# Patient Record
Sex: FEMALE | Race: WHITE | NOT HISPANIC OR LATINO | Employment: FULL TIME | URBAN - METROPOLITAN AREA
[De-identification: names, ages, dates, MRNs, and addresses within clinical notes are randomized per-mention and may not be internally consistent; named-entity substitution may affect disease eponyms.]

---

## 2023-03-23 LAB — THYROTROPIN (MIU/L) IN SER/PLAS BY DETECTION LIMIT <= 0.05 MIU/L: 0.58 MIU/L (ref 0.44–3.98)

## 2023-04-26 ENCOUNTER — HOSPITAL ENCOUNTER (OUTPATIENT)
Dept: DATA CONVERSION | Facility: HOSPITAL | Age: 28
End: 2023-04-26
Attending: SURGERY | Admitting: SURGERY
Payer: COMMERCIAL

## 2023-04-26 DIAGNOSIS — L72.0 EPIDERMAL CYST: ICD-10-CM

## 2023-04-26 DIAGNOSIS — N87.9 DYSPLASIA OF CERVIX UTERI, UNSPECIFIED: ICD-10-CM

## 2023-04-26 DIAGNOSIS — R20.8 OTHER DISTURBANCES OF SKIN SENSATION: ICD-10-CM

## 2023-04-26 DIAGNOSIS — H54.7 UNSPECIFIED VISUAL LOSS: ICD-10-CM

## 2023-04-26 DIAGNOSIS — Z98.51 TUBAL LIGATION STATUS: ICD-10-CM

## 2023-04-26 DIAGNOSIS — E28.2 POLYCYSTIC OVARIAN SYNDROME: ICD-10-CM

## 2023-04-26 DIAGNOSIS — D64.9 ANEMIA, UNSPECIFIED: ICD-10-CM

## 2023-04-26 DIAGNOSIS — E05.90 THYROTOXICOSIS, UNSPECIFIED WITHOUT THYROTOXIC CRISIS OR STORM: ICD-10-CM

## 2023-04-26 DIAGNOSIS — Z90.49 ACQUIRED ABSENCE OF OTHER SPECIFIED PARTS OF DIGESTIVE TRACT: ICD-10-CM

## 2023-04-26 DIAGNOSIS — E66.9 OBESITY, UNSPECIFIED: ICD-10-CM

## 2023-04-26 DIAGNOSIS — D21.4 BENIGN NEOPLASM OF CONNECTIVE AND OTHER SOFT TISSUE OF ABDOMEN: ICD-10-CM

## 2023-04-26 DIAGNOSIS — Z86.16 PERSONAL HISTORY OF COVID-19: ICD-10-CM

## 2023-05-18 LAB
COMPLETE PATHOLOGY REPORT: NORMAL
CONVERTED CLINICAL DIAGNOSIS-HISTORY: NORMAL
CONVERTED FINAL DIAGNOSIS: NORMAL
CONVERTED FINAL REPORT PDF LINK TO COPY AND PASTE: NORMAL
CONVERTED GROSS DESCRIPTION: NORMAL

## 2023-09-02 PROBLEM — Z72.821 INADEQUATE SLEEP HYGIENE: Status: ACTIVE | Noted: 2023-09-02

## 2023-09-02 PROBLEM — R79.89 LOW TSH LEVEL: Status: ACTIVE | Noted: 2023-09-02

## 2023-09-02 PROBLEM — F51.09 OTHER INSOMNIA NOT DUE TO A SUBSTANCE OR KNOWN PHYSIOLOGICAL CONDITION: Status: ACTIVE | Noted: 2023-09-02

## 2023-09-02 PROBLEM — B96.89 BACTERIAL VAGINOSIS: Status: ACTIVE | Noted: 2023-09-02

## 2023-09-02 PROBLEM — T14.8XXA WOUND INFECTION: Status: ACTIVE | Noted: 2023-09-02

## 2023-09-02 PROBLEM — S93.491A SPRAIN OF ANTERIOR TALOFIBULAR LIGAMENT OF RIGHT ANKLE: Status: ACTIVE | Noted: 2023-09-02

## 2023-09-02 PROBLEM — M76.821 POSTERIOR TIBIAL TENDINITIS OF RIGHT LOWER EXTREMITY: Status: ACTIVE | Noted: 2023-09-02

## 2023-09-02 PROBLEM — L02.226 FURUNCLE OF UMBILICUS: Status: ACTIVE | Noted: 2023-09-02

## 2023-09-02 PROBLEM — E66.01 MORBID OBESITY (MULTI): Status: ACTIVE | Noted: 2022-04-18

## 2023-09-02 PROBLEM — E66.813 CLASS 3 SEVERE OBESITY DUE TO EXCESS CALORIES WITH BODY MASS INDEX (BMI) OF 45.0 TO 49.9 IN ADULT: Status: ACTIVE | Noted: 2023-09-02

## 2023-09-02 PROBLEM — G25.81 RESTLESS LEG SYNDROME: Status: ACTIVE | Noted: 2023-09-02

## 2023-09-02 PROBLEM — E05.90 HYPERTHYROIDISM: Status: ACTIVE | Noted: 2023-09-02

## 2023-09-02 PROBLEM — E66.01 CLASS 3 SEVERE OBESITY DUE TO EXCESS CALORIES WITH BODY MASS INDEX (BMI) OF 45.0 TO 49.9 IN ADULT (MULTI): Status: ACTIVE | Noted: 2023-09-02

## 2023-09-02 PROBLEM — J30.2 SEASONAL ALLERGIES: Status: ACTIVE | Noted: 2023-09-02

## 2023-09-02 PROBLEM — L08.9 WOUND INFECTION: Status: ACTIVE | Noted: 2023-09-02

## 2023-09-02 PROBLEM — G47.10 HYPERSOMNIA WITH SLEEP APNEA: Status: ACTIVE | Noted: 2023-09-02

## 2023-09-02 PROBLEM — G47.30 HYPERSOMNIA WITH SLEEP APNEA: Status: ACTIVE | Noted: 2023-09-02

## 2023-09-02 PROBLEM — N76.0 BACTERIAL VAGINOSIS: Status: ACTIVE | Noted: 2023-09-02

## 2023-09-02 PROBLEM — S86.301A INJURY OF PERONEAL TENDON OF RIGHT FOOT: Status: ACTIVE | Noted: 2023-09-02

## 2023-09-02 PROBLEM — R29.898 ANKLE WEAKNESS: Status: ACTIVE | Noted: 2023-09-02

## 2023-09-02 PROBLEM — B08.4 HAND, FOOT AND MOUTH DISEASE: Status: ACTIVE | Noted: 2023-09-02

## 2023-09-02 PROBLEM — Z98.84 BARIATRIC SURGERY STATUS: Status: ACTIVE | Noted: 2023-09-02

## 2023-09-02 PROBLEM — Z14.1 CYSTIC FIBROSIS CARRIER: Status: ACTIVE | Noted: 2023-09-02

## 2023-09-02 PROBLEM — R26.9 GAIT ABNORMALITY: Status: ACTIVE | Noted: 2023-09-02

## 2023-09-02 PROBLEM — R63.5 WEIGHT GAIN: Status: ACTIVE | Noted: 2023-09-02

## 2023-09-02 RX ORDER — FERROUS SULFATE 325(65) MG
65 TABLET ORAL DAILY
COMMUNITY
Start: 2014-08-08 | End: 2023-10-11 | Stop reason: ALTCHOICE

## 2023-09-02 RX ORDER — ONDANSETRON 4 MG/1
4 TABLET, ORALLY DISINTEGRATING ORAL EVERY 6 HOURS PRN
COMMUNITY
Start: 2023-03-24 | End: 2023-10-11 | Stop reason: ALTCHOICE

## 2023-09-02 RX ORDER — CHOLECALCIFEROL (VITAMIN D3) 50 MCG
2000 TABLET ORAL DAILY
COMMUNITY
Start: 2014-08-08

## 2023-09-02 RX ORDER — TRAMADOL HYDROCHLORIDE 50 MG/1
50 TABLET ORAL 4 TIMES DAILY PRN
COMMUNITY
Start: 2022-09-20 | End: 2023-10-11 | Stop reason: ALTCHOICE

## 2023-09-02 RX ORDER — LIDOCAINE HYDROCHLORIDE 20 MG/ML
SOLUTION ORAL; TOPICAL
COMMUNITY
Start: 2022-09-08 | End: 2023-10-11 | Stop reason: ALTCHOICE

## 2023-09-02 RX ORDER — ACETAMINOPHEN 500 MG
1000 TABLET ORAL EVERY 6 HOURS
COMMUNITY
End: 2023-10-11 | Stop reason: ALTCHOICE

## 2023-09-02 RX ORDER — IBUPROFEN 600 MG/1
600 TABLET ORAL EVERY 6 HOURS
COMMUNITY
Start: 2022-09-20 | End: 2023-10-11 | Stop reason: ALTCHOICE

## 2023-09-07 VITALS — WEIGHT: 249.12 LBS | HEIGHT: 62 IN | BODY MASS INDEX: 45.84 KG/M2

## 2023-09-14 NOTE — H&P
History & Physical Reviewed:   Pregnant/Lactating:  ·  Are You Pregnant no   ·  Are You Currently Breastfeeding no     I have reviewed the History and Physical dated:  03-Apr-2023   History and Physical reviewed and relevant findings noted. Patient examined to review pertinent physical  findings.: No significant changes   Home Medications Reviewed: no changes noted   Allergies Reviewed: no changes noted       ERAS (Enhanced Recovery After Surgery):  ·  ERAS Patient: no     Consent:   COVID-19 Consent:  ·  COVID-19 Risk Consent Surgeon has reviewed key risks related to the risk of janette COVID-19 and if they contract COVID-19 what the risks are.       Electronic Signatures:  Sheree Rodriguez)  (Signed 26-Apr-2023 07:59)   Authored: History & Physical Reviewed, ERAS, Consent,  Note Completion      Last Updated: 26-Apr-2023 07:59 by Sheree Rodriguez)

## 2023-10-02 NOTE — OP NOTE
PROCEDURE DETAILS    Preoperative Diagnosis:  Epidermoid cyst, L72.0  Other disturbance of skin sensation, R20.8    Postoperative Diagnosis:  ruptured  cyst of umbilicus  Surgeon: Sheree Rodriguez  Resident/Fellow/Other Assistant: Fifi Goss    Procedure:  1.  EXCISION OF 2CM CYSTIC LESION OF UMBILICUS    Anesthesia: No anesthesiologist associated with this case  Estimated Blood Loss: 2  Findings: above  Specimens(s) Collected: yes,           Operative Report:   Indication for procedure:  Pt had recent gyn surgery and developed a cystic lesion at the umbilicus with drainage and pain.  Here for excision of cyst at umbilicus  2 cm in size.    Procedure:  Pt taken to OR placed supine. MAC given area localized with 15 ml of mix of Marcaine and lidocaine.  Cyst excised with normal skin overlying  2 cm by 1 cm.  Wound irrigated.  Hemostasis achieved with electrocautery.  Skin closed with 4-0 Prolene.  Dressing applied.  Pt awakened and taken to PACU in stable condition.   Note Recipients:   Moises Davila MD - [phone] [PREFERRED]  Sheree Rodriguez MD - [phone] [Preferred]                        Attestation:   Note Completion:  Attending Attestation I performed the procedure without a resident         Electronic Signatures:  Sheree Rodriguez)  (Signed 26-Apr-2023 08:33)   Authored: Post-Operative Note, Chart Review, Note Completion      Last Updated: 26-Apr-2023 08:33 by Sheree Rodriguez)

## 2023-10-11 ENCOUNTER — TELEMEDICINE CLINICAL SUPPORT (OUTPATIENT)
Dept: SURGERY | Facility: CLINIC | Age: 28
End: 2023-10-11
Payer: COMMERCIAL

## 2023-10-11 ENCOUNTER — HOSPITAL ENCOUNTER (OUTPATIENT)
Dept: RADIOLOGY | Facility: HOSPITAL | Age: 28
Discharge: HOME | End: 2023-10-11
Payer: COMMERCIAL

## 2023-10-11 VITALS — BODY MASS INDEX: 48.03 KG/M2 | HEIGHT: 62 IN | WEIGHT: 261 LBS

## 2023-10-11 DIAGNOSIS — S93.491A SPRAIN OF OTHER LIGAMENT OF RIGHT ANKLE, INITIAL ENCOUNTER: ICD-10-CM

## 2023-10-11 PROCEDURE — 73721 MRI JNT OF LWR EXTRE W/O DYE: CPT | Mod: RT

## 2023-10-11 PROCEDURE — 73721 MRI JNT OF LWR EXTRE W/O DYE: CPT | Mod: RIGHT SIDE | Performed by: STUDENT IN AN ORGANIZED HEALTH CARE EDUCATION/TRAINING PROGRAM

## 2023-10-11 NOTE — PROGRESS NOTES
Subjective     Date: 10/11/2023 Time: 12:18 PM  Name: Erick Becerril  MRN: [id number]    This is a 28 y.o. female with morbid obesity (There is no height or weight on file to calculate BMI.) who presents to clinic for consideration of bariatric surgery. she has attempted and failed multiple diet and exercise regimens for weight loss. Initial Onset of obesity was after pregnancy.  Their goal for surgery is to  be healthier  and lose weight. The patient has tried multiple diets to lose weight including Low Carb, Exercise, Low Calorie, and 1:1 conseling . The patient was most successful with the  1:1 consulting . The most pounds lost on this diet were 20 lbs. The patient considers their dietary weakness to be sweets The patient reports a  highest weight ever of 260 (current) pounds and lowest weight ever of 120 pounds Distribution of Obesity: is central. Current diet:  watching what she eats . Compliance: General Adherence Diet Problems: {Diet Problems:92254} } Dietary Details Include:{Dietary Details:86373} The patient does not exercise Injured ankle in May- using walking boot and brace. {Excercise Duration (Optional):36814} {Types of Exercise (Optional):53484}    {Procedure Preferred:60555}    PMH: No problem-specific Assessment & Plan notes found for this encounter.      PSH:   Past Surgical History:   Procedure Laterality Date    OTHER SURGICAL HISTORY  03/18/2021    [place] tooth extraction    OTHER SURGICAL HISTORY  09/27/2022    Laparoscopic hysterectomy    OTHER SURGICAL HISTORY  06/16/2022    Cholecystectomy    OTHER SURGICAL HISTORY  10/27/2021    Bilateral salpingectomy        STOPBANG 2 (+ Tired, BMI>35).     NO personal/family hx of VTE.    FAMILY HISTORY:  Family History   Problem Relation Name Age of Onset    Skin cancer Mother      No Known Problems Father          SOCIAL HISTORY:  Social History     Tobacco Use    Smoking status: Never    Smokeless tobacco: Never   Substance Use Topics    Alcohol use: Not  Currently    Drug use: Not Currently       MEDICATIONS:  Prior to Admission Medications:  Medication Documentation Review Audit    **Prior to Admission medications have not yet been reviewed**          ALLERGIES:  No Known Allergies    REVIEW OF SYSTEMS:  GENERAL: Negative for malaise, significant weight loss and fever  HEAD: Negative for headache, swelling.  NECK: Negative for lumps, goiter, pain and significant neck swelling  RESPIRATORY: Negative for cough, wheezing or shortness of breath.  CARDIOVASCULAR: Negative for chest pain, leg swelling or palpitations.  GI: Negative for abdominal discomfort, blood in stools or black stools or change in bowel habits  : No history of dysuria, frequency or incontinence  MUSCULOSKELETAL: Negative for joint pain or swelling, back pain or muscle pain.  SKIN: Negative for lesions, rash, and itching.  PSYCH: Negative for sleep disturbance, mood disorder and recent psychosocial stressors.  ENDOCRINE: Negative for cold or heat intolerance, polyuria, polydipsia and goiter.    Objective   PHYSICAL EXAM:  Visit Vitals  LMP 07/29/2022   OB Status Hysterectomy   Smoking Status Never     General appearance: obese, NAD  Neuro: AOx3  Head: EOMI; no swelling or lesions of scalp or face  ENT:  no lumps or lymphadenopathy, thyroid normal to palpation; oropharynx clear, no swelling or erythema  Skin: warm, no erythema or rashes  Lungs: clear to percussion and auscultation  Heart: regular rhythm and S1, S2 normal  Abdomen: soft, non-tender, no masses, no organomegaly  Extremities: Normal exam of the extremities. No swelling or pain.  Psych: no hurried speech, no flight of ideas, normal affect    Assessment/Plan   IMPRESSION:  Erick Becerril is a 28 y.o. female with a BMI of There is no height or weight on file to calculate BMI. with the following diagnoses and co-morbidities:     Patient Active Problem List   Diagnosis    Bacterial vaginosis    Cystic fibrosis carrier    Hand, foot and mouth  disease    Hyperthyroidism    Low TSH level    Morbid obesity (CMS/HCC)    Wound infection    Ankle weakness    Bariatric surgery status    BMI 45.0-49.9, adult (CMS/[facility])    Class 3 severe obesity due to excess calories with body mass index (BMI) of 45.0 to 49.9 in adult (CMS/[facility])    Furuncle of umbilicus    Gait abnormality    Hypersomnia with sleep apnea    Inadequate sleep hygiene    Injury of peroneal tendon of right foot    Other insomnia not due to a substance or known physiological condition    Posterior tibial tendinitis of right lower extremity    Restless leg syndrome    Seasonal allergies    Sprain of anterior talofibular ligament of right ankle    Weight gain       This patient does meet the criteria for a surgical weight loss procedure according to NIH guidelines.  The risks of sleeve gastrectomy, Candelario-en-Y gastric bypass, and duodenal switch surgery including but not limited to bleeding, leak along staple lines, infection, dehydration, ulcers, internal hernia, DVT/PE, pneumonia, myocardial infarction, prolonged nausea/vomiting, incomplete resolution of associated medical conditions, reflux, weight regain, vitamin/mineral deficiencies, and death have been explained to the patient and Erick Becerril has expressed understanding and acceptance of them.     We discussed the lifestyle changes necessary to be successful following surgery.    The increased risk of substance and alcohol abuse following bariatric surgery was discussed with the patient, along with the negative consequences of substance/alcohol use after surgery including addiction, worsening of mental health disorders, and injury to the stomach. The risk of smoking and vaping (tobacco or any other substance) after bariatric surgery was explained to the patient. This includes risk of anastamotic ulcers, gastritis, bleeding, perforation, stricture, and PO intolerance.  The patient expressed understanding and acceptance of these risks.    ***The  patient was advised not to become pregnant within 12-18 months following bariatric surgery. She was educated on the increased risks to mother and fetus associated with pregnancy within 2 years of bariatric surgery.    The benefits of the above surgeries including weight loss, improvement/resolution of associated medical and mental health conditions, improved mobility, and decreased mortality have been explained the the patient and Erick Becerril has expressed understanding and acceptance of them.      PLAN:  The plan of treatment for Erick Becerril is to continue with the consultations and tests ordered today in hopes of qualifying for pre-operative clearance for bariatric surgery. This includes:    Consult Nutrition for education and *** months of MSWL  Consult Psychology  Consult Cardiology  Consult Pulmonology  Labs ordered  EGD  PCP for medical optimization  Consult sleep medicine - concern for LUCY  Recommend at least *** lbs of weight loss prior to surgery.      *** minutes were spent with patient including history, physical exam, and education.

## 2023-10-11 NOTE — PROGRESS NOTES
"Initial Bariatric Nutrition Assessment    Surgeon:  Viky  Patient is considering: sleeve gastrectomy    ASSESSMENT:  Current weight:   Vitals:    10/11/23 1439   Weight: 118 kg (261 lb)     Ht:  1.575 m (5' 2\")   BMI:  Body mass index is 47.74 kg/m².        Initial start weight:   261.0 lbs.  Pre-Op Excess Body Weight (EBW):  125.0  Target Post-Op weight goal:  179.7- 198.5      This is a 28 y.o. female with morbid obesity (Body mass index is 47.74 kg/m².) who presents to clinic for consideration of bariatric surgery. The patient has attempted and failed multiple diet and exercise regimens for weight loss.   Initial Onset of obesity was about 10 years ago after having her first child.  Goal for surgery is to lower weight for overall health.      Food allergies/intolerances:   no  Chewing/Swallowing/Dentition: no  Nausea / Vomiting / Hx Gastroparesis:  no  Diarrhea/ Constipation: no  Smoking/Tobacco use: no  Vitamins/Minerals supplements: MVI  Hours of sleep/night: 6-8  Exercise: light walking; has ankle brace due to a fall in May  Previous Weight loss Attempts: low carb, Krys's, dietitian monitored    Medications:     Current Outpatient Medications:     cholecalciferol (Vitamin D3) 50 MCG (2000 UT) tablet, Take by mouth., Disp: , Rfl:     24 HOUR RECALL/DIET HISTORY:  Breakfast:  bagel with butter  Snack:  no  Lunch: air fried chicken and steamed vegetables  Snack:  no  Dinner: grilled chicken salad with FF dressing  Snack: scoop sherbert  Beverages: water, coffee with stevia and skim milk, regular pop  Alcohol: no    Person responsible for cooking & shopping?  fiancee and self  How often do you eat sweet snacks?   3x/wk  How often do you eat savory snacks?  once weekly  How often do you eat out?   once weekly  Do you feel overly stuffed?   sometimes  Binge Eating?  no  Night Eating? no   Emotional Eating?  Boredom eating     READINESS TO LEARN:  Motivation to learn: Interested        Understanding of instruction: " Good  Anticipated Compliance: Good  Family Support:   greg    Educational Materials Provided:    Pre-op Diet and sample menus                                             Nutrition Guidelines for Gastric Bypass and Sleeve Gastrectomy   Schedules for MSWL class and support group   1200 Calorie meal plan   Goals sheet    Nutrition assessment completed today.  Pt will be scheduled for video education class to discuss the 2 week pre op diet, post op protein and fluid goals, vitamin and mineral supplementation, exercise goals, and post op diet progression closer to the time of surgery.    Instructed pt on a 1200 calorie meal plan and to measure and record intake daily. Advised to eat 3 meals per and 1-2 high protein snacks.  Recommend eating 15-30 grams protein per meal. Reviewed the postop behaviors to start practicing.  Set a goal to start doing regular exercise.    Patient was receptive to nutritional recommendations, asked numerous questions, and verbalized understanding of the weight loss surgery diet.  Patient expressed understanding about the importance of strict dietary compliance post-surgery to avoid nutritional deficiencies and achieve optimal weight loss and verbalized intent to follow dietary recommendations.    Malnutrition Screening: n/a  Significant unintentional weight loss? n/a   Eating less than 75% of usual intake for more than 2 weeks? n/a      Nutrition Diagnosis:   Overweight/obesity related to excess energy intake as evidenced by BMI >= 40 kg/m^2.  Food- and nutrition-related knowledge deficit related to lack of prior exposure to surgical weight loss information as evidenced by pt new to surgical program.    Nutrition Interventions:   Modify type and amount of food and nutrients within meals and snacks.  Comprehensive Nutrition Education    Recommendations:  1. Begin following your meal plan.  Measure and record intake daily.   2. Structure meal patterns, eating three meals and 1-2 snacks per  day.  3. Aim for 15-30 oz protein per meal.  Have 1-2 high protein snacks that are 10-20 g protein each.  You can try a tuna or chicken packet, Greek yogurt, 2 string cheeses, Protein bars like Quest, Pure Protein, Premier, or Built Bars. you can also try protein chips form Quest or Atkins.    4. Drink 64oz of calorie-free, caffeine-free, and non-carbonated beverages.   5. Practice no drinking 30 minutes before meals, nothing with meals and wait 30 minutes after meals to drink again. Make meals last 30 minutes-chew thoroughly.   6. Limit or omit eating out/sweets/savory snacks to 1-2 times per week.  7. Begin daily multivitamin.  [employer] Complete has everything you need.  8. Increase physical activity by 10-15 minutes as tolerated to an end goal of 60 minutes 5 x per week. Consistency is the key.  Pre-op Goal weight: lose 5% of body weight    Nutrition Monitoring and Evaluation: 1-2 pound weight loss per week  Criteria: weight check  Need for Follow-up: in 1 month    Patient does meet [employer] guidelines for weight loss surgery, however needs to demonstrate consistent effort in making dietary changes before giving clearance. It is anticipated that the patient will need at least 1 nutritional follow-up visits prior to clearance for surgery.

## 2023-10-18 ENCOUNTER — TELEMEDICINE (OUTPATIENT)
Dept: SURGERY | Facility: CLINIC | Age: 28
End: 2023-10-18
Payer: COMMERCIAL

## 2023-10-18 DIAGNOSIS — F51.09 OTHER INSOMNIA NOT DUE TO A SUBSTANCE OR KNOWN PHYSIOLOGICAL CONDITION: ICD-10-CM

## 2023-10-18 DIAGNOSIS — E66.01 MORBID OBESITY (MULTI): ICD-10-CM

## 2023-10-18 PROCEDURE — 99204 OFFICE O/P NEW MOD 45 MIN: CPT | Performed by: SURGERY

## 2023-10-18 NOTE — H&P
"BARIATRIC SURGERY NEW PATIENT CONSULTATION  HISTORY AND PHYSICAL      Date: 10/18/2023 Time: 12:40 PM  Name: Erick Becerril  MRN: [id number]    This is a 28 y.o. female with morbid obesity with a BMI of 47.7 (ht 5'2\", wt 261 lbs) who presents to clinic for consideration of bariatric surgery. she has attempted and failed multiple diet and exercise regimens for weight loss.     PMH:   BMI 45.0-49.9, adult (CMS/[facility])  BMI 47.7    Other insomnia not due to a substance or known physiological condition  No LUCY on PSG done 9/2023  Denies GERD, diabetes, HTN, heart disease.    PSH: wisdom teeth, BTL, lap romi, lap hysterectomy.    Denies smoking/vaping/regular EtOH/drug use.     No personal/family hx of VTE.    The risks of sleeve gastrectomy, Candelario-en-Y gastric bypass, and duodenal switch surgery including bleeding, leak, wound infection, dehydration, ulcers, internal hernia, DVT/PE, prolonged nausea/vomiting, incomplete resolution of associated medical conditions, reflux, weight regain, vitamin/mineral deficiencies, and death have been explained to the patient and Erick Becerril has expressed understanding and acceptance of them.     The increased risk of substance and alcohol abuse following bariatric surgery was discussed with the patient, along with the negative consequences of substance/alcohol use after surgery including addiction, worsening of mental health disorders, and injury to the stomach. The risk of smoking and vaping (tobacco or any other substance) after bariatric surgery was explained to the patient. This includes risk of anastamotic ulcers, gastritis, bleeding, perforation, stricture, and PO intolerance.  The patient expressed understanding and acceptance of these risks.    The patient was advised not to become pregnant within 12-18 months following bariatric surgery. She was educated on the increased risks to mother and fetus associated with pregnancy within 2 years of bariatric surgery. She has had a " hysterectomy.     The benefits of the above surgeries including weight loss, improvement/resolution of associated medical and mental health conditions, improved mobility, and decreased mortality have been explained the the patient and Erick Becerril has expressed understanding and acceptance of them.    PAST MEDICAL HISTORY:  Past Medical History:   Diagnosis Date    Anemia complicating pregnancy, third trimester 06/24/2021    Anemia, antepartum, third trimester    Decreased fetal movements, unspecified trimester, not applicable or unspecified 07/01/2021    Decreased fetal movement during pregnancy, antepartum, single or unspecified fetus    Encounter for initial prescription of intrauterine contraceptive device     Encounter for initial prescription of intrauterine contraceptive device (IUD)    Encounter for insertion of intrauterine contraceptive device 11/09/2021    Encounter for insertion of mirena IUD    Encounter for routine postpartum follow-up 08/23/2021    Postpartum exam    Encounter for supervision of other normal pregnancy, second trimester 03/18/2021    Multigravida in second trimester    Encounter for supervision of other normal pregnancy, third trimester 07/08/2021    Multigravida in third trimester    Encounter for surveillance of contraceptives, unspecified 12/08/2021    Encounter for surveillance of contraceptive device    Excessive and frequent menstruation with irregular cycle 08/10/2022    Menorrhagia with irregular cycle    Liver and biliary tract disorders in pregnancy, unspecified trimester 06/24/2021    Cholestasis of pregnancy    Obesity complicating pregnancy, unspecified trimester 03/18/2021    Obesity affecting pregnancy, antepartum    Other specified noninflammatory disorders of vagina 10/07/2022    Problematic vaginal discharge    Other specified pregnancy related conditions, unspecified trimester 03/18/2021    Rh negative, antepartum    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    History of neoplasm in situ of cervix    Personal history of other diseases of the female genital tract 03/18/2021    History of PCOS    Personal history of other diseases of the female genital tract 08/10/2022    History of dysfunctional uterine bleeding        PAST SURGICAL HISTORY:  Past Surgical History:   Procedure Laterality Date    OTHER SURGICAL HISTORY  03/18/2021    [place] tooth extraction    OTHER SURGICAL HISTORY  09/27/2022    Laparoscopic hysterectomy    OTHER SURGICAL HISTORY  06/16/2022    Cholecystectomy    OTHER SURGICAL HISTORY  10/27/2021    Bilateral salpingectomy       FAMILY HISTORY:  Family History   Problem Relation Name Age of Onset    Skin cancer Mother      No Known Problems Father          SOCIAL HISTORY:  Social History     Tobacco Use    Smoking status: Never    Smokeless tobacco: Never   Substance Use Topics    Alcohol use: Not Currently    Drug use: Not Currently       MEDICATIONS:  Prior to Admission Medications:    Current Outpatient Medications:     cholecalciferol (Vitamin D3) 50 MCG (2000 UT) tablet, Take by mouth., Disp: , Rfl:       ALLERGIES:  No Known Allergies    REVIEW OF SYSTEMS:  GENERAL: Negative for malaise, significant weight loss and fever  HEAD: Negative for headache, swelling.  NECK: Negative for lumps, goiter, pain and significant neck swelling  RESPIRATORY: Negative for cough, wheezing or shortness of breath.  CARDIOVASCULAR: Negative for chest pain, leg swelling or palpitations.  GI: Negative for abdominal discomfort, blood in stools or black stools or change in bowel habits  : No history of dysuria, frequency or incontinence  MUSCULOSKELETAL: Negative for joint pain or swelling, back pain or muscle pain.  SKIN: Negative for lesions, rash, and itching.  PSYCH: Negative for sleep disturbance, mood disorder and recent psychosocial stressors.  ENDOCRINE: Negative for cold or heat intolerance, polyuria, polydipsia and goiter.    PHYSICAL  EXAM:  No vital signs and a limited physical exam was conducted during this virtual visit.       IMPRESSION:  Erick Becerril is a 28 y.o. female with the following diagnosis and co-morbidities:  BMI 47.7    BMI 45.0-49.9, adult (CMS/[facility])  BMI 47.7    Other insomnia not due to a substance or known physiological condition  No LUCY on PSG done 9/2023      Diagnosis noted as above, no additional diagnosis at this time.  This patient does meet the criteria for a surgical weight loss procedure according to NIH guidelines.    PLAN:  The plan of treatment for Erick Becerril is to continue with the consultations and tests ordered today in hopes of qualifying for pre-operative clearance for bariatric surgery. This includes:    Consult Nutrition for education and 0 mo SWL  Consult Psychology  Consult cardiology  Labs ordered  EGD  PCP for medical optimization  Consult sleep medicine - no LUCY  Counseled on preop weight loss goal of at least 10 lbs    Patient is interested in: sleeve gastrectomy    45 minutes were spent with patient including history, physical exam, and education.    Nacho Salmon MD  Bariatric and Minimally Invasive General Surgery

## 2023-10-18 NOTE — PROGRESS NOTES
"BARIATRIC SURGERY NEW PATIENT CONSULTATION  HISTORY AND PHYSICAL        Date: 10/18/2023 Time: 12:40 PM  Name: Erick Becerril  MRN: [id number]     This is a 28 y.o. female with morbid obesity with a BMI of 47.7 (ht 5'2\", wt 261 lbs) who presents to clinic for consideration of bariatric surgery. she has attempted and failed multiple diet and exercise regimens for weight loss.      PMH:   BMI 45.0-49.9, adult (CMS/[facility])  BMI 47.7     Other insomnia not due to a substance or known physiological condition  No LUCY on PSG done 9/2023  Denies GERD, diabetes, HTN, heart disease.     PSH: wisdom teeth, BTL, lap romi, lap hysterectomy.     Denies smoking/vaping/regular EtOH/drug use.      No personal/family hx of VTE.     The risks of sleeve gastrectomy, Canedlario-en-Y gastric bypass, and duodenal switch surgery including bleeding, leak, wound infection, dehydration, ulcers, internal hernia, DVT/PE, prolonged nausea/vomiting, incomplete resolution of associated medical conditions, reflux, weight regain, vitamin/mineral deficiencies, and death have been explained to the patient and Erick Becerril has expressed understanding and acceptance of them.      The increased risk of substance and alcohol abuse following bariatric surgery was discussed with the patient, along with the negative consequences of substance/alcohol use after surgery including addiction, worsening of mental health disorders, and injury to the stomach. The risk of smoking and vaping (tobacco or any other substance) after bariatric surgery was explained to the patient. This includes risk of anastamotic ulcers, gastritis, bleeding, perforation, stricture, and PO intolerance.  The patient expressed understanding and acceptance of these risks.     The patient was advised not to become pregnant within 12-18 months following bariatric surgery. She was educated on the increased risks to mother and fetus associated with pregnancy within 2 years of bariatric surgery. She has had " a hysterectomy.      The benefits of the above surgeries including weight loss, improvement/resolution of associated medical and mental health conditions, improved mobility, and decreased mortality have been explained the the patient and Erick Becerril has expressed understanding and acceptance of them.     PAST MEDICAL HISTORY:  Medical History        Past Medical History:   Diagnosis Date    Anemia complicating pregnancy, third trimester 06/24/2021     Anemia, antepartum, third trimester    Decreased fetal movements, unspecified trimester, not applicable or unspecified 07/01/2021     Decreased fetal movement during pregnancy, antepartum, single or unspecified fetus    Encounter for initial prescription of intrauterine contraceptive device       Encounter for initial prescription of intrauterine contraceptive device (IUD)    Encounter for insertion of intrauterine contraceptive device 11/09/2021     Encounter for insertion of mirena IUD    Encounter for routine postpartum follow-up 08/23/2021     Postpartum exam    Encounter for supervision of other normal pregnancy, second trimester 03/18/2021     Multigravida in second trimester    Encounter for supervision of other normal pregnancy, third trimester 07/08/2021     Multigravida in third trimester    Encounter for surveillance of contraceptives, unspecified 12/08/2021     Encounter for surveillance of contraceptive device    Excessive and frequent menstruation with irregular cycle 08/10/2022     Menorrhagia with irregular cycle    Liver and biliary tract disorders in pregnancy, unspecified trimester 06/24/2021     Cholestasis of pregnancy    Obesity complicating pregnancy, unspecified trimester 03/18/2021     Obesity affecting pregnancy, antepartum    Other specified noninflammatory disorders of vagina 10/07/2022     Problematic vaginal discharge    Other specified pregnancy related conditions, unspecified trimester 03/18/2021     Rh negative, antepartum    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     History of neoplasm in situ of cervix    Personal history of other diseases of the female genital tract 03/18/2021     History of PCOS    Personal history of other diseases of the female genital tract 08/10/2022     History of dysfunctional uterine bleeding            PAST SURGICAL HISTORY:  Surgical History         Past Surgical History:   Procedure Laterality Date    OTHER SURGICAL HISTORY   03/18/2021     [place] tooth extraction    OTHER SURGICAL HISTORY   09/27/2022     Laparoscopic hysterectomy    OTHER SURGICAL HISTORY   06/16/2022     Cholecystectomy    OTHER SURGICAL HISTORY   10/27/2021     Bilateral salpingectomy            FAMILY HISTORY:  Family History          Family History   Problem Relation Name Age of Onset    Skin cancer Mother        No Known Problems Father                SOCIAL HISTORY:  Social History           Tobacco Use    Smoking status: Never    Smokeless tobacco: Never   Substance Use Topics    Alcohol use: Not Currently    Drug use: Not Currently         MEDICATIONS:  Prior to Admission Medications:     Current Outpatient Medications:     cholecalciferol (Vitamin D3) 50 MCG (2000 UT) tablet, Take by mouth., Disp: , Rfl:         ALLERGIES:  No Known Allergies     REVIEW OF SYSTEMS:  GENERAL: Negative for malaise, significant weight loss and fever  HEAD: Negative for headache, swelling.  NECK: Negative for lumps, goiter, pain and significant neck swelling  RESPIRATORY: Negative for cough, wheezing or shortness of breath.  CARDIOVASCULAR: Negative for chest pain, leg swelling or palpitations.  GI: Negative for abdominal discomfort, blood in stools or black stools or change in bowel habits  : No history of dysuria, frequency or incontinence  MUSCULOSKELETAL: Negative for joint pain or swelling, back pain or muscle pain.  SKIN: Negative for lesions, rash, and itching.  PSYCH: Negative for sleep disturbance, mood disorder and recent  psychosocial stressors.  ENDOCRINE: Negative for cold or heat intolerance, polyuria, polydipsia and goiter.     PHYSICAL EXAM:  No vital signs and a limited physical exam was conducted during this virtual visit.         IMPRESSION:  Erick Becerril is a 28 y.o. female with the following diagnosis and co-morbidities:  BMI 47.7     BMI 45.0-49.9, adult (CMS/[facility])  BMI 47.7     Other insomnia not due to a substance or known physiological condition  No LUCY on PSG done 9/2023        Diagnosis noted as above, no additional diagnosis at this time.  This patient does meet the criteria for a surgical weight loss procedure according to NIH guidelines.     PLAN:  The plan of treatment for Erick Becerril is to continue with the consultations and tests ordered today in hopes of qualifying for pre-operative clearance for bariatric surgery. This includes:     Consult Nutrition for education and 0 mo SWL  Consult Psychology  Consult cardiology  Labs ordered  EGD  PCP for medical optimization  Consult sleep medicine - no LUCY  Counseled on preop weight loss goal of at least 10 lbs     Patient is interested in: sleeve gastrectomy     45 minutes were spent with patient including history, physical exam, and education.     Nacho Salmon MD  Bariatric and Minimally Invasive General Surgery

## 2023-10-26 ENCOUNTER — OFFICE VISIT (OUTPATIENT)
Dept: ORTHOPEDIC SURGERY | Facility: CLINIC | Age: 28
End: 2023-10-26
Payer: COMMERCIAL

## 2023-10-26 ENCOUNTER — ANCILLARY PROCEDURE (OUTPATIENT)
Dept: RADIOLOGY | Facility: CLINIC | Age: 28
End: 2023-10-26
Payer: COMMERCIAL

## 2023-10-26 DIAGNOSIS — M25.871 ANKLE IMPINGEMENT SYNDROME, RIGHT: ICD-10-CM

## 2023-10-26 DIAGNOSIS — M25.371 OTHER INSTABILITY, RIGHT ANKLE: ICD-10-CM

## 2023-10-26 DIAGNOSIS — M21.41 ACQUIRED PES PLANUS, RIGHT: ICD-10-CM

## 2023-10-26 DIAGNOSIS — S86.301D INJURY OF PERONEAL TENDON OF RIGHT FOOT, SUBSEQUENT ENCOUNTER: ICD-10-CM

## 2023-10-26 DIAGNOSIS — S93.491D SPRAIN OF ANTERIOR TALOFIBULAR LIGAMENT OF RIGHT ANKLE, SUBSEQUENT ENCOUNTER: Primary | ICD-10-CM

## 2023-10-26 PROCEDURE — 1036F TOBACCO NON-USER: CPT | Performed by: STUDENT IN AN ORGANIZED HEALTH CARE EDUCATION/TRAINING PROGRAM

## 2023-10-26 PROCEDURE — 3008F BODY MASS INDEX DOCD: CPT | Performed by: STUDENT IN AN ORGANIZED HEALTH CARE EDUCATION/TRAINING PROGRAM

## 2023-10-26 PROCEDURE — 73630 X-RAY EXAM OF FOOT: CPT | Mod: RT,FY

## 2023-10-26 PROCEDURE — 73630 X-RAY EXAM OF FOOT: CPT | Mod: RIGHT SIDE | Performed by: RADIOLOGY

## 2023-10-26 PROCEDURE — 99214 OFFICE O/P EST MOD 30 MIN: CPT | Performed by: STUDENT IN AN ORGANIZED HEALTH CARE EDUCATION/TRAINING PROGRAM

## 2023-10-26 RX ORDER — SODIUM CHLORIDE, SODIUM LACTATE, POTASSIUM CHLORIDE, CALCIUM CHLORIDE 600; 310; 30; 20 MG/100ML; MG/100ML; MG/100ML; MG/100ML
100 INJECTION, SOLUTION INTRAVENOUS CONTINUOUS
Status: CANCELLED | OUTPATIENT
Start: 2023-10-26

## 2023-10-26 ASSESSMENT — PAIN - FUNCTIONAL ASSESSMENT: PAIN_FUNCTIONAL_ASSESSMENT: NO/DENIES PAIN

## 2023-10-26 NOTE — PROGRESS NOTES
ORTHOPAEDIC SURGERY OUTPATIENT PROGRESS NOTE    Chief Complaint:  Right ankle pain    History Of Present Illness  28-year-old female presents for evaluation of right ankle pain in follow-up from urgent care. Patient reports an inversion type injury to her ankle on 05/29/2023. Patient was attempting to hike and a misstep. She reports no prior history of trauma that she can recall. She experienced severe pain and had difficulty bearing weight. She was in the woods at the time hiking and had a friend assist her to her vehicle as she was unable to bear weight. She is currently reporting 7 out of 10 pain. She been taking ibuprofen. She was seen in urgent care setting where x-rays were obtained and the patient was placed into a walking boot and made nonweightbearing with crutches. She denies any associated numbness, tingling or weakness. Patient works as a  and was sent home from work today until further evaluation.     Patient is a current non-smoker, nondiabetic and does not use any anticoagulation.     Please see detailed patient history sheet for further information.     06/20/2023: Patient returns in follow-up of her right ankle injury. Patient reports that she has been essentially nonweightbearing from her prior appointment. She has not been comfortable putting weight down has been using crutches for toileting. She denies any new trauma or associated numbness, tingling weakness. She has noted that her swelling has decreased significantly. She has not yet returned to work.     07/03/2023: Patient returns for follow-up of her right ankle injury. Patient has transitioned to weightbearing in regular shoes and is currently wearing sandals with ankle brace. She reports returning to work with some residual, 3 out of 10 pain. She has not formally started physical therapy but has been doing HEP at home. Pain is worse when she has a misstep with some associated swelling. She has been using compression socks. She is  leaving Friday for vacation.     08/11/2023: Patient returns for follow-up of her right ankle injury. She has been weightbearing as tolerated in regular shoes and continuing to use her ankle brace. She is reporting continued pain in the ankle that worsens with activity and is rated a 7 out of 10 particular with turning her ankle in. She has tried physical therapy that appears to have helped some but still has pain. She has returned to work and is not reporting any finn instability at this time.     09/27/2023: Patient returns for follow-up of her right ankle injury. She is continued in physical therapy with the use of a brace. Unfortunately, she continues to complain of intermittent moderate to severe, typically 7 out of 10 pain when going downstairs. Pain is predominantly on the inside of her ankle. She does report subjective symptoms with ankle giving out or giving way.    10/26/2023: Patient returns for follow-up of her right ankle injury.  She has continued with severe ankle pain, typically 7-8 out of 10. Pain is also noted when going downstairs.  She has a clicking and catching.  She does note the ankle gives out or gives way.  She typically feels better in the brace.     Past Medical History  Past Medical History:   Diagnosis Date    Anemia complicating pregnancy, third trimester 06/24/2021    Anemia, antepartum, third trimester    Decreased fetal movements, unspecified trimester, not applicable or unspecified 07/01/2021    Decreased fetal movement during pregnancy, antepartum, single or unspecified fetus    Encounter for initial prescription of intrauterine contraceptive device     Encounter for initial prescription of intrauterine contraceptive device (IUD)    Encounter for insertion of intrauterine contraceptive device 11/09/2021    Encounter for insertion of mirena IUD    Encounter for routine postpartum follow-up 08/23/2021    Postpartum exam    Encounter for supervision of other normal pregnancy, second  trimester 03/18/2021    Multigravida in second trimester    Encounter for supervision of other normal pregnancy, third trimester 07/08/2021    Multigravida in third trimester    Encounter for surveillance of contraceptives, unspecified 12/08/2021    Encounter for surveillance of contraceptive device    Excessive and frequent menstruation with irregular cycle 08/10/2022    Menorrhagia with irregular cycle    Liver and biliary tract disorders in pregnancy, unspecified trimester 06/24/2021    Cholestasis of pregnancy    Obesity complicating pregnancy, unspecified trimester 03/18/2021    Obesity affecting pregnancy, antepartum    Other specified noninflammatory disorders of vagina 10/07/2022    Problematic vaginal discharge    Other specified pregnancy related conditions, unspecified trimester 03/18/2021    Rh negative, antepartum    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/23/2021    History of neoplasm in situ of cervix    Personal history of other diseases of the female genital tract 03/18/2021    History of PCOS    Personal history of other diseases of the female genital tract 08/10/2022    History of dysfunctional uterine bleeding       Surgical History  Recent Surgeries in Orthopaedic Surgery            No cases to display             Social History  Social History     Socioeconomic History    Marital status:      Spouse name: None    Number of children: None    Years of education: None    Highest education level: None   Occupational History    None   Tobacco Use    Smoking status: Never    Smokeless tobacco: Never   Substance and Sexual Activity    Alcohol use: Not Currently    Drug use: Not Currently    Sexual activity: None   Other Topics Concern    None   Social History Narrative    None     Social Determinants of Health     Financial Resource Strain: Not on file   Food Insecurity: Not on file   Transportation Needs: Not on file   Physical Activity: Not on file   Stress: Not on file    Social Connections: Not on file   Intimate Partner Violence: Not on file   Housing Stability: Not on file       Family History  Family History   Problem Relation Name Age of Onset    Skin cancer Mother      No Known Problems Father          Allergies  No Known Allergies    Review of Systems  REVIEW OF SYSTEMS  Constitutional: no unplanned weight loss  Psychiatric: no suicidal ideation  ENT: no vision changes, no sinus problems  Pulmonary: no shortness of breath during office visit today  Lymphatic: no enlarged lymph nodes  Cardiovascular: no chest pain or shortness of breath during office visit today  Gastrointestinal: no stomach problems  Genitourinary: no dysuria   Skin: no rashes  Endocrine: no thyroid problems  Neurological: no headache, no numbness  Hematological: no easy bruising  Musculoskeletal: Right ankle pain     Physical Exam  PHYSICAL EXAMINATION  Constitutional Exam: well developed and well nourished  Psychiatric Exam: alert and oriented, appropriate mood and behavior  Eye Exam: EOMI  Pulmonary Exam: breathing non-labored, no apparent distress  Lymphatic exam: no appreciable lymphadenopathy in the lower extremities  Cardiovascular exam: RRR to peripheral palpation, DP pulses 2+, PT 2+, toes are pink with good capillary refill, no pitting edema  Skin exam: no open lesions, rashes, abrasions or ulcerations  Neurological exam: sensation to light touch intact in both lower extremities in peripheral and dermatomal distributions (except for any abnormalities noted in musculoskeletal exam)    Musculoskeletal exam: Right lower extremity examination.  Patient pain continues to localize overlying the medial gutter and superficial deltoid.  She is nontender to palpation along the PTT but is tender to palpation about the mid navicular pole.  Patient has no obvious ankle effusion.  Patient has greater than physiologic hindfoot valgus with pes planus arch posture and no significant forefoot deformity noted.   Patient has supple but painful ankle ROM with crepitus, I am able to passively range her to approximately neutral dorsiflexion.  Patient has supple and pain-free subtalar and midtarsal joint range of motion.  Patient has 5 out of 5 strength in plantarflexion, dorsiflexion and EHL.  She has 5 out of 5 strength in inversion with some pain and 5 out of 5 strength in eversion.  Patient has 2+ DP/PT pulse palpated.  She has a positive anterior drawer, negative talar tilt, negative syndesmotic squeeze test.  Positive dorsiflexion and external rotation stress. +AM impingement test.    Last Recorded Vitals  Last menstrual period 07/29/2022.    Laboratory Results  No results found for this or any previous visit (from the past 24 hour(s)).     Radiology Results  MRI right ankle without contrast reviewed from 10/11/2023 and independently evaluated by me demonstrates attenuated ATFL, deltoid and concern for anterior medial impingement.  Reactive edema noted at navicular pole. There is no obvious talar OCD, PTT tear or spring ligament injury.    X-ray imaging 3 view weightbearing right foot reviewed from 10/26/2023 and independently evaluated by me demonstrates no acute fracture or dislocation. Pes planus posture.    Assessment/Plan:  28-year-old female BMI 47 who in my impression has continued right ankle pain following sprain with MRI proven involvement of ATFL, deltoid and concern for anterior ankle impingement in the setting of progressively collapsing foot deformity. I have reviewed the diagnosis and treatment options extensively with the patient.  The patient has failed an appropriate and exhaustive course of nonoperative treatment including PT, bracing and right ankle CSI from May 2023.  I will recommend that she undergo right ankle arthroscopy to more completely evaluate her joint space, rule out soft tissue and bony impingement, evaluate her stability and likely reconstruct her lateral and possibly medial ligaments.  I  discussed with the patient that due to her flatfoot that we could consider bony and soft tissue realignment procedures to reconstruct her foot, however in the absence of PTTD and spring ligament injury I would favor a trial of OTC or custom orthotics prior to consideration for flatfoot reconstruction. I will plan to see the patient back approximately 2 weeks following surgery for a wound check.  Upon return, patient would not require further imaging.    Samuel Corcoran MD, ANNIA  Department of Orthopaedic Surgery  [facility]    The diagnosis and treatment plan were reviewed with the patient. All questions were answered. The patient verbalized understanding of the treatment plan. There were no barriers to understanding identified.    Note dictated with [employer] software.  Completed without full type editing and sent to avoid delay.

## 2023-11-02 ENCOUNTER — TELEPHONE (OUTPATIENT)
Dept: ORTHOPEDIC SURGERY | Facility: CLINIC | Age: 28
End: 2023-11-02
Payer: COMMERCIAL

## 2023-11-02 NOTE — TELEPHONE ENCOUNTER
I contacted the patient via the number listed in the chart, [phone] as she had requested a call back with reference to her upcoming surgery.  We discussed the expected postoperative recovery course.  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  I counseled the patient that she will have increased swelling and likely increased pain with decreased elevation upon returning to work but she will be able to be compliant weightbearing restrictions utilizing crutches or knee scooter.  We will plan to go forward with the scheduled procedure.    Samuel Corcoran MD, ANNIA  Department of Orthopaedic Surgery  [facility]

## 2023-11-06 ENCOUNTER — TELEMEDICINE CLINICAL SUPPORT (OUTPATIENT)
Dept: PREADMISSION TESTING | Facility: HOSPITAL | Age: 28
End: 2023-11-06
Payer: COMMERCIAL

## 2023-11-06 RX ORDER — IBUPROFEN 200 MG
400 TABLET ORAL EVERY 8 HOURS PRN
COMMUNITY
End: 2023-11-13 | Stop reason: HOSPADM

## 2023-11-09 ENCOUNTER — LAB (OUTPATIENT)
Dept: LAB | Facility: LAB | Age: 28
End: 2023-11-09
Payer: COMMERCIAL

## 2023-11-09 ENCOUNTER — APPOINTMENT (OUTPATIENT)
Dept: CARDIOLOGY | Facility: HOSPITAL | Age: 28
End: 2023-11-09
Payer: COMMERCIAL

## 2023-11-09 ENCOUNTER — PRE-ADMISSION TESTING (OUTPATIENT)
Dept: PREADMISSION TESTING | Facility: HOSPITAL | Age: 28
End: 2023-11-09
Payer: COMMERCIAL

## 2023-11-09 VITALS
BODY MASS INDEX: 47.46 KG/M2 | HEIGHT: 63 IN | TEMPERATURE: 97.7 F | RESPIRATION RATE: 18 BRPM | OXYGEN SATURATION: 99 % | DIASTOLIC BLOOD PRESSURE: 65 MMHG | WEIGHT: 267.86 LBS | HEART RATE: 82 BPM | SYSTOLIC BLOOD PRESSURE: 113 MMHG

## 2023-11-09 DIAGNOSIS — Z01.818 PREPROCEDURAL EXAMINATION: Primary | ICD-10-CM

## 2023-11-09 DIAGNOSIS — E66.01 MORBID OBESITY (MULTI): ICD-10-CM

## 2023-11-09 DIAGNOSIS — F51.09 OTHER INSOMNIA NOT DUE TO A SUBSTANCE OR KNOWN PHYSIOLOGICAL CONDITION: ICD-10-CM

## 2023-11-09 DIAGNOSIS — Z01.818 PREPROCEDURAL EXAMINATION: ICD-10-CM

## 2023-11-09 LAB
25(OH)D3 SERPL-MCNC: 27 NG/ML (ref 30–100)
ALBUMIN SERPL BCP-MCNC: 4.1 G/DL (ref 3.4–5)
ALP SERPL-CCNC: 53 U/L (ref 33–110)
ALT SERPL W P-5'-P-CCNC: 9 U/L (ref 7–45)
AMPHETAMINES UR QL SCN: NORMAL
ANION GAP SERPL CALC-SCNC: 12 MMOL/L (ref 10–20)
APTT PPP: 35 SECONDS (ref 27–38)
AST SERPL W P-5'-P-CCNC: 11 U/L (ref 9–39)
BARBITURATES UR QL SCN: NORMAL
BASOPHILS # BLD AUTO: 0.05 X10*3/UL (ref 0–0.1)
BASOPHILS NFR BLD AUTO: 0.5 %
BENZODIAZ UR QL SCN: NORMAL
BILIRUB SERPL-MCNC: 0.5 MG/DL (ref 0–1.2)
BUN SERPL-MCNC: 13 MG/DL (ref 6–23)
BZE UR QL SCN: NORMAL
CALCIUM SERPL-MCNC: 8.9 MG/DL (ref 8.6–10.3)
CANNABINOIDS UR QL SCN: NORMAL
CHLORIDE SERPL-SCNC: 105 MMOL/L (ref 98–107)
CHOLEST SERPL-MCNC: 158 MG/DL (ref 0–199)
CHOLESTEROL/HDL RATIO: 4.5
CO2 SERPL-SCNC: 24 MMOL/L (ref 21–32)
CREAT SERPL-MCNC: 0.69 MG/DL (ref 0.5–1.05)
EOSINOPHIL # BLD AUTO: 0.28 X10*3/UL (ref 0–0.7)
EOSINOPHIL NFR BLD AUTO: 2.6 %
ERYTHROCYTE [DISTWIDTH] IN BLOOD BY AUTOMATED COUNT: 13.2 % (ref 11.5–14.5)
EST. AVERAGE GLUCOSE BLD GHB EST-MCNC: 105 MG/DL
FENTANYL+NORFENTANYL UR QL SCN: NORMAL
FERRITIN SERPL-MCNC: 62 NG/ML (ref 8–150)
FOLATE SERPL-MCNC: 14.6 NG/ML
GFR SERPL CREATININE-BSD FRML MDRD: >90 ML/MIN/1.73M*2
GLUCOSE SERPL-MCNC: 83 MG/DL (ref 74–99)
HBA1C MFR BLD: 5.3 %
HCT VFR BLD AUTO: 38.2 % (ref 36–46)
HDLC SERPL-MCNC: 35 MG/DL
HGB BLD-MCNC: 12.4 G/DL (ref 12–16)
IMM GRANULOCYTES # BLD AUTO: 0.05 X10*3/UL (ref 0–0.7)
IMM GRANULOCYTES NFR BLD AUTO: 0.5 % (ref 0–0.9)
INR PPP: 1.1 (ref 0.9–1.1)
IRON SATN MFR SERPL: 17 % (ref 25–45)
IRON SERPL-MCNC: 69 UG/DL (ref 35–150)
LDLC SERPL CALC-MCNC: 84 MG/DL
LYMPHOCYTES # BLD AUTO: 2.31 X10*3/UL (ref 1.2–4.8)
LYMPHOCYTES NFR BLD AUTO: 21.3 %
MCH RBC QN AUTO: 28.1 PG (ref 26–34)
MCHC RBC AUTO-ENTMCNC: 32.5 G/DL (ref 32–36)
MCV RBC AUTO: 87 FL (ref 80–100)
MONOCYTES # BLD AUTO: 0.53 X10*3/UL (ref 0.1–1)
MONOCYTES NFR BLD AUTO: 4.9 %
NEUTROPHILS # BLD AUTO: 7.61 X10*3/UL (ref 1.2–7.7)
NEUTROPHILS NFR BLD AUTO: 70.2 %
NON HDL CHOLESTEROL: 123 MG/DL (ref 0–149)
NRBC BLD-RTO: 0 /100 WBCS (ref 0–0)
OPIATES UR QL SCN: NORMAL
OXYCODONE+OXYMORPHONE UR QL SCN: NORMAL
PCP UR QL SCN: NORMAL
PLATELET # BLD AUTO: 331 X10*3/UL (ref 150–450)
POTASSIUM SERPL-SCNC: 4 MMOL/L (ref 3.5–5.3)
PROT SERPL-MCNC: 7 G/DL (ref 6.4–8.2)
PROTHROMBIN TIME: 12 SECONDS (ref 9.8–12.8)
PTH-INTACT SERPL-MCNC: 58.2 PG/ML (ref 18.5–88)
RBC # BLD AUTO: 4.41 X10*6/UL (ref 4–5.2)
SODIUM SERPL-SCNC: 137 MMOL/L (ref 136–145)
T4 FREE SERPL-MCNC: 0.69 NG/DL (ref 0.61–1.12)
TIBC SERPL-MCNC: 398 UG/DL (ref 240–445)
TRIGL SERPL-MCNC: 195 MG/DL (ref 0–149)
TSH SERPL-ACNC: 0.77 MIU/L (ref 0.44–3.98)
UIBC SERPL-MCNC: 329 UG/DL (ref 110–370)
VIT B12 SERPL-MCNC: 260 PG/ML (ref 211–911)
VLDL: 39 MG/DL (ref 0–40)
WBC # BLD AUTO: 10.8 X10*3/UL (ref 4.4–11.3)

## 2023-11-09 PROCEDURE — 82728 ASSAY OF FERRITIN: CPT

## 2023-11-09 PROCEDURE — 85025 COMPLETE CBC W/AUTO DIFF WBC: CPT

## 2023-11-09 PROCEDURE — 84630 ASSAY OF ZINC: CPT

## 2023-11-09 PROCEDURE — 36415 COLL VENOUS BLD VENIPUNCTURE: CPT

## 2023-11-09 PROCEDURE — 83036 HEMOGLOBIN GLYCOSYLATED A1C: CPT

## 2023-11-09 PROCEDURE — 83970 ASSAY OF PARATHORMONE: CPT

## 2023-11-09 PROCEDURE — 84439 ASSAY OF FREE THYROXINE: CPT

## 2023-11-09 PROCEDURE — 84425 ASSAY OF VITAMIN B-1: CPT

## 2023-11-09 PROCEDURE — 82525 ASSAY OF COPPER: CPT

## 2023-11-09 PROCEDURE — 99204 OFFICE O/P NEW MOD 45 MIN: CPT | Performed by: NURSE PRACTITIONER

## 2023-11-09 PROCEDURE — 85730 THROMBOPLASTIN TIME PARTIAL: CPT

## 2023-11-09 PROCEDURE — 80307 DRUG TEST PRSMV CHEM ANLYZR: CPT

## 2023-11-09 PROCEDURE — 82607 VITAMIN B-12: CPT

## 2023-11-09 PROCEDURE — 83540 ASSAY OF IRON: CPT

## 2023-11-09 PROCEDURE — 80053 COMPREHEN METABOLIC PANEL: CPT

## 2023-11-09 PROCEDURE — 82746 ASSAY OF FOLIC ACID SERUM: CPT

## 2023-11-09 PROCEDURE — 85610 PROTHROMBIN TIME: CPT

## 2023-11-09 PROCEDURE — 84443 ASSAY THYROID STIM HORMONE: CPT

## 2023-11-09 PROCEDURE — 82306 VITAMIN D 25 HYDROXY: CPT

## 2023-11-09 PROCEDURE — 83550 IRON BINDING TEST: CPT

## 2023-11-09 PROCEDURE — 80061 LIPID PANEL: CPT

## 2023-11-09 PROCEDURE — 80323 ALKALOIDS NOS: CPT

## 2023-11-09 ASSESSMENT — ENCOUNTER SYMPTOMS
NEUROLOGICAL NEGATIVE: 1
CARDIOVASCULAR NEGATIVE: 1
GASTROINTESTINAL NEGATIVE: 1
MUSCULOSKELETAL NEGATIVE: 1
NECK NEGATIVE: 1
CONSTITUTIONAL NEGATIVE: 1
RESPIRATORY NEGATIVE: 1

## 2023-11-09 NOTE — PREPROCEDURE INSTRUCTIONS
Medication List            Accurate as of November 9, 2023  8:16 AM. Always use your most recent med list.                ibuprofen 200 mg tablet  Medication Adjustments for Surgery: Stop 7 days before surgery     Vitamin D3 50 MCG (2000 UT) tablet  Generic drug: cholecalciferol  Medication Adjustments for Surgery: Continue until night before surgery                      CONTACT SURGEON'S OFFICE IF YOU DEVELOP:  * Fever = 100.4 F   * New respiratory symptoms (e.g. cough, shortness of breath, respiratory distress, sore throat)  * Recent loss of taste or smell  *Flu like symptoms such as headache, fatigue or gastrointestinal symptoms  * You develop any open sores, shingles, burning or painful urination   AND/OR:  * You no longer wish to have the surgery.  * Any other personal circumstances change that may lead to the need to cancel or defer this surgery.  *You were admitted to any hospital within one week of your planned procedure.    SMOKING:  *Quitting smoking can make a huge difference to your health and recovery from surgery.    *If you need help with quitting, call [phone]-QUIT-NOW.    THE DAY BEFORE SURGERY:  *Do not eat any food after midnight the night before surgery.   *You are permitted to drink clear liquids (i.e. water, black coffee, tea, clear broth, apple juice) up to 2 hours before your surgery.  DIABETICS:  Please check fasting blood sugar  upon waking up.  If fasting sugar is <80 mg/dl, please drink 100ml/3oz of apple juice no later than 2 hours prior to surgery.      SURGICAL TIME  *You will be contacted between 2 p.m. and 6 p.m. the business day before your surgery with your arrival time.  *If you haven't received a call by 6pm, call [phone].  *Scheduled surgery times may change and you will be notified if this occurs-check your personal voicemail for any updates.    ON THE MORNING OF SURGERY:  *Wear comfortable, loose fitting clothing.   *Do not use moisturizers, creams, lotions or  perfume.  *All jewelry and valuables should be left at home.  *Prosthetic devices such as contact lenses, hearing aids, dentures, eyelash extensions, hairpins and body piercing must be removed before surgery.    BRING WITH YOU:  *Photo ID and insurance card  *Current list of medicines and allergies  *Pacemaker/Defibrillator/Heart stent cards  *CPAP machine and mask  *Slings/splints/crutches  *Copy of your complete Advanced Directive/DHPOA-if applicable  *Neurostimulator implant remote    PARKING AND ARRIVAL:  *Check in at the Main Entrance desk and let them know you are here for surgery.  *You will be directed to the 2nd floor surgical waiting area.    AFTER OUTPATIENT SURGERY:  *A responsible adult MUST accompany you at the time of discharge and stay with you for 24 hours after your surgery.  *You may NOT drive yourself home after surgery.  *You may use a taxi or ride sharing service ([employer], Uber) to return home ONLY if you are accompanied by a friend or family member.  *Instructions for resuming your medications will be provided by your surgeon.          NPO Instructions:    Do not eat any food after midnight the night before your surgery/procedure.  You may have clear liquids until TWO hours before surgery/procedure. This includes water, black tea/coffee, (no milk or cream) apple juice and electrolyte drinks (Gatorade).  You may chew gum up to TWO hours before your surgery/procedure.    Additional Instructions:     YOU HAVE REVIEWED THE MEDICATIONS ON THIS SHEET AND YOU VERIFY THESE ARE ALL THE MEDICATIONS AND OVER THE COUNTER MEDICATIONS THAT YOU TAKE .

## 2023-11-09 NOTE — CPM/PAT H&P
CPM/PAT Evaluation       Name: Erick Becerril (Erick Becerril)  /Age: 1995/28 y.o.     SURGEON :DR JENNIFER ANDREW   Surgery, Date, and Length:  Arthroscopy Right Ankle Repair Ligament right Ankle/Foot ,24  HPI:  This a 28 y.o. female who presents for presurgical evaluation for Arthroscopy Right Ankle Repair Ligament right Ankle/Foot  .Fell while hiking in May  . She states she has tried steroid injection , rest , boot without benefit . After discussion of the risks and benefits with Dr. Jewell ANDREW the patient elects to proceed with the planned procedure.       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 access or unexpected hospital admissions.  NO malignant hyperthermia and or pseudo cholinesterase deficiency.    The patient is not  a [employer] and will accept blood and blood products if medically indicated.   No history of blood transfusions .Type and screen not sent.     Social History  Social History     Substance and Sexual Activity   Drug Use Not Currently      Social History     Substance and Sexual Activity   Alcohol Use Not Currently      Social History     Tobacco Use   Smoking Status Never   Smokeless Tobacco Never          Family History   Problem Relation Name Age of Onset    Skin cancer Mother      No Known Problems Father      Diabetes Paternal Grandfather         No Known Allergies    Prior to Admission medications    Medication Sig Start Date  "End Date Taking? Authorizing Provider   cholecalciferol (Vitamin D3) 50 MCG (2000 UT) tablet Take 1 tablet (2,000 Units) by mouth once daily. 8/8/14   Historical Provider, MD   ibuprofen 200 mg tablet Take 2 tablets (400 mg) by mouth every 8 hours if needed for mild pain (1 - 3).    Historical Provider, MD DE ROS:   Constitutional:   neg    Neuro/Psych:   neg    Eyes:   Ears:   Nose:   Mouth:   neg    Throat:   Neck:   neg    Cardio:   neg    Respiratory:   neg    Endocrine:   GI:   neg    :   neg    Musculoskeletal:   neg    Hematologic:   neg    Skin:  neg        Physical Exam  Vitals reviewed.   Constitutional:       Appearance: Normal appearance.   HENT:      Head: Normocephalic and atraumatic.      Mouth/Throat:      Mouth: Mucous membranes are dry.   Eyes:      Extraocular Movements: Extraocular movements intact.   Cardiovascular:      Rate and Rhythm: Normal rate and regular rhythm.   Pulmonary:      Effort: Pulmonary effort is normal.      Breath sounds: Normal breath sounds.   Musculoskeletal:         General: Normal range of motion.      Cervical back: Normal range of motion.   Skin:     General: Skin is warm and dry.   Neurological:      Mental Status: She is oriented to person, place, and time.   Psychiatric:         Mood and Affect: Mood normal.         Behavior: Behavior normal.          PAT AIRWAY:   Airway:     Mallampati::  I  normal      /65   Pulse 82   Temp 36.5 °C (97.7 °F)   Resp 18   Ht 1.6 m (5' 3\")   Wt 121 kg (267 lb 13.7 oz)   LMP 07/29/2022   SpO2 99%   BMI 47.45 kg/m²     Lab Results   Component Value Date    WBC 7.3 09/21/2023    HGB 12.9 09/21/2023    HCT 38.3 09/21/2023    MCV 85 09/21/2023     09/21/2023     Lab Results   Component Value Date    GLUCOSE 154 (H) 09/21/2023    CALCIUM 9.0 09/21/2023     09/21/2023    K 3.6 09/21/2023    CO2 28 09/21/2023     09/21/2023    BUN 10 09/21/2023    CREATININE 0.73 09/21/2023 "         ASSESSMENT/PLAN    Patient is a  28 year-old  scheduled for Arthroscopy Right Ankle Repair Ligament right Ankle/Foot  with Dr. CORCORAN   on  11/13/23 .  CARDIOVASCULAR:  RCRI score / Risk: The patients score is 0 based on history . Per ACC/AHA guidelines this places her  at  3.9% risk for MACE undergoing a low  risk procedure . The patient has the following risk factors:  Functional Capacity: The patients exercise tolerance is  4  METS. This is based on the patients. Limited only due to ankle pain  Patient denies  active cardiac symptoms or anginal equivalents .      PULMONARY:  The patient has the following factors that place them at increased risk of perioperative pulmonary complications;BMI greater than .  Postoperatively the patient would benefit from early pulmonary toilet/incentive spirometry q 1-2 hours while awake/pulse oximetry/cautious use of respiratory depressant medications such as opioids/elevate the HOB/oral hygiene.    ELEVATED RANDOM GLUCOSE:  HgbA1c pending.A1C was 5.3%    DVT:  CAPRINI SCORE=5  The patient has the following factors that increase her Risk for thrombus formation ; Virchow's triad ,age>30, BMI> 45 , lower extremity procedure .    Recommendations: DVT prophylaxis  per Dr. Corcoran  protocol . SCD's, WEI's, and early ambulation are recommended. Heparin or LMWH is recommended for the very high risk .      Risk assessment complete.  Patient is scheduled for  low  surgical risk procedure.  Patient is considered an acceptable  risk to proceed with the planned procedure.      Preoperative medication instructions were provided and reviewed with the patient.  Any additional testing or evaluation was explained to the patient.  Nothing by mouth instructions were discussed and patient's questions were answered prior to conclusion to this encounter.  Patient verbalized understanding of preoperative instructions given in preadmission testing; discharge instructions available in EMR.

## 2023-11-09 NOTE — H&P (VIEW-ONLY)
CPM/PAT Evaluation       Name: Erick Becerril (Erick Becerril)  /Age: 1995/28 y.o.     SURGEON :DR JENNIFER ANDREW   Surgery, Date, and Length:  Arthroscopy Right Ankle Repair Ligament right Ankle/Foot ,24  HPI:  This a 28 y.o. female who presents for presurgical evaluation for Arthroscopy Right Ankle Repair Ligament right Ankle/Foot  .Fell while hiking in May  . She states she has tried steroid injection , rest , boot without benefit . After discussion of the risks and benefits with Dr. Jewell ANDREW the patient elects to proceed with the planned procedure.       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 access or unexpected hospital admissions.  NO malignant hyperthermia and or pseudo cholinesterase deficiency.    The patient is not  a [employer] and will accept blood and blood products if medically indicated.   No history of blood transfusions .Type and screen not sent.     Social History  Social History     Substance and Sexual Activity   Drug Use Not Currently      Social History     Substance and Sexual Activity   Alcohol Use Not Currently      Social History     Tobacco Use   Smoking Status Never   Smokeless Tobacco Never          Family History   Problem Relation Name Age of Onset    Skin cancer Mother      No Known Problems Father      Diabetes Paternal Grandfather         No Known Allergies    Prior to Admission medications    Medication Sig Start Date  "End Date Taking? Authorizing Provider   cholecalciferol (Vitamin D3) 50 MCG (2000 UT) tablet Take 1 tablet (2,000 Units) by mouth once daily. 8/8/14   Historical Provider, MD   ibuprofen 200 mg tablet Take 2 tablets (400 mg) by mouth every 8 hours if needed for mild pain (1 - 3).    Historical Provider, MD DE ROS:   Constitutional:   neg    Neuro/Psych:   neg    Eyes:   Ears:   Nose:   Mouth:   neg    Throat:   Neck:   neg    Cardio:   neg    Respiratory:   neg    Endocrine:   GI:   neg    :   neg    Musculoskeletal:   neg    Hematologic:   neg    Skin:  neg        Physical Exam  Vitals reviewed.   Constitutional:       Appearance: Normal appearance.   HENT:      Head: Normocephalic and atraumatic.      Mouth/Throat:      Mouth: Mucous membranes are dry.   Eyes:      Extraocular Movements: Extraocular movements intact.   Cardiovascular:      Rate and Rhythm: Normal rate and regular rhythm.   Pulmonary:      Effort: Pulmonary effort is normal.      Breath sounds: Normal breath sounds.   Musculoskeletal:         General: Normal range of motion.      Cervical back: Normal range of motion.   Skin:     General: Skin is warm and dry.   Neurological:      Mental Status: She is oriented to person, place, and time.   Psychiatric:         Mood and Affect: Mood normal.         Behavior: Behavior normal.          PAT AIRWAY:   Airway:     Mallampati::  I  normal      /65   Pulse 82   Temp 36.5 °C (97.7 °F)   Resp 18   Ht 1.6 m (5' 3\")   Wt 121 kg (267 lb 13.7 oz)   LMP 07/29/2022   SpO2 99%   BMI 47.45 kg/m²     Lab Results   Component Value Date    WBC 7.3 09/21/2023    HGB 12.9 09/21/2023    HCT 38.3 09/21/2023    MCV 85 09/21/2023     09/21/2023     Lab Results   Component Value Date    GLUCOSE 154 (H) 09/21/2023    CALCIUM 9.0 09/21/2023     09/21/2023    K 3.6 09/21/2023    CO2 28 09/21/2023     09/21/2023    BUN 10 09/21/2023    CREATININE 0.73 09/21/2023 "         ASSESSMENT/PLAN    Patient is a  28 year-old  scheduled for Arthroscopy Right Ankle Repair Ligament right Ankle/Foot  with Dr. CORCORAN   on  11/13/23 .  CARDIOVASCULAR:  RCRI score / Risk: The patients score is 0 based on history . Per ACC/AHA guidelines this places her  at  3.9% risk for MACE undergoing a low  risk procedure . The patient has the following risk factors:  Functional Capacity: The patients exercise tolerance is  4  METS. This is based on the patients. Limited only due to ankle pain  Patient denies  active cardiac symptoms or anginal equivalents .      PULMONARY:  The patient has the following factors that place them at increased risk of perioperative pulmonary complications;BMI greater than .  Postoperatively the patient would benefit from early pulmonary toilet/incentive spirometry q 1-2 hours while awake/pulse oximetry/cautious use of respiratory depressant medications such as opioids/elevate the HOB/oral hygiene.    ELEVATED RANDOM GLUCOSE:  HgbA1c pending.A1C was 5.3%    DVT:  CAPRINI SCORE=5  The patient has the following factors that increase her Risk for thrombus formation ; Virchow's triad ,age>30, BMI> 45 , lower extremity procedure .    Recommendations: DVT prophylaxis  per Dr. Corcoran  protocol . SCD's, WEI's, and early ambulation are recommended. Heparin or LMWH is recommended for the very high risk .      Risk assessment complete.  Patient is scheduled for  low  surgical risk procedure.  Patient is considered an acceptable  risk to proceed with the planned procedure.      Preoperative medication instructions were provided and reviewed with the patient.  Any additional testing or evaluation was explained to the patient.  Nothing by mouth instructions were discussed and patient's questions were answered prior to conclusion to this encounter.  Patient verbalized understanding of preoperative instructions given in preadmission testing; discharge instructions available in EMR.

## 2023-11-10 ENCOUNTER — APPOINTMENT (OUTPATIENT)
Dept: CARDIOLOGY | Facility: CLINIC | Age: 28
End: 2023-11-10
Payer: COMMERCIAL

## 2023-11-10 LAB
COPPER SERPL-MCNC: 117.7 UG/DL (ref 80–155)
ZINC SERPL-MCNC: 71.9 UG/DL (ref 60–120)

## 2023-11-12 ENCOUNTER — ANESTHESIA EVENT (OUTPATIENT)
Dept: OPERATING ROOM | Facility: HOSPITAL | Age: 28
End: 2023-11-12
Payer: COMMERCIAL

## 2023-11-13 ENCOUNTER — ANESTHESIA (OUTPATIENT)
Dept: OPERATING ROOM | Facility: HOSPITAL | Age: 28
End: 2023-11-13
Payer: COMMERCIAL

## 2023-11-13 ENCOUNTER — HOSPITAL ENCOUNTER (OUTPATIENT)
Facility: HOSPITAL | Age: 28
Setting detail: OUTPATIENT SURGERY
Discharge: HOME | End: 2023-11-13
Attending: STUDENT IN AN ORGANIZED HEALTH CARE EDUCATION/TRAINING PROGRAM | Admitting: STUDENT IN AN ORGANIZED HEALTH CARE EDUCATION/TRAINING PROGRAM
Payer: COMMERCIAL

## 2023-11-13 ENCOUNTER — APPOINTMENT (OUTPATIENT)
Dept: RADIOLOGY | Facility: HOSPITAL | Age: 28
End: 2023-11-13
Payer: COMMERCIAL

## 2023-11-13 VITALS
HEIGHT: 63 IN | DIASTOLIC BLOOD PRESSURE: 89 MMHG | HEART RATE: 98 BPM | BODY MASS INDEX: 47.62 KG/M2 | TEMPERATURE: 99.1 F | SYSTOLIC BLOOD PRESSURE: 137 MMHG | WEIGHT: 268.74 LBS | OXYGEN SATURATION: 94 % | RESPIRATION RATE: 19 BRPM

## 2023-11-13 DIAGNOSIS — M25.871 ANKLE IMPINGEMENT SYNDROME, RIGHT: ICD-10-CM

## 2023-11-13 DIAGNOSIS — M24.071: ICD-10-CM

## 2023-11-13 DIAGNOSIS — M25.371 OTHER INSTABILITY, RIGHT ANKLE: ICD-10-CM

## 2023-11-13 DIAGNOSIS — S93.491D SPRAIN OF ANTERIOR TALOFIBULAR LIGAMENT OF RIGHT ANKLE, SUBSEQUENT ENCOUNTER: Primary | ICD-10-CM

## 2023-11-13 DIAGNOSIS — R29.898 ANKLE WEAKNESS: ICD-10-CM

## 2023-11-13 DIAGNOSIS — M24.074: ICD-10-CM

## 2023-11-13 LAB
COTININE SERPL-MCNC: <5 NG/ML
NICOTINE SERPL-MCNC: <5 NG/ML
PREGNANCY TEST URINE, POC: NEGATIVE

## 2023-11-13 PROCEDURE — 2500000005 HC RX 250 GENERAL PHARMACY W/O HCPCS: Performed by: ANESTHESIOLOGIST ASSISTANT

## 2023-11-13 PROCEDURE — 7100000001 HC RECOVERY ROOM TIME - INITIAL BASE CHARGE: Performed by: STUDENT IN AN ORGANIZED HEALTH CARE EDUCATION/TRAINING PROGRAM

## 2023-11-13 PROCEDURE — 2780000003 HC OR 278 NO HCPCS: Performed by: STUDENT IN AN ORGANIZED HEALTH CARE EDUCATION/TRAINING PROGRAM

## 2023-11-13 PROCEDURE — C1713 ANCHOR/SCREW BN/BN,TIS/BN: HCPCS | Performed by: STUDENT IN AN ORGANIZED HEALTH CARE EDUCATION/TRAINING PROGRAM

## 2023-11-13 PROCEDURE — 7100000010 HC PHASE TWO TIME - EACH INCREMENTAL 1 MINUTE: Performed by: STUDENT IN AN ORGANIZED HEALTH CARE EDUCATION/TRAINING PROGRAM

## 2023-11-13 PROCEDURE — 2720000007 HC OR 272 NO HCPCS: Performed by: STUDENT IN AN ORGANIZED HEALTH CARE EDUCATION/TRAINING PROGRAM

## 2023-11-13 PROCEDURE — 27698 REPAIR OF ANKLE LIGAMENT: CPT | Performed by: STUDENT IN AN ORGANIZED HEALTH CARE EDUCATION/TRAINING PROGRAM

## 2023-11-13 PROCEDURE — 2500000004 HC RX 250 GENERAL PHARMACY W/ HCPCS (ALT 636 FOR OP/ED): Performed by: ANESTHESIOLOGIST ASSISTANT

## 2023-11-13 PROCEDURE — 7100000002 HC RECOVERY ROOM TIME - EACH INCREMENTAL 1 MINUTE: Performed by: STUDENT IN AN ORGANIZED HEALTH CARE EDUCATION/TRAINING PROGRAM

## 2023-11-13 PROCEDURE — A29892 PR ANKLE SCOPE,AID REPAIR FX,BONE DEFCT: Performed by: ANESTHESIOLOGIST ASSISTANT

## 2023-11-13 PROCEDURE — 3700000001 HC GENERAL ANESTHESIA TIME - INITIAL BASE CHARGE: Performed by: STUDENT IN AN ORGANIZED HEALTH CARE EDUCATION/TRAINING PROGRAM

## 2023-11-13 PROCEDURE — 76942 ECHO GUIDE FOR BIOPSY: CPT | Performed by: STUDENT IN AN ORGANIZED HEALTH CARE EDUCATION/TRAINING PROGRAM

## 2023-11-13 PROCEDURE — 3700000002 HC GENERAL ANESTHESIA TIME - EACH INCREMENTAL 1 MINUTE: Performed by: STUDENT IN AN ORGANIZED HEALTH CARE EDUCATION/TRAINING PROGRAM

## 2023-11-13 PROCEDURE — A29892 PR ANKLE SCOPE,AID REPAIR FX,BONE DEFCT: Performed by: STUDENT IN AN ORGANIZED HEALTH CARE EDUCATION/TRAINING PROGRAM

## 2023-11-13 PROCEDURE — 27620 EXPLORE/TREAT ANKLE JOINT: CPT | Performed by: STUDENT IN AN ORGANIZED HEALTH CARE EDUCATION/TRAINING PROGRAM

## 2023-11-13 PROCEDURE — 7100000009 HC PHASE TWO TIME - INITIAL BASE CHARGE: Performed by: STUDENT IN AN ORGANIZED HEALTH CARE EDUCATION/TRAINING PROGRAM

## 2023-11-13 PROCEDURE — 2500000004 HC RX 250 GENERAL PHARMACY W/ HCPCS (ALT 636 FOR OP/ED): Performed by: STUDENT IN AN ORGANIZED HEALTH CARE EDUCATION/TRAINING PROGRAM

## 2023-11-13 PROCEDURE — 2500000005 HC RX 250 GENERAL PHARMACY W/O HCPCS: Performed by: STUDENT IN AN ORGANIZED HEALTH CARE EDUCATION/TRAINING PROGRAM

## 2023-11-13 PROCEDURE — 76000 FLUOROSCOPY <1 HR PHYS/QHP: CPT | Mod: RT

## 2023-11-13 PROCEDURE — 29898 ANKLE ARTHROSCOPY/SURGERY: CPT | Performed by: STUDENT IN AN ORGANIZED HEALTH CARE EDUCATION/TRAINING PROGRAM

## 2023-11-13 PROCEDURE — 3600000004 HC OR TIME - INITIAL BASE CHARGE - PROCEDURE LEVEL FOUR: Performed by: STUDENT IN AN ORGANIZED HEALTH CARE EDUCATION/TRAINING PROGRAM

## 2023-11-13 PROCEDURE — 3600000009 HC OR TIME - EACH INCREMENTAL 1 MINUTE - PROCEDURE LEVEL FOUR: Performed by: STUDENT IN AN ORGANIZED HEALTH CARE EDUCATION/TRAINING PROGRAM

## 2023-11-13 PROCEDURE — A4217 STERILE WATER/SALINE, 500 ML: HCPCS | Performed by: STUDENT IN AN ORGANIZED HEALTH CARE EDUCATION/TRAINING PROGRAM

## 2023-11-13 DEVICE — DX FIBERTAK SUTURE ANCHOR, ST & NDLS
Type: IMPLANTABLE DEVICE | Site: ANKLE | Status: FUNCTIONAL
Brand: ARTHREX®

## 2023-11-13 RX ORDER — SODIUM CHLORIDE, SODIUM LACTATE, POTASSIUM CHLORIDE, CALCIUM CHLORIDE 600; 310; 30; 20 MG/100ML; MG/100ML; MG/100ML; MG/100ML
100 INJECTION, SOLUTION INTRAVENOUS CONTINUOUS
Status: DISCONTINUED | OUTPATIENT
Start: 2023-11-13 | End: 2023-11-13 | Stop reason: SDUPTHER

## 2023-11-13 RX ORDER — PROPOFOL 10 MG/ML
INJECTION, EMULSION INTRAVENOUS AS NEEDED
Status: DISCONTINUED | OUTPATIENT
Start: 2023-11-13 | End: 2023-11-13

## 2023-11-13 RX ORDER — LIDOCAINE HYDROCHLORIDE 10 MG/ML
0.1 INJECTION, SOLUTION EPIDURAL; INFILTRATION; INTRACAUDAL; PERINEURAL ONCE
Status: DISCONTINUED | OUTPATIENT
Start: 2023-11-13 | End: 2023-11-13 | Stop reason: HOSPADM

## 2023-11-13 RX ORDER — ENOXAPARIN SODIUM 100 MG/ML
30 INJECTION SUBCUTANEOUS 2 TIMES DAILY
Qty: 84 EACH | Refills: 0 | Status: SHIPPED | OUTPATIENT
Start: 2023-11-13 | End: 2023-11-13 | Stop reason: HOSPADM

## 2023-11-13 RX ORDER — PROMETHAZINE HYDROCHLORIDE 25 MG/ML
6.25 INJECTION, SOLUTION INTRAMUSCULAR; INTRAVENOUS ONCE AS NEEDED
Status: DISCONTINUED | OUTPATIENT
Start: 2023-11-13 | End: 2023-11-13 | Stop reason: HOSPADM

## 2023-11-13 RX ORDER — DOCUSATE SODIUM 100 MG/1
100 CAPSULE, LIQUID FILLED ORAL 2 TIMES DAILY PRN
Qty: 20 CAPSULE | Refills: 0 | Status: SHIPPED | OUTPATIENT
Start: 2023-11-13 | End: 2023-11-23

## 2023-11-13 RX ORDER — SODIUM CHLORIDE, SODIUM LACTATE, POTASSIUM CHLORIDE, CALCIUM CHLORIDE 600; 310; 30; 20 MG/100ML; MG/100ML; MG/100ML; MG/100ML
100 INJECTION, SOLUTION INTRAVENOUS CONTINUOUS
Status: DISCONTINUED | OUTPATIENT
Start: 2023-11-13 | End: 2023-11-13 | Stop reason: HOSPADM

## 2023-11-13 RX ORDER — METOPROLOL TARTRATE 1 MG/ML
INJECTION, SOLUTION INTRAVENOUS AS NEEDED
Status: DISCONTINUED | OUTPATIENT
Start: 2023-11-13 | End: 2023-11-13

## 2023-11-13 RX ORDER — ONDANSETRON HYDROCHLORIDE 2 MG/ML
INJECTION, SOLUTION INTRAVENOUS AS NEEDED
Status: DISCONTINUED | OUTPATIENT
Start: 2023-11-13 | End: 2023-11-13

## 2023-11-13 RX ORDER — ONDANSETRON 4 MG/1
4 TABLET, ORALLY DISINTEGRATING ORAL EVERY 8 HOURS PRN
Qty: 14 TABLET | Refills: 0 | Status: SHIPPED | OUTPATIENT
Start: 2023-11-13 | End: 2023-11-20

## 2023-11-13 RX ORDER — DIPHENHYDRAMINE HYDROCHLORIDE 50 MG/ML
12.5 INJECTION INTRAMUSCULAR; INTRAVENOUS ONCE AS NEEDED
Status: DISCONTINUED | OUTPATIENT
Start: 2023-11-13 | End: 2023-11-13 | Stop reason: HOSPADM

## 2023-11-13 RX ORDER — HYDROMORPHONE HYDROCHLORIDE 1 MG/ML
INJECTION, SOLUTION INTRAMUSCULAR; INTRAVENOUS; SUBCUTANEOUS AS NEEDED
Status: DISCONTINUED | OUTPATIENT
Start: 2023-11-13 | End: 2023-11-13

## 2023-11-13 RX ORDER — TRANEXAMIC ACID 10 MG/ML
INJECTION, SOLUTION INTRAVENOUS AS NEEDED
Status: DISCONTINUED | OUTPATIENT
Start: 2023-11-13 | End: 2023-11-13

## 2023-11-13 RX ORDER — LABETALOL HYDROCHLORIDE 5 MG/ML
5 INJECTION, SOLUTION INTRAVENOUS ONCE AS NEEDED
Status: DISCONTINUED | OUTPATIENT
Start: 2023-11-13 | End: 2023-11-13 | Stop reason: HOSPADM

## 2023-11-13 RX ORDER — DEXAMETHASONE SODIUM PHOSPHATE 4 MG/ML
INJECTION, SOLUTION INTRA-ARTICULAR; INTRALESIONAL; INTRAMUSCULAR; INTRAVENOUS; SOFT TISSUE AS NEEDED
Status: DISCONTINUED | OUTPATIENT
Start: 2023-11-13 | End: 2023-11-13

## 2023-11-13 RX ORDER — OXYCODONE HYDROCHLORIDE 5 MG/1
5 TABLET ORAL EVERY 4 HOURS PRN
Status: DISCONTINUED | OUTPATIENT
Start: 2023-11-13 | End: 2023-11-13 | Stop reason: HOSPADM

## 2023-11-13 RX ORDER — MIDAZOLAM HYDROCHLORIDE 1 MG/ML
INJECTION, SOLUTION INTRAMUSCULAR; INTRAVENOUS AS NEEDED
Status: DISCONTINUED | OUTPATIENT
Start: 2023-11-13 | End: 2023-11-13

## 2023-11-13 RX ORDER — ROCURONIUM BROMIDE 10 MG/ML
INJECTION, SOLUTION INTRAVENOUS AS NEEDED
Status: DISCONTINUED | OUTPATIENT
Start: 2023-11-13 | End: 2023-11-13

## 2023-11-13 RX ORDER — LIDOCAINE HYDROCHLORIDE 20 MG/ML
INJECTION, SOLUTION INFILTRATION; PERINEURAL AS NEEDED
Status: DISCONTINUED | OUTPATIENT
Start: 2023-11-13 | End: 2023-11-13

## 2023-11-13 RX ORDER — ONDANSETRON HYDROCHLORIDE 2 MG/ML
4 INJECTION, SOLUTION INTRAVENOUS ONCE AS NEEDED
Status: DISCONTINUED | OUTPATIENT
Start: 2023-11-13 | End: 2023-11-13 | Stop reason: HOSPADM

## 2023-11-13 RX ORDER — CEFAZOLIN 1 G/1
INJECTION, POWDER, FOR SOLUTION INTRAVENOUS AS NEEDED
Status: DISCONTINUED | OUTPATIENT
Start: 2023-11-13 | End: 2023-11-13

## 2023-11-13 RX ORDER — SODIUM CHLORIDE 0.9 G/100ML
IRRIGANT IRRIGATION AS NEEDED
Status: DISCONTINUED | OUTPATIENT
Start: 2023-11-13 | End: 2023-11-13 | Stop reason: HOSPADM

## 2023-11-13 RX ORDER — FENTANYL CITRATE 50 UG/ML
INJECTION, SOLUTION INTRAMUSCULAR; INTRAVENOUS AS NEEDED
Status: DISCONTINUED | OUTPATIENT
Start: 2023-11-13 | End: 2023-11-13

## 2023-11-13 RX ORDER — OXYCODONE HYDROCHLORIDE 5 MG/1
5 TABLET ORAL EVERY 4 HOURS PRN
Qty: 20 TABLET | Refills: 0 | Status: SHIPPED | OUTPATIENT
Start: 2023-11-13 | End: 2023-11-18

## 2023-11-13 RX ORDER — LABETALOL HYDROCHLORIDE 5 MG/ML
INJECTION, SOLUTION INTRAVENOUS AS NEEDED
Status: DISCONTINUED | OUTPATIENT
Start: 2023-11-13 | End: 2023-11-13

## 2023-11-13 RX ORDER — ENOXAPARIN SODIUM 100 MG/ML
40 INJECTION SUBCUTANEOUS 2 TIMES DAILY
Qty: 84 EACH | Refills: 0 | Status: SHIPPED | OUTPATIENT
Start: 2023-11-13 | End: 2023-12-25

## 2023-11-13 RX ADMIN — LABETALOL HYDROCHLORIDE 5 MG: 5 INJECTION INTRAVENOUS at 09:57

## 2023-11-13 RX ADMIN — CEFAZOLIN 3 G: 1 INJECTION, POWDER, FOR SOLUTION INTRAMUSCULAR; INTRAVENOUS at 07:45

## 2023-11-13 RX ADMIN — SODIUM CHLORIDE, POTASSIUM CHLORIDE, SODIUM LACTATE AND CALCIUM CHLORIDE 100 ML/HR: 600; 310; 30; 20 INJECTION, SOLUTION INTRAVENOUS at 06:50

## 2023-11-13 RX ADMIN — METOPROLOL TARTRATE 2 MG: 5 INJECTION INTRAVENOUS at 08:43

## 2023-11-13 RX ADMIN — DEXAMETHASONE SODIUM PHOSPHATE 4 MG: 4 INJECTION, SOLUTION INTRAMUSCULAR; INTRAVENOUS at 10:37

## 2023-11-13 RX ADMIN — LIDOCAINE HYDROCHLORIDE 100 MG: 20 INJECTION, SOLUTION INFILTRATION; PERINEURAL at 07:40

## 2023-11-13 RX ADMIN — FENTANYL CITRATE 50 MCG: 50 INJECTION, SOLUTION INTRAMUSCULAR; INTRAVENOUS at 07:49

## 2023-11-13 RX ADMIN — METOPROLOL TARTRATE 1 MG: 5 INJECTION INTRAVENOUS at 09:01

## 2023-11-13 RX ADMIN — HYDROMORPHONE HYDROCHLORIDE 0.2 MG: 1 INJECTION, SOLUTION INTRAMUSCULAR; INTRAVENOUS; SUBCUTANEOUS at 11:21

## 2023-11-13 RX ADMIN — ROCURONIUM BROMIDE 10 MG: 10 INJECTION, SOLUTION INTRAVENOUS at 09:27

## 2023-11-13 RX ADMIN — PROPOFOL 200 MG: 10 INJECTION, EMULSION INTRAVENOUS at 07:40

## 2023-11-13 RX ADMIN — LABETALOL HYDROCHLORIDE 10 MG: 5 INJECTION INTRAVENOUS at 10:54

## 2023-11-13 RX ADMIN — FENTANYL CITRATE 50 MCG: 50 INJECTION, SOLUTION INTRAMUSCULAR; INTRAVENOUS at 08:12

## 2023-11-13 RX ADMIN — ROCURONIUM BROMIDE 50 MG: 10 INJECTION, SOLUTION INTRAVENOUS at 07:40

## 2023-11-13 RX ADMIN — LABETALOL HYDROCHLORIDE 5 MG: 5 INJECTION INTRAVENOUS at 09:45

## 2023-11-13 RX ADMIN — HYDROMORPHONE HYDROCHLORIDE 0.4 MG: 1 INJECTION, SOLUTION INTRAMUSCULAR; INTRAVENOUS; SUBCUTANEOUS at 09:45

## 2023-11-13 RX ADMIN — ROCURONIUM BROMIDE 20 MG: 10 INJECTION, SOLUTION INTRAVENOUS at 10:55

## 2023-11-13 RX ADMIN — ROCURONIUM BROMIDE 20 MG: 10 INJECTION, SOLUTION INTRAVENOUS at 08:12

## 2023-11-13 RX ADMIN — ROCURONIUM BROMIDE 20 MG: 10 INJECTION, SOLUTION INTRAVENOUS at 08:47

## 2023-11-13 RX ADMIN — TRANEXAMIC ACID 1000 MG: 10 INJECTION, SOLUTION INTRAVENOUS at 07:45

## 2023-11-13 RX ADMIN — ROCURONIUM BROMIDE 20 MG: 10 INJECTION, SOLUTION INTRAVENOUS at 09:47

## 2023-11-13 RX ADMIN — SODIUM CHLORIDE, POTASSIUM CHLORIDE, SODIUM LACTATE AND CALCIUM CHLORIDE 100 ML/HR: 600; 310; 30; 20 INJECTION, SOLUTION INTRAVENOUS at 12:42

## 2023-11-13 RX ADMIN — METOPROLOL TARTRATE 2 MG: 5 INJECTION INTRAVENOUS at 08:54

## 2023-11-13 RX ADMIN — Medication 4 L/MIN: at 12:18

## 2023-11-13 RX ADMIN — HYDROMORPHONE HYDROCHLORIDE 0.4 MG: 1 INJECTION, SOLUTION INTRAMUSCULAR; INTRAVENOUS; SUBCUTANEOUS at 09:59

## 2023-11-13 RX ADMIN — ONDANSETRON 4 MG: 2 INJECTION INTRAMUSCULAR; INTRAVENOUS at 11:27

## 2023-11-13 RX ADMIN — CEFAZOLIN 3 G: 1 INJECTION, POWDER, FOR SOLUTION INTRAMUSCULAR; INTRAVENOUS at 11:45

## 2023-11-13 RX ADMIN — SUGAMMADEX 200 MG: 100 INJECTION, SOLUTION INTRAVENOUS at 12:09

## 2023-11-13 RX ADMIN — SODIUM CHLORIDE, SODIUM LACTATE, POTASSIUM CHLORIDE, AND CALCIUM CHLORIDE: 600; 310; 30; 20 INJECTION, SOLUTION INTRAVENOUS at 07:24

## 2023-11-13 RX ADMIN — MIDAZOLAM HYDROCHLORIDE 2 MG: 1 INJECTION, SOLUTION INTRAMUSCULAR; INTRAVENOUS at 07:33

## 2023-11-13 RX ADMIN — ROCURONIUM BROMIDE 20 MG: 10 INJECTION, SOLUTION INTRAVENOUS at 11:20

## 2023-11-13 ASSESSMENT — COLUMBIA-SUICIDE SEVERITY RATING SCALE - C-SSRS
1. IN THE PAST MONTH, HAVE YOU WISHED YOU WERE DEAD OR WISHED YOU COULD GO TO SLEEP AND NOT WAKE UP?: NO
2. HAVE YOU ACTUALLY HAD ANY THOUGHTS OF KILLING YOURSELF?: NO
6. HAVE YOU EVER DONE ANYTHING, STARTED TO DO ANYTHING, OR PREPARED TO DO ANYTHING TO END YOUR LIFE?: NO

## 2023-11-13 ASSESSMENT — PAIN SCALES - GENERAL
PAINLEVEL_OUTOF10: 0 - NO PAIN

## 2023-11-13 ASSESSMENT — PAIN - FUNCTIONAL ASSESSMENT
PAIN_FUNCTIONAL_ASSESSMENT: 0-10

## 2023-11-13 NOTE — ANESTHESIA POSTPROCEDURE EVALUATION
Patient: Erick Becerril    Procedure Summary       Date: 11/13/23 Room / Location: [facility] A OR 11 / Virtual [facility] A OR    Anesthesia Start: 0724 Anesthesia Stop: 1216    Procedures:       Right Ankle Arthroscopy; extensive debridement (Right: Ankle)      Ligament Repair; medial and lateral (Right: Ankle) Diagnosis:       Other instability, right ankle      Sprain of anterior talofibular ligament of right ankle, subsequent encounter      Ankle impingement syndrome, right      (Other instability, right ankle [M25.371])      (Sprain of anterior talofibular ligament of right ankle, subsequent encounter [S93.491D])      (Ankle impingement syndrome, right [M25.871])    Surgeons: Samuel Corcoran MD Responsible Provider: Raghavendra Wolfe MD    Anesthesia Type: general ASA Status: 3            Anesthesia Type: general    Vitals:    11/13/23 0627   BP: 137/88   Pulse: 100   Resp: 18   Temp: 37 °C (98.6 °F)   SpO2: 98%         Anesthesia Post Evaluation    Patient location during evaluation: bedside  Level of consciousness: awake  Pain management: adequate  Multimodal analgesia pain management approach  Airway patency: patent  Cardiovascular status: stable  Respiratory status: spontaneous ventilation and unassisted  Hydration status: acceptable  Comments: No significant PONV.        No notable events documented.

## 2023-11-13 NOTE — ANESTHESIA PROCEDURE NOTES
Peripheral Block    Patient location during procedure: pre-op  Start time: 11/13/2023 7:00 AM  End time: 11/13/2023 7:05 AM  Reason for block: at surgeon's request and post-op pain management  Staffing  Performed: attending   Authorized by: Raghavendra Wolfe MD    Performed by: Raghavendra Wolfe MD  Preanesthetic Checklist  Completed: patient identified, IV checked, site marked, risks and benefits discussed, surgical consent, monitors and equipment checked, pre-op evaluation and timeout performed   Timeout performed at:   Peripheral Block  Patient position: laying flat  Prep: ChloraPrep and site prepped and draped  Patient monitoring: continuous pulse ox, heart rate and cardiac monitor  Block type: popliteal  Injection technique: single-shot  Guidance: ultrasound guided  Local infiltration: lidocaine  Infiltration strength: 1 %  Dose: 3 mL  Needle  Needle type: short-bevel   Needle gauge: 22 G  Needle length: 8 cm  Needle localization: ultrasound guidance (Image saved in chart)  Test dose: negative  Assessment  Injection assessment: negative aspiration for heme, local visualized surrounding nerve on ultrasound, no paresthesia on injection and incremental injection  Heart rate change: no  Slow fractionated injection: yes  Additional Notes  Procedure related and patient specific complications discussed with patient after focussed neurological history.   Anticoagulation (if any) held per [facility] guidelines.  .Aseptic prep and drape. 30 ml of 0.5% bupivacaine with epi 1 in 200 K and decadron 4 mg injected using 22 G stimuplex needle.. No resistance to injection or high injection pressures based on tactile feedback. Meaningful verbal communication maintained throughout procedure. Patient tolerated procedure well with no immediate complications. Ultrasound image saved to chart. Written instructions with precautions about how to take care of insensate extremity will be provided by PACU.

## 2023-11-13 NOTE — ANESTHESIA PREPROCEDURE EVALUATION
Patient: Erick Becerril    Procedure Information       Date/Time: 11/13/23 0730    Procedures:       Right Ankle Arthroscopy (Right: Ankle)      Ligament Repair (Right: Ankle) - 2    Location: [facility] A OR 11 / Virtual [facility] A OR    Surgeons: Samuel Corcoran MD            Relevant Problems   Endocrine   (+) Class 3 severe obesity due to excess calories with body mass index (BMI) of 45.0 to 49.9 in adult (CMS/HCC)   (+) Hyperthyroidism   (+) Morbid obesity (CMS/HCC)      Infectious Disease   (+) Bacterial vaginosis   (+) Furuncle of umbilicus   (+) Hand, foot and mouth disease   (+) Wound infection       Clinical information reviewed:   Tobacco  Allergies  Meds   Med Hx  Surg Hx   Fam Hx  Soc Hx        NPO Detail:  No data recorded     Physical Exam    Airway  Mallampati: II  TM distance: >3 FB  Neck ROM: full     Cardiovascular   Rhythm: regular  Rate: normal     Dental    Pulmonary   Breath sounds clear to auscultation     Abdominal            Anesthesia Plan    ASA 3     general     intravenous induction   Anesthetic plan and risks discussed with patient.

## 2023-11-13 NOTE — DISCHARGE INSTRUCTIONS
"POST-OPERATIVE DISCHARGE INSTRUCTIONS:    ACTIVITY:    Non-Weightbearing RLE in a Short Leg Plaster Splint     You are advised to go home directly from the hospital or surgical center. Restrict your activities.    Return to school/work will be determined at next clinic visit, unless previously discussed with the surgical team     BLOOD CLOT PREVENTION:    To prevent blood clot formation, you have been prescribed  40 mg enoxaparin BID  to be taken as prescribed until your surgeon or his Physician's Assistant (PA) states you can stop taking it.    Moving around and walking (with crutches) helps decrease the risk of blood clots. You must get up and \"move around\" once every hour, unless your are sleeping.    While you are sleeping and when you are not being active, continue to keep your leg elevated as much as possible.    It is common to have swelling in your feet after surgery for even a year afterwards, so the more you keep the leg elevated after surgery, the less swelling you will have later on.     GENERAL INSTRUCTIONS:  1.  Keep your surgical site elevated above your heart for at least 7 days or longer to prevent swelling (a good way to remember this is \"toes above nose\"). This will improve your comfort and your overall recovery following surgery.  Avoid pressure under the heel and keep the heel clear to avoid ulceration     2. Be alert for signs of infection including redness, streaking, odor, fever or chills. Be alert for excessive pain or bleeding and notify your surgeon immediately. Also, notify your surgeon of nausea, vomiting, or chills, numbness or weakness, bleeding, redness, swelling, pain, or drainage from surgical incision(s), bowel or bladder dysfunction, severe pain not relieved by pain medications, temperature greater than 101.0 F (38.3 C) degrees.    3. If you smoke (or have smoked within the last year), we strongly recommend that you do not smoke. This may lead to increased risk of wound infection " and will decrease your chances of healing (bone, soft tissue, etc).     WOUND INSTRUCTIONS:    Leave your Short Leg Plaster Splint until your post operative visit.  Keep it clean and dry.     Always keep the incision clean and dry until the staples/sutures are removed. If there is no drainage from the incision you should keep it open to air. If there is drainage from the incision you must keep it covered at all times until the drainage stops.    You may shower carefully (avoid falling) but the incision must be covered with Tegaderm or a water proof dressing so the wound does not get wet; once the staples/sutures are removed, the Tegaderm is no longer necessary and the wound may be washed with soap and water.     If you do not have sutures that need to be removed  then the incision should remain clean and dry for a minimum of 14 days.    Do not soak in a bath tub, hot tub, pool, lake or other body of water until atleast 21 days after your surgery and your incision is completely dry and healed. Or until approved by Dr. Corcoran.    If you have removable sutures (or staples) they will be removed in approximately 14-21 days (unless otherwise instructed) from the day of your surgery.     If you have a fiberglass cast or splint in place, please consider the following instructions:  1)  Elevate the extremity as much as possible.  2)  Keep the cast or splint clean and dry.  3)  Please call us if the cast becomes wet or dirty.  4)  If you are experiencing worsening pain or worsening swelling, please call.  5)  Itching can be bothersome.  You can try:  - Scratching the opposite limb in the same spot.  - Running air through the cast or splint with a blow dryer on cold.  - Do NOT stick anything inside the cast as it may become lodged    DIET:    Return to your regular diet as tolerated. Begin with light or bland foods. Drink plenty of fluids.      MEDICATIONS:    Resume all previous home medications at the previous prescribed  "dose and frequency unless otherwise noted    PAIN CONTROL:     For pain control, you have been prescribed medications.    Narcotic medication in the form of oxycodone. Take as prescribed and wean as able. Do not take with medications that are sedating, especially benzodiazepines.  You may take Ibuprofen (800mg every 6hrs) and Tylenol (500mg every 6hrs) for the first three days only.   - Alternate/stagger these two medications so that you're not taking them both at the same time. Take as prescribed for the first three days then stop when prescriptions done. This will provide a strong anti-inflammatory effect for the first few days.  3. The anesthesia block will wear off eventually. Some patients it will last only a few hours after surgery and in others it may last a few days. As soon as you start feeling any sensation at all in your leg, start taking the medications, so that you're not caught playing \"catch up\".   4. Remember to keep elevated. Avoid using ice while your extremity nerve block is still working. If you can't feel your leg and it is still numb, then the ice may induce injury. Ice behind the knee can help as well. You may apply ice to your cast/splint/dressing for the first few days. 20 minutes of icing followed by 2 hours of no ice. Keep the splint/cast/dressing dry and place a towel between the ice and the dressing. DO NOT GET YOUR DRESSING/SPLINT/CAST WET!  5. While taking narcotics, you may have constipation.  A stool softener is recommended. You may use an over-the-counter stool softener or use the one that has been prescribed for you. Colace and Senokot are common over-the-counter medications.  6. Do not drink alcohol or drive while using narcotic pain medication.  7. Do NOT take additional tylenol if your pain medication already has tylenol in it     IMPORTANT INFORMATION:    Please call your surgeon' s clinic with questions Monday-Friday during business hours. If no one answers, please leave a " message and someone should get back to the patient within 24 hours.  For after-hours calls, please call the  and request to contact the answering service for Dr. Corcoran.   For emergencies, call 911 or present to the nearest ER for immediate evaluation.     FOLLOWUP INSTRUCTIONS:    Please contact [phone] to confirm scheduled follow-up appointment or to make changes to your scheduled appointment.

## 2023-11-13 NOTE — ANESTHESIA PROCEDURE NOTES
Airway  Date/Time: 11/13/2023 7:47 AM  Urgency: elective      Staffing  Performed: GELY   Authorized by: Raghavendra Wolfe MD    Performed by: SOURAV Ruffin  Patient location during procedure: OR    Indications and Patient Condition  Indications for airway management: anesthesia  Spontaneous ventilation: present  Preoxygenated: yes  Patient position: sniffing  Mask difficulty assessment: 1 - vent by mask    Final Airway Details  Final airway type: endotracheal airway      Successful airway: ETT  Cuffed: yes   Successful intubation technique: direct laryngoscopy  Facilitating devices/methods: intubating stylet  Endotracheal tube insertion site: oral  Blade: Natalia  Blade size: #3  ETT size (mm): 7.0  Cormack-Lehane Classification: grade IIa - partial view of glottis  Placement verified by: capnometry   Measured from: gums  ETT to gums (cm): 22

## 2023-11-13 NOTE — ANESTHESIA PROCEDURE NOTES
Peripheral Block    Patient location during procedure: pre-op  Start time: 11/13/2023 7:10 AM  End time: 11/13/2023 7:15 AM  Reason for block: at surgeon's request and post-op pain management  Staffing  Performed: attending   Authorized by: Raghavendra Wolfe MD    Performed by: Raghavendra Wolfe MD  Preanesthetic Checklist  Completed: patient identified, IV checked, site marked, risks and benefits discussed, surgical consent, monitors and equipment checked, pre-op evaluation and timeout performed   Timeout performed at:   Peripheral Block  Patient position: laying flat  Prep: ChloraPrep and site prepped and draped  Patient monitoring: continuous pulse ox, heart rate and cardiac monitor  Block type: adductor canal  Injection technique: single-shot  Guidance: ultrasound guided  Local infiltration: lidocaine  Infiltration strength: 1 %  Dose: 3 mL  Needle  Needle type: short-bevel   Needle gauge: 22 G  Needle length: 8 cm  Needle localization: ultrasound guidance (Image saved in chart)  Test dose: negative  Assessment  Injection assessment: negative aspiration for heme, local visualized surrounding nerve on ultrasound, no paresthesia on injection and incremental injection  Heart rate change: no  Slow fractionated injection: yes  Additional Notes  Procedure related and patient specific complications discussed with patient after focussed neurological history.   Anticoagulation (if any) held per B[facility]LEY guidelines.  Sedation with Midazolam 4 mg I.v.  Aseptic prep and drape. .. No resistance to injection or high injection pressures based on tactile feedback. Meaningful verbal communication maintained throughout procedure. Patient tolerated procedure well with no immediate complications. Ultrasound image saved to chart. Written instructions with precautions about how to take care of insensate extremity will be provided by PACU.

## 2023-11-13 NOTE — BRIEF OP NOTE
Date: 2023  OR Location: [facility] OR    Name: Erick Becerril, : 1995, Age: 28 y.o., MRN: [id number], Sex: female    Diagnosis  Pre-op Diagnosis     * Other instability, right ankle [M25.371]     * Sprain of anterior talofibular ligament of right ankle, subsequent encounter [S93.491D]     * Ankle impingement syndrome, right [M25.871] Post-op Diagnosis     * Other instability, right ankle [M25.371]     * Sprain of anterior talofibular ligament of right ankle, subsequent encounter [S93.491D]     * Ankle impingement syndrome, right [M25.871]     Procedures  Right Ankle Arthroscopy; extensive debridement  58808 - [place] ARTHROSCOPY ANKLE SURGICAL DEBRIDEMENT EXTENSIVE    Ligament Repair; medial and lateral  17518 - [place] REPAIR SECONDARY DISRUPTED LIGAMENT ANKLE COLTRL      Surgeons      * Samuel Corcoran - Primary    Resident/Fellow/Other Assistant:  Surgeon(s) and Role:    Procedure Summary  Anesthesia: Consult  ASA: III  Anesthesia Staff: Anesthesiologist: Raghavendra Wolfe MD  C-AA: SOURAV Ruffin; SOURAV Trinidad; SOURAV Rivera  Estimated Blood Loss: 30 mL  Intra-op Medications:   Medication Name Total Dose   sodium chloride 0.9 % irrigation solution 1,000 mL              Anesthesia Record               Intraprocedure I/O Totals          Intake    LR 1300.00 mL    Propofol Drip 0.00 mL    The total shown is the total volume documented since Anesthesia Start was filed.    Total Intake 1300 mL       Output    Est. Blood Loss 30 mL    Total Output 30 mL       Net    Net Volume 1270 mL          Specimen: No specimens collected     Staff:   Circulator: Gris Carpio RN; Damian Chahal RN  Scrub Person: Lizbeth Bello          Findings: consistent with diagnosis, see operative report    Complications:  None; patient tolerated the procedure well.     Disposition: PACU - hemodynamically stable.  Condition: stable  Specimens Collected: No specimens collected  Attending Attestation: I was  present and scrubbed for the entire procedure.    Samuel Corcoran MD, ANNIA  Department of Orthopaedic Surgery  [facility]

## 2023-11-14 ENCOUNTER — OFFICE VISIT (OUTPATIENT)
Dept: BEHAVIORAL HEALTH | Facility: CLINIC | Age: 28
End: 2023-11-14
Payer: COMMERCIAL

## 2023-11-14 DIAGNOSIS — F54 PSYCHOLOGICAL FACTOR AFFECTING PHYSICAL CONDITION: ICD-10-CM

## 2023-11-14 DIAGNOSIS — E66.01 MORBID OBESITY (MULTI): ICD-10-CM

## 2023-11-14 LAB — VIT B1 PYROPHOSHATE BLD-SCNC: 127 NMOL/L (ref 70–180)

## 2023-11-14 PROCEDURE — 1036F TOBACCO NON-USER: CPT | Performed by: PSYCHOLOGIST

## 2023-11-14 PROCEDURE — 90791 PSYCH DIAGNOSTIC EVALUATION: CPT | Performed by: PSYCHOLOGIST

## 2023-11-14 PROCEDURE — 3008F BODY MASS INDEX DOCD: CPT | Performed by: PSYCHOLOGIST

## 2023-11-14 NOTE — LETTER
November 14, 2023     Nacho Salmon MD  [id number] [facility]  Specialty Clinic  [facility] [address]    Patient: Erick Obrien   YOB: 1995   Date of Visit: 11/14/2023       Dear Dr. Nacho Salmon MD:    Thank you for referring Erick Obrien to me for evaluation. Below are my notes for this consultation.  If you have questions, please do not hesitate to call me. I look forward to following your patient along with you.       Sincerely,     Milagro Barrera PsyD      CC: No Recipients  ______________________________________________________________________________________    Start time: 1:00p  End time: 1:40p    Televideo Informed Consent for psychological evaluation was reviewed with the patient as follows:  There are potential benefits and risks of the use of telephone or video-conferencing that differ from in-person sessions. Specifically, the telephone or televideo system we are using may not be HIPAA compliant and may present limits to patient confidentiality. Confidentiality still applies for telepsychology services, and nobody will record the session without your permission.     Understanding and verbal agreement was attested to by the patient. Patient identity was confirmed using 3 sources, including telephone number, email address and date of birth. Provision of services via telehealth was necessitated by the restrictions on face-to-face visits accompanying the COVID-19 pandemic.    Non-secure Note: The patient has consented to a nonrestricted note.    I had the pleasure of seeing ERICK OBRIEN at your request for behavioral evaluation for appropriateness for bariatric surgery. As you know, MS. OBRIEN is a 28 year old who reports a current weight of 263 pounds and a BMI of approximately 47.    MS. OBRIEN reported that she has completed her sleep study and passed without issues. She noted that she completed her lab work and her cardiology appointment is scheduled.     WEIGHT HISTORY  MS. OBRIEN reported that weight  "did not become an issue for her until she was pregnant for the first time at age 17. Prior to this she weighed around 145-160 lbs. After pregnancy she weighed around 186 lbs. During pregnancy she was working at [employer] and eating fast food more frequently. Following birth of her child she did not lose any of the weight even with diet and exercise. Two years later she became pregnant with her second child (weighed 186 lbs) and gained more weight (she reported that she was then over 200 lbs and never lost this weight).     MS. OBRIEN reported that lifestyle impacted her food intake. While working at a [employer] she was eating more fast food. 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     She stated that she has worked with a dietitian in the past and together they created a meal and exercise plan for her. This worked for a little while but she reported that she could not lose more than 20 lbs on this plan. She noted that she has tried multiple diets but has had minimal success. She stated that she decided to have surgery to have more control over her health. She stated that she hurt her ankle in the summer and has not been able to be as active.     MS. OBRIEN stated that she plans to meet with her dietitian once per month and has had one meeting. She stated that she has eliminated carbonated beverages for the last two months and is drinking only water and tea. She is working on cutting back on fast food and is meal prepping. She stated that she is practicing 30-30-30.     Daily food intake:  Breakfast: bagel with butter  Lunch: Powerbowl (rice, chicken, cheese)  Dinner: Chicken, steak, pork chops; vegetables (green beans, carrots)    She stated that she eats 3 meals per day and reported that she is not a big \"snacker\"; she noted that she eats fruit salad when she is wanting a snack    PSYCHOSOCIAL HISTORY  MS. OBRIEN lives at home with her four children, one step-child " and her partner. She stated that her and her partner share cooking responsibilities and he is very supportive of her decision to weight loss surgery. She described her partner as her main support.     Exercise: Prior to her ankle injury she walked often with children, hiked, kayaked, went to the gym 1-2x per week, coached cheer. Since her injury she can only go on light walks (30 min) due to pain; she had surgery on 11/13 and will possibly start PT     She denies alcohol use (maybe 1x per month); she denies use of cannabis and other substances; She denies use of tobacco/nicotine products    PSYCHOLOGICAL STATUS  MS. OBRIEN stated that she struggled with anxiety in the past. She reported that it was very situational related to a past relationship. She noted that once she left with relationship her anxiety diminished significantly. She stated that she was on fluoxetine 20 mg for 6 mos in early 2023. She stated that she stopped taking this medication in June due to change in situation and decrease in symptoms.     She noted that she started experiencing anxiety some time around early 2022. She noticed she was having occasional panic attacks (racing heart, inability to calm). She experienced significant worry that led to GI upset. She reported that she has not had anxiety since the summer of 2023 and has not had any symptoms since coming off of her medication.     She denies history of depression and other mental illness. She reported that she has never been in therapy. She reported that she manages stress by cleaning and talking to her family and friends. She described having different outlets for different stressors. She stated that in order to calm she takes baths and reads. She stated that she likes to go thrifting for fun.     She stated that she rarely engages in emotional eating. She noted that occasionally if she is feeling sad she may eat ice cream but she stated that she does not overdo it.     She denies  disordered eating behaviors.     Behavioral issues relevant for candidacy for bariatric surgery include:    1) Motivation: MS. OBRIEN is motivated by a desire to improve her health in order to have better control of her activity levels and overall health.     2) History of compliance: MS. OBRIEN reports no history of problems with compliance with medication regimens.     3) History of attempts to lose weight: MS. OBRIEN has tried and failed at more conservative approaches to weight loss. She has attempted the omni diet with her mother, worked with dietitians on exercise and meal planning; various other diets.     4) Coping resources and social support: MS. OBRIEN reports significant support from family in her pursuit of bariatric surgery. She described adequate coping for stress and mood management. She stated that she utilizes the support of the family and friends, reads, takes baths for relaxation and spends time with her children.     5) Ability to maintain behavior post-surgery: In my opinion, MS. OBRIEN is prepared to handle the behavioral demands that are consequent to bariatric surgery. She stated that over the last two months she has eliminated carbonated beverages, has increased her protein intake, is meal prepping and practicing the 30-30-30. She reported history of an active lifestyle, which has diminished somewhat recently due to ankle injury. However, she is attempting to talk daily.     6) Psychological contraindications to surgery: A comprehensive review of psychological symptoms reveals no contraindications to surgery. She discussed past experience of anxiety and demonstrated ability to manage this anxiety by seeking help.     IMPRESSIONS    MS. OBRIEN is able to provide informed consent and is an appropriate candidate for bariatric surgery from the psychological perspective.     Behavioral confirmation of her candidacy will come in the form of her ability to adhere to her current dietary plan. Should she fare poorly  with this adherence trial, please consider recommending a follow-up visit with this office.     Additionally, we had an extended discussion about behavioral responses to surgery for which she should be vigilant. We discussed the post-surgical risks of mood deterioration, substance misuse, the development of compulsive behaviors and the development of maladaptive coping responses. She expressed understanding of these risks and agreed to contact our office with appropriate haste if any of these maladaptive responses were to develop after surgery.    Thank you for allowing me to collaborate in the evaluation of your patient. Please feel free to contact me if I can provide any additional information.      Mental Status Examination:    Mental Status Exam:  Orientation:  Alert. Oriented x3.  Memory: intact.  Attention/Concentration: Normal/ Good.  Appearance:  Well-groomed. Casually Dressed. Good hygiene.   Behavior/Attitude: Cooperative. Pleasant. Good eye contact.  Motor: Relaxed. Calm. Normal motor activity.   Speech: Regular rate and volume. Fluent. No pressure.   Mood: Euthymic  Affect: Congruent to stated mood.   Thought process: Goal-directed. Organized.  Thought content: No paranoia, delusion or ideas of reference. No AVH   Suicidal ideation: denied.  Homicidal ideation: denied.   Insight: Good  Judgment: Good  Fund of knowledge: Above Average      Milagro Barrera Psy.D.  Pronouns - she  her  hers  Licensed Clinical Psychologist    [facility] [place]   [facility]  Phone: [phone]  [email or web address]

## 2023-11-14 NOTE — PROGRESS NOTES
Start time: 1:00p  End time: 1:40p    Televideo Informed Consent for psychological evaluation was reviewed with the patient as follows:  There are potential benefits and risks of the use of telephone or video-conferencing that differ from in-person sessions. Specifically, the telephone or televideo system we are using may not be HIPAA compliant and may present limits to patient confidentiality. Confidentiality still applies for telepsychology services, and nobody will record the session without your permission.     Understanding and verbal agreement was attested to by the patient. Patient identity was confirmed using 3 sources, including telephone number, email address and date of birth. Provision of services via telehealth was necessitated by the restrictions on face-to-face visits accompanying the COVID-19 pandemic.    Non-secure Note: The patient has consented to a nonrestricted note.    I had the pleasure of seeing RAZIA OBRIEN at your request for behavioral evaluation for appropriateness for bariatric surgery. As you know, MS. OBRIEN is a 28 year old who reports a current weight of 263 pounds and a BMI of approximately 47.    MS. OBRIEN reported that she has completed her sleep study and passed without issues. She noted that she completed her lab work and her cardiology appointment is scheduled.     WEIGHT HISTORY  MS. OBRIEN reported that weight did not become an issue for her until she was pregnant for the first time at age 17. Prior to this she weighed around 145-160 lbs. After pregnancy she weighed around 186 lbs. During pregnancy she was working at [employer] and eating fast food more frequently. Following birth of her child she did not lose any of the weight even with diet and exercise. Two years later she became pregnant with her second child (weighed 186 lbs) and gained more weight (she reported that she was then over 200 lbs and never lost this weight).     MS. OBRIEN reported that lifestyle impacted her food intake.  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     She stated that she has worked with a dietitian in the past and together they created a meal and exercise plan for her. This worked for a little while but she reported that she could not lose more than 20 lbs on this plan. She noted that she has tried multiple diets but has had minimal success. She stated that she decided to have surgery to have more control over her health. She stated that she hurt her ankle in the summer and has not been able to be as active.     MS. OBRIEN stated that she plans to meet with her dietitian once per month and has had one meeting. She stated that she has eliminated carbonated beverages for the last two months and is drinking only water and tea. She is working on cutting back on fast food and is meal prepping. She stated that she is practicing 30-30-30.     Daily food intake:  Breakfast: bagel with butter  Lunch: Powerbowl (rice, chicken, cheese)  Dinner: Chicken, steak, pork chops; vegetables (green beans, carrots)    She stated that she eats 3 meals per day and reported that she is not a big \"snacker\"; she noted that she eats fruit salad when she is wanting a snack    PSYCHOSOCIAL HISTORY  MS. OBRIEN lives at home with her four children, one step-child and her partner. She stated that her and her partner share cooking responsibilities and he is very supportive of her decision to weight loss surgery. She described her partner as her main support.     Exercise: Prior to her ankle injury she walked often with children, hiked, kayaked, went to the gym 1-2x per week, coached cheer. Since her injury she can only go on light walks (30 min) due to pain; she had surgery on 11/13 and will possibly start PT     She denies alcohol use (maybe 1x per month); she denies use of cannabis and other substances; She denies use of tobacco/nicotine " products    PSYCHOLOGICAL STATUS  MS. OBRIEN stated that she struggled with anxiety in the past. She reported that it was very situational related to a past relationship. She noted that once she left with relationship her anxiety diminished significantly. She stated that she was on fluoxetine 20 mg for 6 mos in early 2023. She stated that she stopped taking this medication in June due to change in situation and decrease in symptoms.     She noted that she started experiencing anxiety some time around early 2022. She noticed she was having occasional panic attacks (racing heart, inability to calm). She experienced significant worry that led to GI upset. She reported that she has not had anxiety since the summer of 2023 and has not had any symptoms since coming off of her medication.     She denies history of depression and other mental illness. She reported that she has never been in therapy. She reported that she manages stress by cleaning and talking to her family and friends. She described having different outlets for different stressors. She stated that in order to calm she takes baths and reads. She stated that she likes to go thrifting for fun.     She stated that she rarely engages in emotional eating. She noted that occasionally if she is feeling sad she may eat ice cream but she stated that she does not overdo it.     She denies disordered eating behaviors.     Behavioral issues relevant for candidacy for bariatric surgery include:    1) Motivation: MS. OBRIEN is motivated by a desire to improve her health in order to have better control of her activity levels and overall health.     2) History of compliance: MS. OBRIEN reports no history of problems with compliance with medication regimens.     3) History of attempts to lose weight: MS. OBRIEN has tried and failed at more conservative approaches to weight loss. She has attempted the omni diet with her mother, worked with dietitians on exercise and meal planning;  various other diets.     4) Coping resources and social support: MS. OBRIEN reports significant support from family in her pursuit of bariatric surgery. She described adequate coping for stress and mood management. She stated that she utilizes the support of the family and friends, reads, takes baths for relaxation and spends time with her children.     5) Ability to maintain behavior post-surgery: In my opinion, MS. OBRIEN is prepared to handle the behavioral demands that are consequent to bariatric surgery. She stated that over the last two months she has eliminated carbonated beverages, has increased her protein intake, is meal prepping and practicing the 30-30-30. She reported history of an active lifestyle, which has diminished somewhat recently due to ankle injury. However, she is attempting to talk daily.     6) Psychological contraindications to surgery: A comprehensive review of psychological symptoms reveals no contraindications to surgery. She discussed past experience of anxiety and demonstrated ability to manage this anxiety by seeking help.     IMPRESSIONS    MS. OBRIEN is able to provide informed consent and is an appropriate candidate for bariatric surgery from the psychological perspective.     Behavioral confirmation of her candidacy will come in the form of her ability to adhere to her current dietary plan. Should she fare poorly with this adherence trial, please consider recommending a follow-up visit with this office.     Additionally, we had an extended discussion about behavioral responses to surgery for which she should be vigilant. We discussed the post-surgical risks of mood deterioration, substance misuse, the development of compulsive behaviors and the development of maladaptive coping responses. She expressed understanding of these risks and agreed to contact our office with appropriate haste if any of these maladaptive responses were to develop after surgery.    Thank you for allowing me to  collaborate in the evaluation of your patient. Please feel free to contact me if I can provide any additional information.      Mental Status Examination:    Mental Status Exam:  Orientation:  Alert. Oriented x3.  Memory: intact.  Attention/Concentration: Normal/ Good.  Appearance:  Well-groomed. Casually Dressed. Good hygiene.   Behavior/Attitude: Cooperative. Pleasant. Good eye contact.  Motor: Relaxed. Calm. Normal motor activity.   Speech: Regular rate and volume. Fluent. No pressure.   Mood: Euthymic  Affect: Congruent to stated mood.   Thought process: Goal-directed. Organized.  Thought content: No paranoia, delusion or ideas of reference. No AVH   Suicidal ideation: denied.  Homicidal ideation: denied.   Insight: Good  Judgment: Good  Fund of knowledge: Above Average      Milagro Barrera Psy.D.  Pronouns - she  her  hers  Licensed Clinical Psychologist    [facility] [place]   [facility]  Phone: [phone]  [email or web address]

## 2023-11-14 NOTE — OP NOTE
Right Ankle Arthroscopy; extensive debridement (R), Ligament Repair; medial and lateral (R) Operative Note     Date: 2023  OR Location: [facility] A OR    Name: Erick Becerril, : 1995, Age: 28 y.o., MRN: [id number], Sex: female    Diagnosis  Pre-op Diagnosis     * Other instability, right ankle [M25.371]     * Sprain of anterior talofibular ligament of right ankle, subsequent encounter [S93.491D]     * Ankle impingement syndrome, right [M25.871] Post-op Diagnosis     * Other instability, right ankle [M25.371]     * Sprain of anterior talofibular ligament of right ankle, subsequent encounter [S93.491D]     * Ankle impingement syndrome, right [M25.871]     * Loose body in ankle or foot joint, right [M24.071, M24.074]     Procedures  Right Ankle Arthroscopy; extensive debridement  74806 - SC ARTHROSCOPY ANKLE SURGICAL DEBRIDEMENT EXTENSIVE    Ligament Repair; medial and lateral  71546 - SC REPAIR SECONDARY DISRUPTED LIGAMENT ANKLE COLTRL    SC ARTHRT ANKLE W/EXPL W/WO BX W/WO RMVL LOOSE/FB [90509]  Surgeons      * Samuel Corcoran - Primary    Resident/Fellow/Other Assistant:  Surgeon(s) and Role:    Procedure Summary  Anesthesia: Consult  ASA: III  Anesthesia Staff: Anesthesiologist: Raghavendra Wolfe MD  C-AA: SOURAV Ruffin; SOURAV Trinidad; SOURAV Rivera  Estimated Blood Loss: 30 mL  Intra-op Medications:   Medication Name Total Dose   sodium chloride 0.9 % irrigation solution 1,000 mL              Anesthesia Record               Intraprocedure I/O Totals          Intake    LR 1300.00 mL    Propofol Drip 0.00 mL    The total shown is the total volume documented since Anesthesia Start was filed.    Total Intake 1300 mL       Output    Est. Blood Loss 30 mL    Total Output 30 mL       Net    Net Volume 1270 mL          Specimen: No specimens collected     Staff:   Circulator: Gris Carpio RN; Damian Chahal RN  Scrub Person: Lizbeth Bello         Drains and/or Catheters: * None  in log *    Tourniquet Times:     Total Tourniquet Time Documented:  Thigh (Left) - 99 minutes  Thigh (Left) - 57 minutes  Total: Thigh (Left) - 156 minutes      Implants:  Implants       Type Name Action Serial No.      Implant SUTURE ANCHOR, DX FIBERTAK P1 ST & NDLS - SN/A - [id number] Implanted N/A     Implant SUTURE ANCHOR, DX FIBERTAK P1 ST & NDLS - SN/A - [id number] Implanted N/A     Implant SUTURE ANCHOR, DX FIBERTAK P1 ST & NDLS - SN/A - [id number] Implanted N/A     Implant SUTURE ANCHOR, DX FIBERTAK P1 ST & NDLS - SN/A - [id number] Implanted N/A     Implant SUTURE ANCHOR, DX FIBERTAK P1 ST & NDLS - SN/A - [id number] Implanted N/A     Implant SUTURE ANCHOR, DX FIBERTAK P1 ST & NDLS - SN/A - [id number] Implanted N/A              Findings: consistent with diagnosis, see operative repport    Patient Name: Erick Becerril  MRN: [id number]  YOB: 1995  Date of Service: 11/13/23  Report Type: Operative    PREOPERATIVE DIAGNOSIS:      POSTOPERATIVE DIAGNOSIS:      OPERATION/PROCEDURE:    Right Open Lateral Ankle Ligament Reconstruction  Right Open Medial Ankle Ligament Reconstruction  Right Ankle Arthroscopy with Extensive Debridement  Right Ankle Arthrotomy and Open Removal of Loose Body    SURGEON:  Samuel Corcoran MD    ASSISTANT(S):     ANESTHESIA:  General    ESTIMATED BLOOD LOSS: 30 cc    COMPLICATIONS: None apparent    DISPOSITION: PACU/HOME    BRIEF CLINICAL HISTORY: 28-year-old female well-known to me at this time who initially sustained an injury to her right ankle from 05/29/2023.  Patient was hiking and had a misstep.  Patient had a difficult course following injury.  She trialed physical therapy, bracing as well as a corticosteroid injection without significant relief.  Pain was reported when going downstairs and she also had mechanical symptoms including catching and the sensation that the ankle was giving out.  On my examination the patient had pain overlying the medial ankle about the  superficial and deep deltoid with a positive posterior drawer and dorsiflexion and external rotation stress test.  X-ray imaging demonstrated a well-corticated loose body distal to the fibula.  An MRI was obtained that demonstrated attenuation of the ATFL, CFL as well as the deltoid.  I had a long discussion with the patient and given her failure of conservative management, I recommended she undergo a right ankle arthroscopy and planned lateral ligament stabilization with possible medial ligament stabilization.  I discussed that she had some increased signal about her PTT but clinically this did not appear to be symptomatic and so I would favor treating her symptomatically and could trial offloading her PTT with orthotics down the road.  I previously reviewed the risks, benefits and alternatives to surgery. Risks include but are not limited to infection, wound healing complications, need for further surgery, persistent or worsening pain, postoperative stiffness, bleeding, blood loss requiring a blood transfusion, neurovascular injury, hardware failure, hardware pain, failure of the procedure, complications of anesthesia, stroke, DVT/PE, myocardial infarction and death. Benefits included decreased pain, improved function and increased ability. Alternatives included continued conservative management. The patient voiced understanding of the risks, benefits and alternatives and the informed consent was signed, with both myself and the patient present.    OPERATIVE REPORT:   On the day of surgery 11/13/2023, the patient was met in the preoperative holding area by members of the orthopedic, anesthesia and nursing teams.  I marked the patient's right lower extremity with indelible ink with the patient as my witness.  The informed consent was again reviewed.  All remaining questions were answered.  In the preoperative holding area, the anesthesia team performed a abductor and popliteal block. The patient had been previously  medically optimized for surgery by preadmission testing.    The patient was then brought back to the operating room on [facility].  I led a preoperative timeout, verifying the correct patient, procedure and laterality of procedure to be performed.  We confirmed the appropriate availability of all surgical equipment,  implants, utilization of fluoroscopy and confirmed the administration of pre-surgical IV antibiotic prophylaxis within 1 hour of incision time, 3 g Ancef.  All present were in agreement.    Care was handed over to the anesthesia team who provided GETA.  The patient was then transferred onto the operating room table in the supine position.  All bony prominences were padded and an SCD was placed on the contra-lateral extremity. A nonsterile, well-padded tourniquet was placed.  The patient's right lower extremity was then prepped and draped in usual sterile fashion with sterile prep with ChloraPrep.  Due to the patient's habitus, BMI=47 there was additional time required for patient positioning and preparation of the extremity both prior to starting the procedure and throughout the duration of the case requiring increased operative time and adding additional complexity beyond what is typical for a routine ankle arthroscopy and ligament stabilization procedure.    I then led a procedure pause again verifying the correct patient, procedure and laterality of procedure to be performed.  All present were in agreement.  The patient's right lower extremity was then exsanguinated with an Esmarch and the tourniquet was inflated to 275 mmHg.  Tourniquet was inflated initially for 99 minutes at which time we began our open procedure and 30 minutes elapsed prior to reinflation of the tourniquet for an additional 57 minutes.    I began by marking out the surface anatomy overlying the anterior ankle.  The anteromedial portal was marked out just medial to the tibialis anterior tendon.  Through this portal and with a  22-gauge needle I injected 10 cc of normal saline into the ankle joint to confirm portal placement with visualization of the distention of the anterolateral ankle capsule.  The anteromedial portal was established with a longitudinal incision through the skin with a nick and spread technique to the level of the joint capsule in order to protect the saphenous vein and nerve as well as the tibialis anterior tendon.  The blunt trocar was then utilized to gain access to the ankle joint with the small joint arthroscope.  The anterolateral portal was then established under direct arthroscopic visualization with the use of an additional 22-gauge needle.  A vertical incision was then made through skin only with careful dissection with a small curved hemostat with great care to avoid any branches of the superficial peroneal nerve.    Diagnostic arthroscopy was then completed.  There was extensive synovitis and adhesions predominantly involving the anterolateral ankle as well as the medial gutter.  There was no obvious bony impingement nor osteochondral lesions of the talus.  There was an obvious Melvern's ligament with anterior ankle impingement overlying the anterior lateral talus.  The loose body was visualized arthroscopically and densely encased within scar within the lateral gutter.  Additionally the medial malleolus appeared bare concerning for attenuation of the deltoid ligament and medial instability.  There was no obvious pathology of the central nor posterior ankle visualized arthroscopically.    I then turned my attention to performing extensive arthroscopic debridement.  The Melvern's ligament and anterior lateral as well as anterior fibrous adhesions and hypertrophic synovium were readily debrided arthroscopically with a small joint shaver.  Additionally, the anterior medial synovium and medial gutter adhesions were debrided arthroscopically with a small joint shaver by visualizing the medial ankle through the  lateral arthroscopic portal.  Distally in the lateral gutter, the loose body was mobilized with use of small joint shaver and arthroscopic cautery but not readily amenable to arthroscopic removal.     This completed the arthroscopic portion of the procedure.  Any remaining debris was removed from the joint with a shaver.  The joint fluid was evacuated and the arthroscopic instruments were removed.  The arthroscopic portals were closed with nylon sutures in a simple interrupted fashion.  The leg was taken out of the well leg westfall and a new sterile drape was placed over the arthroscopic drapes and the open portion of the procedure then ensued.  By this time the tourniquet had been let down.  There was excellent reperfusion of the extremity noted.    I marked out a standard lateral approach to the distal fibula.  Incision was made through skin and subcutaneous tissue.  Dissection was deepened with the use of tenotomy scissors with care to identify and protect any branches of the superficial peroneal as well as sural nerves.  The inferior extensor retinaculum was identified and mobilized.    The lateral ankle capsule ligamentous complex including ATFL and CFL were then elevated from the distal fibula as a single capsuloligamentous sleeve which also served as a lateral arthrotomy to the ankle.  The talus was visualized and irrigated copiously.  A small periosteal flap of the fibula was elevated laterally.  I then turned my attention to the previously visualized loose body distal to the fibula which was sharply excised from the ankle with use of a #15 blade scalpel and hemostat.  AP and lateral radiographs confirmed complete excision of the loose body.    I then returned my attention to completing the lateral ankle ligament reconstruction.  A rongeur/curette was utilized to create a trough of cancellous bone to receive the anchors.  2 Arthrex fiber tack anchors were then drilled and positioned in the distal fibula at  the anatomic footprint for the ATFL and CFL.  The anchors were individually tested to ensure stability.  An additional fiber tack anchor was then drilled between the anatomic footprint for the ATFL and CFL for additional fixation.  With the ankle held in neutral dorsiflexion, eversion and with a relative posterior drawer, the sutures were then passed through the elevated capsuloligamentous sleeve and tied in a horizontal mattress fashion to advance the capsuloligamentous sleeve back to the cancellous bone of the distal fibula, effectively re-tensioning the ligaments.  The sutures were then repassed through the laterally elevated periosteal sleeve and tied again for additional stability. IER was then advanced over this repair which was then oversewn with absorbable suture.  Excellent stability was noted.    I then turned my attention to the medial ankle.  An incision was marked out overlying the anteromedial ankle.  I incised the skin and subcutaneous tissue.  The saphenous vein and nerve were identified, mobilized and protected throughout the duration of the case.  The deltoid ligament complex was obviously attenuated on inspection and then sharply elevated from the distal aspect of the medial malleolus.  The talus was visualized.  The joint was copiously irrigated.  A rongeur and curette were utilized to create a trough of cancellous bone to receive anchor fixation.  2 fiber tack anchors were then drilled and positioned in the distal of medial malleolus to retension the deltoid ligament complex.  The anchors were individually tested to ensure stability.  With ankle maintained in neutral dorsiflexion so as not to effect the lateral repair, the sutures were then passed through the capsuloligamentous sleeve and tied in a horizontal mattress fashion to advance the capsuloligamentous sleeve back to the cancellous bone of the medial malleolus, again effectively retensioning the ligaments.  The sutures were then repassed  through the medial to the periosteal sleeve and tied for additional stability.  The repair was then over-sewn with absorbable suture. Excellent stability was noted.    The wounds were copiously irrigated and closed in layers. 0 PDS in a figure-of-eight interrupted fashion was used for the deep fascial layer with 3-0 Monocryl in a buried interrupted fashion and 3-0 nylon in a vertical mattress and simple interrupted fashion for skin.  The skin was cleansed and dried and dry sterile dressings were placed.  The sterile drapes were removed. The patient was then placed into a well-padded short leg splint. By this time, the tourniquet had been released and there was excellent reperfusion of the extremity with palpable DP/PT pulses.    All surgical counts were noted to be correct. The patient was then awoken from anesthesia without complication and transferred from the operating room table onto the [facility] and then brought back to the PACU in stable condition.    Post-Operative Plan:   The patient will remain nonweightbearing in their right lower extremity in a short leg splint.  They will begin Lovenox for DVT prophylaxis.  She was prescribed oxycodone for severe postoperative pain after an appropriate OARRS report was reviewed verifying no concerning findings for abuse.  I have encouraged early mobilization and extremity elevation as tolerated.  I will plan to see the patient back in approximately 1 week for their first postoperative visit for a wound check out of plaster.  Upon return, patient would not require further imaging.    Complications:  None; patient tolerated the procedure well.    Disposition: PACU - hemodynamically stable.  Condition: stable     Attending Attestation: I was present and scrubbed for the entire procedure.    Samuel Corcoran MD, ANNIA  Department of Orthopaedic Surgery  [facility]    Note dictated with [employer] software.   Completed without full type editing and sent to avoid delay.

## 2023-11-20 PROBLEM — E66.01 MORBID OBESITY (MULTI): Status: RESOLVED | Noted: 2022-04-18 | Resolved: 2023-11-20

## 2023-11-20 PROBLEM — B96.89 BACTERIAL VAGINOSIS: Status: RESOLVED | Noted: 2023-09-02 | Resolved: 2023-11-20

## 2023-11-20 PROBLEM — R63.5 WEIGHT GAIN: Status: RESOLVED | Noted: 2023-09-02 | Resolved: 2023-11-20

## 2023-11-20 PROBLEM — N76.0 BACTERIAL VAGINOSIS: Status: RESOLVED | Noted: 2023-09-02 | Resolved: 2023-11-20

## 2023-11-22 ENCOUNTER — APPOINTMENT (OUTPATIENT)
Dept: CARDIOLOGY | Facility: CLINIC | Age: 28
End: 2023-11-22
Payer: COMMERCIAL

## 2023-11-22 ENCOUNTER — OFFICE VISIT (OUTPATIENT)
Dept: ORTHOPEDIC SURGERY | Facility: CLINIC | Age: 28
End: 2023-11-22
Payer: COMMERCIAL

## 2023-11-22 ENCOUNTER — TELEPHONE (OUTPATIENT)
Dept: SURGERY | Facility: CLINIC | Age: 28
End: 2023-11-22

## 2023-11-22 DIAGNOSIS — M24.071 LOOSE BODY IN ANKLE AND FOOT JOINT, RIGHT: ICD-10-CM

## 2023-11-22 DIAGNOSIS — M24.074 LOOSE BODY IN ANKLE AND FOOT JOINT, RIGHT: ICD-10-CM

## 2023-11-22 DIAGNOSIS — M25.871 ANKLE IMPINGEMENT SYNDROME, RIGHT: ICD-10-CM

## 2023-11-22 DIAGNOSIS — S93.421D SPRAIN OF DELTOID LIGAMENT OF RIGHT ANKLE, SUBSEQUENT ENCOUNTER: ICD-10-CM

## 2023-11-22 DIAGNOSIS — S93.491D SPRAIN OF ANTERIOR TALOFIBULAR LIGAMENT OF RIGHT ANKLE, SUBSEQUENT ENCOUNTER: Primary | ICD-10-CM

## 2023-11-22 PROCEDURE — 1036F TOBACCO NON-USER: CPT | Performed by: STUDENT IN AN ORGANIZED HEALTH CARE EDUCATION/TRAINING PROGRAM

## 2023-11-22 PROCEDURE — 3008F BODY MASS INDEX DOCD: CPT | Performed by: STUDENT IN AN ORGANIZED HEALTH CARE EDUCATION/TRAINING PROGRAM

## 2023-11-22 PROCEDURE — 99024 POSTOP FOLLOW-UP VISIT: CPT | Performed by: STUDENT IN AN ORGANIZED HEALTH CARE EDUCATION/TRAINING PROGRAM

## 2023-11-22 ASSESSMENT — PAIN - FUNCTIONAL ASSESSMENT: PAIN_FUNCTIONAL_ASSESSMENT: 0-10

## 2023-11-22 ASSESSMENT — PAIN DESCRIPTION - DESCRIPTORS: DESCRIPTORS: ACHING

## 2023-11-22 ASSESSMENT — PAIN SCALES - GENERAL: PAINLEVEL_OUTOF10: 4

## 2023-11-22 NOTE — LETTER
November 22, 2023     Patient: Erick Becerril   YOB: 1995   Date of Visit: 11/22/2023       To Whom It May Concern:    It is my medical opinion that Erick Becerril should remain out of work until 11/29/23 .    If you have any questions or concerns, please don't hesitate to call.         Sincerely,        Samuel Corcoran MD    CC: No Recipients

## 2023-11-22 NOTE — TELEPHONE ENCOUNTER
This RN called and spoke to patient in regards to clearances needed for bariatric surgery. Educated patient on clearances and patient verbalized understanding. Will place an updated letter in patient's chart.

## 2023-11-22 NOTE — LETTER
Melo Becerril,    Thank you for choosing [facility] to guide you on your journey to Bariatric Surgery and a healthier you!  We have reviewed your bariatric checklist and have found several items that require your attention.     To schedule an appointment with a specialist, call [address] [place] [address].   Lab work can be completed at any  facility near you; the orders are in the computer.    The following items remain for your bariatric surgery steps:     Cardiac Clearance (Please call [address] [place] [address] to schedule), Sleep Medicine Evaluation (Please call [phone] to schedule your sleep study, and [address] [place] [address] to schedule with sleep medicine for 2 weeks after your sleep study), EGD (or upper endoscopy: to schedule/reschedule, please contact your surgeon's office) , Nutrition Assessment and Clearance (to reschedule an appointment, please contact your dietician directly), and Primary Care letter.    Please direct any questions you might have to your nurse at Dr. Salmon's office.     Kindly,    The  Bariatric Surgery Team

## 2023-11-22 NOTE — PROGRESS NOTES
ORTHOPAEDIC SURGERY OUTPATIENT PROGRESS NOTE    Chief Complaint:  Right ankle pain    History Of Present Illness  28-year-old female presents for evaluation of right ankle pain in follow-up from urgent care. Patient reports an inversion type injury to her ankle on 05/29/2023. Patient was attempting to hike and a misstep. She reports no prior history of trauma that she can recall. She experienced severe pain and had difficulty bearing weight. She was in the woods at the time hiking and had a friend assist her to her vehicle as she was unable to bear weight. She is currently reporting 7 out of 10 pain. She been taking ibuprofen. She was seen in urgent care setting where x-rays were obtained and the patient was placed into a walking boot and made nonweightbearing with crutches. She denies any associated numbness, tingling or weakness. Patient works as a  and was sent home from work today until further evaluation.     Patient is a current non-smoker, nondiabetic and does not use any anticoagulation.     Please see detailed patient history sheet for further information.     06/20/2023: Patient returns in follow-up of her right ankle injury. Patient reports that she has been essentially nonweightbearing from her prior appointment. She has not been comfortable putting weight down has been using crutches for toileting. She denies any new trauma or associated numbness, tingling weakness. She has noted that her swelling has decreased significantly. She has not yet returned to work.     07/03/2023: Patient returns for follow-up of her right ankle injury. Patient has transitioned to weightbearing in regular shoes and is currently wearing sandals with ankle brace. She reports returning to work with some residual, 3 out of 10 pain. She has not formally started physical therapy but has been doing HEP at home. Pain is worse when she has a misstep with some associated swelling. She has been using compression socks. She is  leaving Friday for vacation.     08/11/2023: Patient returns for follow-up of her right ankle injury. She has been weightbearing as tolerated in regular shoes and continuing to use her ankle brace. She is reporting continued pain in the ankle that worsens with activity and is rated a 7 out of 10 particular with turning her ankle in. She has tried physical therapy that appears to have helped some but still has pain. She has returned to work and is not reporting any finn instability at this time.     09/27/2023: Patient returns for follow-up of her right ankle injury. She is continued in physical therapy with the use of a brace. Unfortunately, she continues to complain of intermittent moderate to severe, typically 7 out of 10 pain when going downstairs. Pain is predominantly on the inside of her ankle. She does report subjective symptoms with ankle giving out or giving way.    10/26/2023: Patient returns for follow-up of her right ankle injury.  She has continued with severe ankle pain, typically 7-8 out of 10. Pain is also noted when going downstairs.  She has a clicking and catching.  She does note the ankle gives out or gives way.  She typically feels better in the brace.    11/22/2023: Patient returns s/p R ankle arthroscopy with extensive debridement, open loose body removal and medial/lateral ligament reconstruction from 11/13/2023.  Patient reports 4 out of 10 pain.  She really has had no numbness, tingling or weakness.  She reports that her ankle feels improved from prior to surgery.     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    History of neoplasm in situ of cervix       Surgical History  Recent Surgeries in Orthopaedic Surgery            No cases to display             Social History  Social History     Socioeconomic History     Marital status:      Spouse name: None    Number of children: None    Years of education: None    Highest education level: None   Occupational History    None   Tobacco Use    Smoking status: Never    Smokeless tobacco: Never   Substance and Sexual Activity    Alcohol use: Not Currently    Drug use: Not Currently    Sexual activity: None   Other Topics Concern    None   Social History Narrative    None     Social Determinants of Health     Financial Resource Strain: Not on file   Food Insecurity: Not on file   Transportation Needs: Not on file   Physical Activity: Not on file   Stress: Not on file   Social Connections: Not on file   Intimate Partner Violence: Not on file   Housing Stability: Not on file       Family History  Family History   Problem Relation Name Age of Onset    Skin cancer Mother      No Known Problems Father      Diabetes Paternal Grandfather          Allergies  No Known Allergies    Review of Systems  REVIEW OF SYSTEMS  Constitutional: no unplanned weight loss  Psychiatric: no suicidal ideation  ENT: no vision changes, no sinus problems  Pulmonary: no shortness of breath during office visit today  Lymphatic: no enlarged lymph nodes  Cardiovascular: no chest pain or shortness of breath during office visit today  Gastrointestinal: no stomach problems  Genitourinary: no dysuria   Skin: no rashes  Endocrine: no thyroid problems  Neurological: no headache, no numbness  Hematological: no easy bruising  Musculoskeletal: Right ankle pain     Physical Exam  PHYSICAL EXAMINATION  Constitutional Exam: well developed and well nourished  Psychiatric Exam: alert and oriented, appropriate mood and behavior  Eye Exam: EOMI  Pulmonary Exam: breathing non-labored, no apparent distress  Lymphatic exam: no appreciable lymphadenopathy in the lower extremities  Cardiovascular exam: RRR to peripheral palpation, DP pulses 2+, PT 2+, toes are pink with good capillary refill, no pitting edema  Skin exam: no open  lesions, rashes, abrasions or ulcerations  Neurological exam: sensation to light touch intact in both lower extremities in peripheral and dermatomal distributions (except for any abnormalities noted in musculoskeletal exam)    Musculoskeletal exam: Right lower extremity examination.  Anteromedial and anterolateral ankle arthroscopy portals with suture line intact.  Lateral and medial ankle incisions also with suture line intact and skin edges well-approximated.  There is no obvious nadira-incisional erythema, induration, fluctuance or drainage to suggest underlying infection.  I am able to passively range the patient's ankle to neutral dorsiflexion.  Patient has sensation intact to light touch grossly in a saphenous, sural, superficial peroneal, deep peroneal and tibial nerve distribution.  She has intact PF/DF/EHL with 2+ DP/PT pulse palpated.  Negative anterior drawer/talar tilt test.    Last Recorded Vitals  Last menstrual period 07/29/2022.    Laboratory Results  No results found for this or any previous visit (from the past 24 hour(s)).     Radiology Results  No new imaging at this visit.    Assessment/Plan:  28-year-old female s/p R ankle arthroscopy with extensive debridement, open loose body removal and medial/lateral ligament reconstruction from 11/13/2023 with clinical evidence of healing wounds.  I will recommend the patient continue nonweightbearing to right lower extremity in a short leg fiberglass cast.  I will retain her sutures and she will continue on 40 mg Lovenox twice daily for DVT prophylaxis.  I will plan to see the patient back in approximately 2 weeks for repeat clinical evaluation.  I anticipate removing the patient's sutures at that time and transitioning and will discuss transition to nonweightbearing boot versus cast.  Upon return, patient would not require further imaging.    Samuel Corcoran MD, ANNIA  Department of Orthopaedic Surgery  [facility]    The diagnosis and treatment plan were reviewed with the patient. All questions were answered. The patient verbalized understanding of the treatment plan. There were no barriers to understanding identified.    Note dictated with [employer] software.  Completed without full type editing and sent to avoid delay.

## 2023-11-28 ENCOUNTER — APPOINTMENT (OUTPATIENT)
Dept: OPERATING ROOM | Facility: HOSPITAL | Age: 28
End: 2023-11-28
Payer: COMMERCIAL

## 2023-11-28 DIAGNOSIS — S93.491D SPRAIN OF ANTERIOR TALOFIBULAR LIGAMENT OF RIGHT ANKLE, SUBSEQUENT ENCOUNTER: Primary | ICD-10-CM

## 2023-11-29 ENCOUNTER — APPOINTMENT (OUTPATIENT)
Dept: ORTHOPEDIC SURGERY | Facility: CLINIC | Age: 28
End: 2023-11-29
Payer: COMMERCIAL

## 2023-11-30 ENCOUNTER — APPOINTMENT (OUTPATIENT)
Dept: PULMONOLOGY | Facility: HOSPITAL | Age: 28
End: 2023-11-30
Payer: COMMERCIAL

## 2023-12-03 ENCOUNTER — TELEPHONE (OUTPATIENT)
Dept: ORTHOPEDICS | Facility: HOSPITAL | Age: 28
End: 2023-12-03
Payer: COMMERCIAL

## 2023-12-03 DIAGNOSIS — M25.871 ANKLE IMPINGEMENT SYNDROME, RIGHT: ICD-10-CM

## 2023-12-03 DIAGNOSIS — S93.491D SPRAIN OF ANTERIOR TALOFIBULAR LIGAMENT OF RIGHT ANKLE, SUBSEQUENT ENCOUNTER: Primary | ICD-10-CM

## 2023-12-03 NOTE — TELEPHONE ENCOUNTER
I received notification through the answering service the patient was requesting a call back.  I contacted the patient via the number listed in the chart, [phone].  The plantar surface of the patient's cast was compromised in the shower on the evening of 12/02/2023.  Patient attempted to dry the surface with a hair dryer.  She reached out to [facility] who informed her that they would not be able to provide cast exchange and that she would need to contact her physician.  I reviewed that the patient could present to [facility] ER or urgent care for cast removal and that she could remain in a walking boot non-weight bearing until follow-up.      Alternatively, I discussed with the patient that I would coordinate a follow-up appointment for cast exchange.    Patient believes that only the plantar surface of the cast was compromised and remains moist.  I discussed that she could continue to attempt drying with a hair dryer in the interim but I will further coordinate her care with plans for cast exchange tomorrow.    Samuel Corcoran MD, ANNIA  Department of Orthopaedic Surgery  [facility]    Note dictated with [employer] software.  Completed without full type editing and sent to avoid delay.

## 2023-12-04 ENCOUNTER — OFFICE VISIT (OUTPATIENT)
Dept: ORTHOPEDIC SURGERY | Facility: CLINIC | Age: 28
End: 2023-12-04
Payer: COMMERCIAL

## 2023-12-04 DIAGNOSIS — M25.371 OTHER INSTABILITY, RIGHT ANKLE: ICD-10-CM

## 2023-12-04 DIAGNOSIS — M24.074 LOOSE BODY IN ANKLE AND FOOT JOINT, RIGHT: Primary | ICD-10-CM

## 2023-12-04 DIAGNOSIS — M24.071 LOOSE BODY IN ANKLE AND FOOT JOINT, RIGHT: Primary | ICD-10-CM

## 2023-12-04 DIAGNOSIS — M25.871 ANKLE IMPINGEMENT SYNDROME, RIGHT: ICD-10-CM

## 2023-12-04 PROCEDURE — 99024 POSTOP FOLLOW-UP VISIT: CPT | Performed by: STUDENT IN AN ORGANIZED HEALTH CARE EDUCATION/TRAINING PROGRAM

## 2023-12-04 PROCEDURE — 3008F BODY MASS INDEX DOCD: CPT | Performed by: STUDENT IN AN ORGANIZED HEALTH CARE EDUCATION/TRAINING PROGRAM

## 2023-12-04 PROCEDURE — 1036F TOBACCO NON-USER: CPT | Performed by: STUDENT IN AN ORGANIZED HEALTH CARE EDUCATION/TRAINING PROGRAM

## 2023-12-04 NOTE — PROGRESS NOTES
ORTHOPAEDIC SURGERY OUTPATIENT PROGRESS NOTE    Chief Complaint:  Right ankle pain    History Of Present Illness  28-year-old female presents for evaluation of right ankle pain in follow-up from urgent care. Patient reports an inversion type injury to her ankle on 05/29/2023. Patient was attempting to hike and a misstep. She reports no prior history of trauma that she can recall. She experienced severe pain and had difficulty bearing weight. She was in the woods at the time hiking and had a friend assist her to her vehicle as she was unable to bear weight. She is currently reporting 7 out of 10 pain. She been taking ibuprofen. She was seen in urgent care setting where x-rays were obtained and the patient was placed into a walking boot and made nonweightbearing with crutches. She denies any associated numbness, tingling or weakness. Patient works as a  and was sent home from work today until further evaluation.     Patient is a current non-smoker, nondiabetic and does not use any anticoagulation.     Please see detailed patient history sheet for further information.     06/20/2023: Patient returns in follow-up of her right ankle injury. Patient reports that she has been essentially nonweightbearing from her prior appointment. She has not been comfortable putting weight down has been using crutches for toileting. She denies any new trauma or associated numbness, tingling weakness. She has noted that her swelling has decreased significantly. She has not yet returned to work.     07/03/2023: Patient returns for follow-up of her right ankle injury. Patient has transitioned to weightbearing in regular shoes and is currently wearing sandals with ankle brace. She reports returning to work with some residual, 3 out of 10 pain. She has not formally started physical therapy but has been doing HEP at home. Pain is worse when she has a misstep with some associated swelling. She has been using compression socks. She is  leaving Friday for vacation.     08/11/2023: Patient returns for follow-up of her right ankle injury. She has been weightbearing as tolerated in regular shoes and continuing to use her ankle brace. She is reporting continued pain in the ankle that worsens with activity and is rated a 7 out of 10 particular with turning her ankle in. She has tried physical therapy that appears to have helped some but still has pain. She has returned to work and is not reporting any finn instability at this time.     09/27/2023: Patient returns for follow-up of her right ankle injury. She is continued in physical therapy with the use of a brace. Unfortunately, she continues to complain of intermittent moderate to severe, typically 7 out of 10 pain when going downstairs. Pain is predominantly on the inside of her ankle. She does report subjective symptoms with ankle giving out or giving way.    10/26/2023: Patient returns for follow-up of her right ankle injury.  She has continued with severe ankle pain, typically 7-8 out of 10. Pain is also noted when going downstairs.  She has a clicking and catching.  She does note the ankle gives out or gives way.  She typically feels better in the brace.    11/22/2023: Patient returns s/p R ankle arthroscopy with extensive debridement, open loose body removal and medial/lateral ligament reconstruction from 11/13/2023.  Patient reports 4 out of 10 pain.  She really has had no numbness, tingling or weakness.  She reports that her ankle feels improved from prior to surgery.    12/04/2023: Patient returns as above s/p R ankle arthroscopy with extensive debridement, open loose body removal and medial/lateral ligament reconstruction from 11/13/2023.  Patient continues to report 4 out of 10 pain.  This is worse while at work when she is unable to elevate her extremity.  As per our telephone encounter, the patient got her cast wet and presents earlier than anticipated for cast exchange.  She denies any  interval trauma, she has been compliant with nonweightbearing restrictions.  She has continued on Lovenox for DVT prophylaxis.     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    History of neoplasm in situ of cervix       Surgical History  Recent Surgeries in Orthopaedic Surgery            No cases to display             Social History  Social History     Socioeconomic History    Marital status:      Spouse name: None    Number of children: None    Years of education: None    Highest education level: None   Occupational History    None   Tobacco Use    Smoking status: Never    Smokeless tobacco: Never   Substance and Sexual Activity    Alcohol use: Not Currently    Drug use: Not Currently    Sexual activity: None   Other Topics Concern    None   Social History Narrative    None     Social Determinants of Health     Financial Resource Strain: Not on file   Food Insecurity: Not on file   Transportation Needs: Not on file   Physical Activity: Not on file   Stress: Not on file   Social Connections: Not on file   Intimate Partner Violence: Not on file   Housing Stability: Not on file       Family History  Family History   Problem Relation Name Age of Onset    Skin cancer Mother      No Known Problems Father      Diabetes Paternal Grandfather          Allergies  No Known Allergies    Review of Systems  REVIEW OF SYSTEMS  Constitutional: no unplanned weight loss  Psychiatric: no suicidal ideation  ENT: no vision changes, no sinus problems  Pulmonary: no shortness of breath during office visit today  Lymphatic: no enlarged lymph nodes  Cardiovascular: no chest pain or shortness of breath during office visit today  Gastrointestinal: no stomach problems  Genitourinary: no dysuria   Skin: no rashes  Endocrine: no thyroid problems  Neurological:  no headache, no numbness  Hematological: no easy bruising  Musculoskeletal: Right ankle pain     Physical Exam  PHYSICAL EXAMINATION  Constitutional Exam: well developed and well nourished  Psychiatric Exam: alert and oriented, appropriate mood and behavior  Eye Exam: EOMI  Pulmonary Exam: breathing non-labored, no apparent distress  Lymphatic exam: no appreciable lymphadenopathy in the lower extremities  Cardiovascular exam: RRR to peripheral palpation, DP pulses 2+, PT 2+, toes are pink with good capillary refill, no pitting edema  Skin exam: no open lesions, rashes, abrasions or ulcerations  Neurological exam: sensation to light touch intact in both lower extremities in peripheral and dermatomal distributions (except for any abnormalities noted in musculoskeletal exam)    Musculoskeletal exam: Right lower extremity examination.  Anteromedial and anterolateral ankle arthroscopy portals with skin edges well-approximated and suture line intact.  Medial and lateral ankle incisions also with suture line intact and skin edges well-approximated.  There is no obvious nadira-incisional erythema, induration, fluctuance or drainage to suggest underlying infection.  There is no obvious ankle effusion.  I am able to passively range the patient's ankle to neutral dorsiflexion.  Patient has sensation intact light touch grossly in a saphenous, sural, superficial peroneal, deep peroneal and tibial nerve distribution.  She has intact PF/DF/EHL with 2+ DP/PT pulse palpated.  She is a negative anterior drawer and talar tilt test.    Last Recorded Vitals  Last menstrual period 07/29/2022.    Laboratory Results  No results found for this or any previous visit (from the past 24 hour(s)).     Radiology Results  No new imaging at this visit.    Assessment/Plan:  28-year-old female s/p R ankle arthroscopy with extensive debridement, open loose body removal and medial/lateral ligament reconstruction from 11/13/2023 with clinical evidence of  healing wounds.  In my impression, the patient may continue nonweightbearing right lower extremity.  I discussed progressing her to a boot at this visit, however the patient is concerned that she may have potentially subsequent injury and so we elected to proceed with a nonweightbearing short leg fiberglass cast.  I will remove her sutures today and she will continue on 40 mg Lovenox twice daily for DVT prophylaxis.  I will plan to see the patient back in 2 weeks for a repeat clinical evaluation.  I anticipate transitioning the patient into a walking boot at that time and initiating physical therapy.  Upon return, patient would not require further imaging.    Samuel Corcoran MD, ANNIA  Department of Orthopaedic Surgery  [facility]    The diagnosis and treatment plan were reviewed with the patient. All questions were answered. The patient verbalized understanding of the treatment plan. There were no barriers to understanding identified.    Note dictated with [employer] software.  Completed without full type editing and sent to avoid delay.

## 2023-12-05 ENCOUNTER — APPOINTMENT (OUTPATIENT)
Dept: SURGERY | Facility: CLINIC | Age: 28
End: 2023-12-05
Payer: COMMERCIAL

## 2023-12-06 ENCOUNTER — TELEPHONE (OUTPATIENT)
Dept: ORTHOPEDIC SURGERY | Facility: CLINIC | Age: 28
End: 2023-12-06
Payer: COMMERCIAL

## 2023-12-12 ENCOUNTER — NUTRITION (OUTPATIENT)
Dept: SURGERY | Facility: CLINIC | Age: 28
End: 2023-12-12
Payer: COMMERCIAL

## 2023-12-12 ENCOUNTER — APPOINTMENT (OUTPATIENT)
Dept: ORTHOPEDIC SURGERY | Facility: CLINIC | Age: 28
End: 2023-12-12
Payer: COMMERCIAL

## 2023-12-12 VITALS — BODY MASS INDEX: 48.58 KG/M2 | WEIGHT: 264 LBS | HEIGHT: 62 IN

## 2023-12-12 DIAGNOSIS — E66.01 CLASS 3 SEVERE OBESITY DUE TO EXCESS CALORIES WITH BODY MASS INDEX (BMI) OF 45.0 TO 49.9 IN ADULT, UNSPECIFIED WHETHER SERIOUS COMORBIDITY PRESENT (MULTI): Primary | ICD-10-CM

## 2023-12-12 NOTE — PROGRESS NOTES
"PREOPERATIVE, MULTIDISCIPLINARY, MEDICALLY SUPERVISED, REDUCED CALORIE DIET, BEHAVIOR MODIFICATION AND EXERCISE PROGRAM    S:  The patient had surgery on her right ankle last month and is recovering.  Activity is limited but walking in small amounts using crutches.  Will likely start physical therapy next month.  Working on modifying her diet.  24 hour food recall: B: Greek yogurt with blueberries; L: grilled chicken, vegetables and brown rice; D: grilled cheese on low carb wrap and steamed broccoli.    O:    Vitals:    12/12/23 1444   Weight: 120 kg (264 lb)      Ht:  1.575 m (5' 2\")   BMI:  Body mass index is 48.29 kg/m².    Goal: 5% body weight loss over the course of program    Dietary recommendation:   1. Continue to drink at least 64 ounces of water and calorie free, non-carbonated, and decaffeinated beverages daily.  2. Practice the 30-30-30 rule by drinking between meals.  3. Structure your meal plan - have 3 meals and 1 snack daily.  4. Have balanced meals that always contain a good source of protein.  5. Increase intake of non-starchy vegetables.  Have 5 servings fruits and vegetables daily.   6. Continue to take a multivitamin daily.  7. Increase physical activity by 10-15 minutes to an end goal of 60 minutes 5 x per week.    Group Topic: Emotional Eating    Behavioral recommendation: Pt is encouraged to practice identifying the differences between emotional hunger and physical hunger and to implement distraction and confrontation techniques to avoid emotional eating.     A/P: Pt appears to have a good understanding of how to characterize the two different types of hunger.  Pt has a goal to keep a food diary and to utilize at least two of the techniques learned in class to deter emotional eating.    Exercise: walking in small amounts.  Recovering from ankle surgery and is using crutches to walk (non-weight bearing on right ankle)    Patient will follow-up in 1 month (2/6 consecutive [facility]sw[facility] visits)    Amanda" H Chintan, RD, LD

## 2023-12-18 ENCOUNTER — TELEPHONE (OUTPATIENT)
Dept: SURGERY | Facility: CLINIC | Age: 28
End: 2023-12-18
Payer: COMMERCIAL

## 2023-12-18 ENCOUNTER — TELEPHONE (OUTPATIENT)
Dept: PREADMISSION TESTING | Facility: HOSPITAL | Age: 28
End: 2023-12-18
Payer: COMMERCIAL

## 2023-12-18 NOTE — TELEPHONE ENCOUNTER
I called dr evans's office, spoke with maria elena chan, informed her that pt is still taking lovenox as preventative measure for dvt s/p right ankle [id number]. Maria Elena will message dr evans and then reach out to pt.

## 2023-12-18 NOTE — TELEPHONE ENCOUNTER
RN made aware that patient is on Lovenox for ankle surgery that was in November. This RN made Dr. Salmon aware. Dr. Salmon stated to have patient stop after dose today until after her EGD on Wednesday. RN educated patient of what Dr. Salmon stated. Patient verbalized understanding.

## 2023-12-18 NOTE — TELEPHONE ENCOUNTER
SURGERY PRE-OPERATIVE INSTRUCTIONS    *You will receive a phone call the day before your procedure  after 2pm, (or the Friday before your surgery if scheduled on a Monday.) Generally the hospital will be calling you with this information after that time.    *You are not to eat after midnight the night before the surgery. You may have 8oz of a clear liquid up until 2 hours prior to arriving to the hospital. The exception is with medications you were instructed to take day of surgery.    *You may take tylenol for pain/discomfort as needed.     *Stop taking all aspirin products, ibuprofen (motrin/advil), naproxen (aleve/naprosyn) for one week prior to surgery.    *Stop taking all vitamins and supplements one week prior to surgery.     *You should not have alcoholic beverages for 24 hours before surgery.     *You should not smoke 24 hours prior to surgery.     *To help prevent surgical infections bathe/shower with Dial soap the evening before surgery.    *You can wear deodorant but no lotion, powder, or perfume/cologne. You should remove all make-up and nail polish at home.    *If you wear glasses, please bring a case for the glasses with you.    *You will be asked to remove dentures and contacts.     *Please leave all valuables at home.    *You should wear loose, comfortable clothing that will accommodate bandages and/or casts.    *You should notify your doctor of any change in your condition (fever, cold, rash, etc). Surgery may need to be re-scheduled until a time you are in better health.    *A responsible adult is required to accompany you to and from the hospital if you are receiving anesthesia or a sedative. Patients are not permitted to drive for 24 hours after anesthesia.     *You can use the [employer] parking if you wish.     *If you have any further questions please call [phone].

## 2023-12-19 ENCOUNTER — ANESTHESIA EVENT (OUTPATIENT)
Dept: OPERATING ROOM | Facility: HOSPITAL | Age: 28
End: 2023-12-19
Payer: COMMERCIAL

## 2023-12-20 ENCOUNTER — HOSPITAL ENCOUNTER (OUTPATIENT)
Dept: OPERATING ROOM | Facility: HOSPITAL | Age: 28
Setting detail: OUTPATIENT SURGERY
Discharge: HOME | End: 2023-12-20
Payer: COMMERCIAL

## 2023-12-20 ENCOUNTER — OFFICE VISIT (OUTPATIENT)
Dept: ORTHOPEDIC SURGERY | Facility: CLINIC | Age: 28
End: 2023-12-20
Payer: COMMERCIAL

## 2023-12-20 ENCOUNTER — ANESTHESIA (OUTPATIENT)
Dept: OPERATING ROOM | Facility: HOSPITAL | Age: 28
End: 2023-12-20
Payer: COMMERCIAL

## 2023-12-20 VITALS
SYSTOLIC BLOOD PRESSURE: 136 MMHG | BODY MASS INDEX: 48.95 KG/M2 | HEIGHT: 62 IN | HEART RATE: 92 BPM | WEIGHT: 266 LBS | DIASTOLIC BLOOD PRESSURE: 72 MMHG | TEMPERATURE: 97.5 F | OXYGEN SATURATION: 100 % | RESPIRATION RATE: 18 BRPM

## 2023-12-20 DIAGNOSIS — E66.01 MORBID OBESITY (MULTI): ICD-10-CM

## 2023-12-20 DIAGNOSIS — K21.00 GASTROESOPHAGEAL REFLUX DISEASE WITH ESOPHAGITIS WITHOUT HEMORRHAGE: Primary | ICD-10-CM

## 2023-12-20 DIAGNOSIS — F51.09 OTHER INSOMNIA NOT DUE TO A SUBSTANCE OR KNOWN PHYSIOLOGICAL CONDITION: ICD-10-CM

## 2023-12-20 DIAGNOSIS — M25.371 OTHER INSTABILITY, RIGHT ANKLE: ICD-10-CM

## 2023-12-20 DIAGNOSIS — S93.421D SPRAIN OF DELTOID LIGAMENT OF RIGHT ANKLE, SUBSEQUENT ENCOUNTER: ICD-10-CM

## 2023-12-20 DIAGNOSIS — S93.491D SPRAIN OF ANTERIOR TALOFIBULAR LIGAMENT OF RIGHT ANKLE, SUBSEQUENT ENCOUNTER: Primary | ICD-10-CM

## 2023-12-20 PROBLEM — E05.90 HYPERTHYROIDISM: Status: RESOLVED | Noted: 2023-09-02 | Resolved: 2023-12-20

## 2023-12-20 PROBLEM — D64.9 ANEMIA: Status: ACTIVE | Noted: 2023-12-20

## 2023-12-20 PROCEDURE — 7100000010 HC PHASE TWO TIME - EACH INCREMENTAL 1 MINUTE: Performed by: NURSE ANESTHETIST, CERTIFIED REGISTERED

## 2023-12-20 PROCEDURE — 3600000002 HC OR TIME - INITIAL BASE CHARGE - PROCEDURE LEVEL TWO: Performed by: NURSE ANESTHETIST, CERTIFIED REGISTERED

## 2023-12-20 PROCEDURE — 3700000001 HC GENERAL ANESTHESIA TIME - INITIAL BASE CHARGE: Performed by: NURSE ANESTHETIST, CERTIFIED REGISTERED

## 2023-12-20 PROCEDURE — 88305 TISSUE EXAM BY PATHOLOGIST: CPT | Mod: TC,SUR,GEALAB | Performed by: SURGERY

## 2023-12-20 PROCEDURE — 99024 POSTOP FOLLOW-UP VISIT: CPT | Performed by: STUDENT IN AN ORGANIZED HEALTH CARE EDUCATION/TRAINING PROGRAM

## 2023-12-20 PROCEDURE — 2500000004 HC RX 250 GENERAL PHARMACY W/ HCPCS (ALT 636 FOR OP/ED): Performed by: NURSE ANESTHETIST, CERTIFIED REGISTERED

## 2023-12-20 PROCEDURE — A43239 PR EDG TRANSORAL BIOPSY SINGLE/MULTIPLE: Performed by: NURSE ANESTHETIST, CERTIFIED REGISTERED

## 2023-12-20 PROCEDURE — L4361 PNEUMA/VAC WALK BOOT PRE OTS: HCPCS | Performed by: STUDENT IN AN ORGANIZED HEALTH CARE EDUCATION/TRAINING PROGRAM

## 2023-12-20 PROCEDURE — 3600000007 HC OR TIME - EACH INCREMENTAL 1 MINUTE - PROCEDURE LEVEL TWO: Performed by: NURSE ANESTHETIST, CERTIFIED REGISTERED

## 2023-12-20 PROCEDURE — 43239 EGD BIOPSY SINGLE/MULTIPLE: CPT | Performed by: SURGERY

## 2023-12-20 PROCEDURE — 3008F BODY MASS INDEX DOCD: CPT | Performed by: STUDENT IN AN ORGANIZED HEALTH CARE EDUCATION/TRAINING PROGRAM

## 2023-12-20 PROCEDURE — 7100000009 HC PHASE TWO TIME - INITIAL BASE CHARGE: Performed by: NURSE ANESTHETIST, CERTIFIED REGISTERED

## 2023-12-20 PROCEDURE — 2500000004 HC RX 250 GENERAL PHARMACY W/ HCPCS (ALT 636 FOR OP/ED): Performed by: ANESTHESIOLOGY

## 2023-12-20 PROCEDURE — 3700000002 HC GENERAL ANESTHESIA TIME - EACH INCREMENTAL 1 MINUTE: Performed by: NURSE ANESTHETIST, CERTIFIED REGISTERED

## 2023-12-20 PROCEDURE — 1036F TOBACCO NON-USER: CPT | Performed by: STUDENT IN AN ORGANIZED HEALTH CARE EDUCATION/TRAINING PROGRAM

## 2023-12-20 PROCEDURE — 88305 TISSUE EXAM BY PATHOLOGIST: CPT | Performed by: STUDENT IN AN ORGANIZED HEALTH CARE EDUCATION/TRAINING PROGRAM

## 2023-12-20 RX ORDER — ACETAMINOPHEN 325 MG/1
650 TABLET ORAL EVERY 4 HOURS PRN
Status: DISCONTINUED | OUTPATIENT
Start: 2023-12-20 | End: 2023-12-21 | Stop reason: HOSPADM

## 2023-12-20 RX ORDER — PROPOFOL 10 MG/ML
INJECTION, EMULSION INTRAVENOUS CONTINUOUS PRN
Status: DISCONTINUED | OUTPATIENT
Start: 2023-12-20 | End: 2023-12-20

## 2023-12-20 RX ORDER — ALBUTEROL SULFATE 0.83 MG/ML
2.5 SOLUTION RESPIRATORY (INHALATION) ONCE AS NEEDED
Status: DISCONTINUED | OUTPATIENT
Start: 2023-12-20 | End: 2023-12-21 | Stop reason: HOSPADM

## 2023-12-20 RX ORDER — FAMOTIDINE 20 MG/1
20 TABLET, FILM COATED ORAL NIGHTLY
Qty: 90 TABLET | Refills: 1 | Status: SHIPPED | OUTPATIENT
Start: 2023-12-20 | End: 2024-06-05 | Stop reason: HOSPADM

## 2023-12-20 RX ORDER — ONDANSETRON HYDROCHLORIDE 2 MG/ML
8 INJECTION, SOLUTION INTRAVENOUS ONCE
Status: DISCONTINUED | OUTPATIENT
Start: 2023-12-20 | End: 2023-12-21 | Stop reason: HOSPADM

## 2023-12-20 RX ORDER — SODIUM CHLORIDE, SODIUM LACTATE, POTASSIUM CHLORIDE, CALCIUM CHLORIDE 600; 310; 30; 20 MG/100ML; MG/100ML; MG/100ML; MG/100ML
100 INJECTION, SOLUTION INTRAVENOUS CONTINUOUS
Status: DISCONTINUED | OUTPATIENT
Start: 2023-12-20 | End: 2023-12-21 | Stop reason: HOSPADM

## 2023-12-20 RX ORDER — PROPOFOL 10 MG/ML
INJECTION, EMULSION INTRAVENOUS AS NEEDED
Status: DISCONTINUED | OUTPATIENT
Start: 2023-12-20 | End: 2023-12-20

## 2023-12-20 RX ORDER — FENTANYL CITRATE 50 UG/ML
INJECTION, SOLUTION INTRAMUSCULAR; INTRAVENOUS AS NEEDED
Status: DISCONTINUED | OUTPATIENT
Start: 2023-12-20 | End: 2023-12-20

## 2023-12-20 RX ADMIN — PROPOFOL 300 MCG/KG/MIN: 10 INJECTION, EMULSION INTRAVENOUS at 08:10

## 2023-12-20 RX ADMIN — FENTANYL CITRATE 50 MCG: 50 INJECTION, SOLUTION INTRAMUSCULAR; INTRAVENOUS at 08:11

## 2023-12-20 RX ADMIN — FENTANYL CITRATE 50 MCG: 50 INJECTION, SOLUTION INTRAMUSCULAR; INTRAVENOUS at 08:14

## 2023-12-20 RX ADMIN — SODIUM CHLORIDE, POTASSIUM CHLORIDE, SODIUM LACTATE AND CALCIUM CHLORIDE 100 ML/HR: 600; 310; 30; 20 INJECTION, SOLUTION INTRAVENOUS at 07:30

## 2023-12-20 RX ADMIN — PROPOFOL 20 MG: 10 INJECTION, EMULSION INTRAVENOUS at 08:17

## 2023-12-20 SDOH — HEALTH STABILITY: MENTAL HEALTH: CURRENT SMOKER: 0

## 2023-12-20 ASSESSMENT — PAIN SCALES - GENERAL
PAIN_LEVEL: 2
PAINLEVEL_OUTOF10: 0 - NO PAIN
PAINLEVEL_OUTOF10: 0 - NO PAIN

## 2023-12-20 ASSESSMENT — PAIN - FUNCTIONAL ASSESSMENT
PAIN_FUNCTIONAL_ASSESSMENT: 0-10
PAIN_FUNCTIONAL_ASSESSMENT: 0-10

## 2023-12-20 NOTE — ANESTHESIA PREPROCEDURE EVALUATION
Patient: Erick Becerril    Procedure Information       Date/Time: 12/20/23 0800    Scheduled providers: Nacho Salmon MD    Procedure: EGD    Location: [facility] OR            Vitals:    12/20/23 0652   BP: 145/84   Pulse: 81   Resp: 18   Temp: 36.1 °C (97 °F)   SpO2: 100%       Past Surgical History:   Procedure Laterality Date    ANKLE ARTHROSCOPY W/ OPEN REPAIR Right 11/13/2023    right ankle arthroscopy with ligament repair    CHOLECYSTECTOMY      HYSTERECTOMY      TUBAL LIGATION      WISDOM TOOTH EXTRACTION       Past Medical History:   Diagnosis Date    Anxiety     Bariatric surgery status     Cystic fibrosis carrier     Insomnia     Morbid obesity with BMI of 45.0-49.9, adult (CMS/[facility])     PCOS (polycystic ovarian syndrome)     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 For DVT prophylaxis, Disp: 84 each, Rfl: 0    Current Facility-Administered Medications:     lactated Ringer's infusion, 100 mL/hr, intravenous, Continuous, Lauro Shearer MD, Last Rate: 100 mL/hr at 12/20/23 0730, 100 mL/hr at 12/20/23 0730  Prior to Admission medications    Medication Sig Start Date End Date Taking? Authorizing Provider   cholecalciferol (Vitamin D3) 50 MCG (2000 UT) tablet Take 1 tablet (2,000 Units) by mouth once daily. 8/8/14  Yes Historical Provider, MD   enoxaparin (Lovenox) 40 mg/0.4 mL syringe Inject 0.4 mL (40 mg) under the skin 2 times a day. For DVT prophylaxis 11/13/23 12/25/23 Yes Samuel Corcroan MD     No Known Allergies  Social History     Tobacco Use    Smoking status: Never    Smokeless tobacco: Never    Substance Use Topics    Alcohol use: Never         Chemistry    Lab Results   Component Value Date/Time     11/09/2023 0858    K 4.0 11/09/2023 0858     11/09/2023 0858    CO2 24 11/09/2023 0858    BUN 13 11/09/2023 0858    CREATININE 0.69 11/09/2023 0858    Lab Results   Component Value Date/Time    CALCIUM 8.9 11/09/2023 0858    ALKPHOS 53 11/09/2023 0858    AST 11 11/09/2023 0858    ALT 9 11/09/2023 0858    BILITOT 0.5 11/09/2023 0858          Lab Results   Component Value Date/Time    WBC 10.8 11/09/2023 0858    HGB 12.4 11/09/2023 0858    HCT 38.2 11/09/2023 0858     11/09/2023 0858     Lab Results   Component Value Date/Time    PROTIME 12.0 11/09/2023 0858    INR 1.1 11/09/2023 0858     No results found for this or any previous visit (from the past 4464 hour(s)).  No results found for this or any previous visit from the past 1095 days.      Clinical information reviewed:   Tobacco  Allergies  Meds   Med Hx  Surg Hx   Fam Hx  Soc Hx        NPO Detail:  NPO/Void Status  Date of Last Liquid: 12/20/23  Time of Last Liquid: 0000  Date of Last Solid: 12/19/23  Time of Last Solid: 1900         Physical Exam    Airway  Mallampati: II  TM distance: >3 FB  Neck ROM: full     Cardiovascular   Rhythm: regular  Rate: normal     Dental - normal exam     Pulmonary - normal exam     Abdominal   (+) obese             Anesthesia Plan    ASA 2     MAC     The patient is not a current smoker.  Patient was not previously instructed to abstain from smoking on day of procedure.    intravenous induction   Anesthetic plan and risks discussed with patient.  Use of blood products discussed with patient who.    Plan discussed with CRNA and attending.

## 2023-12-20 NOTE — PROGRESS NOTES
ORTHOPAEDIC SURGERY OUTPATIENT PROGRESS NOTE    Chief Complaint:  Right ankle pain    History Of Present Illness  28-year-old female presents for evaluation of right ankle pain in follow-up from urgent care. Patient reports an inversion type injury to her ankle on 05/29/2023. Patient was attempting to hike and a misstep. She reports no prior history of trauma that she can recall. She experienced severe pain and had difficulty bearing weight. She was in the woods at the time hiking and had a friend assist her to her vehicle as she was unable to bear weight. She is currently reporting 7 out of 10 pain. She been taking ibuprofen. She was seen in urgent care setting where x-rays were obtained and the patient was placed into a walking boot and made nonweightbearing with crutches. She denies any associated numbness, tingling or weakness. Patient works as a  and was sent home from work today until further evaluation.     Patient is a current non-smoker, nondiabetic and does not use any anticoagulation.     Please see detailed patient history sheet for further information.     06/20/2023: Patient returns in follow-up of her right ankle injury. Patient reports that she has been essentially nonweightbearing from her prior appointment. She has not been comfortable putting weight down has been using crutches for toileting. She denies any new trauma or associated numbness, tingling weakness. She has noted that her swelling has decreased significantly. She has not yet returned to work.     07/03/2023: Patient returns for follow-up of her right ankle injury. Patient has transitioned to weightbearing in regular shoes and is currently wearing sandals with ankle brace. She reports returning to work with some residual, 3 out of 10 pain. She has not formally started physical therapy but has been doing HEP at home. Pain is worse when she has a misstep with some associated swelling. She has been using compression socks. She is  leaving Friday for vacation.     08/11/2023: Patient returns for follow-up of her right ankle injury. She has been weightbearing as tolerated in regular shoes and continuing to use her ankle brace. She is reporting continued pain in the ankle that worsens with activity and is rated a 7 out of 10 particular with turning her ankle in. She has tried physical therapy that appears to have helped some but still has pain. She has returned to work and is not reporting any finn instability at this time.     09/27/2023: Patient returns for follow-up of her right ankle injury. She is continued in physical therapy with the use of a brace. Unfortunately, she continues to complain of intermittent moderate to severe, typically 7 out of 10 pain when going downstairs. Pain is predominantly on the inside of her ankle. She does report subjective symptoms with ankle giving out or giving way.    10/26/2023: Patient returns for follow-up of her right ankle injury.  She has continued with severe ankle pain, typically 7-8 out of 10. Pain is also noted when going downstairs.  She has a clicking and catching.  She does note the ankle gives out or gives way.  She typically feels better in the brace.    11/22/2023: Patient returns s/p R ankle arthroscopy with extensive debridement, open loose body removal and medial/lateral ligament reconstruction from 11/13/2023.  Patient reports 4 out of 10 pain.  She really has had no numbness, tingling or weakness.  She reports that her ankle feels improved from prior to surgery.    12/04/2023: Patient returns as above s/p R ankle arthroscopy with extensive debridement, open loose body removal and medial/lateral ligament reconstruction from 11/13/2023.  Patient continues to report 4 out of 10 pain.  This is worse while at work when she is unable to elevate her extremity.  As per our telephone encounter, the patient got her cast wet and presents earlier than anticipated for cast exchange.  She denies any  interval trauma, she has been compliant with nonweightbearing restrictions.  She has continued on Lovenox for DVT prophylaxis.    12/20/2023: Patient returns as above s/p right ankle arthroscopy with extensive debridement, open loose body removal and medial/lateral ligament reconstruction from 11/13/2023.  Patient continues with a 4 out of 10 pain.  She has been compliant with nonweightbearing restrictions.  She has continued on Lovenox for DVT prophylaxis.     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    History of neoplasm in situ of cervix    Right ankle injury 05/29/2023    RLS (restless legs syndrome)        Surgical History  Recent Surgeries in Orthopaedic Surgery            No cases to display             Social History  Social History     Socioeconomic History    Marital status:      Spouse name: None    Number of children: None    Years of education: None    Highest education level: None   Occupational History    None   Tobacco Use    Smoking status: Never    Smokeless tobacco: Never   Vaping Use    Vaping Use: Never used   Substance and Sexual Activity    Alcohol use: Never    Drug use: Not Currently    Sexual activity: None   Other Topics Concern    None   Social History Narrative    None     Social Determinants of Health     Financial Resource Strain: Not on file   Food Insecurity: Not on file   Transportation Needs: Not on file   Physical Activity: Not on file   Stress: Not on file   Social Connections: Not on file   Intimate Partner Violence: Not on file   Housing Stability: Not on file       Family History  Family History   Problem Relation Name Age of Onset    Skin cancer Mother      No Known Problems Father      Diabetes Paternal  Grandfather          Allergies  No Known Allergies    Review of Systems  REVIEW OF SYSTEMS  Constitutional: no unplanned weight loss  Psychiatric: no suicidal ideation  ENT: no vision changes, no sinus problems  Pulmonary: no shortness of breath during office visit today  Lymphatic: no enlarged lymph nodes  Cardiovascular: no chest pain or shortness of breath during office visit today  Gastrointestinal: no stomach problems  Genitourinary: no dysuria   Skin: no rashes  Endocrine: no thyroid problems  Neurological: no headache, no numbness  Hematological: no easy bruising  Musculoskeletal: Right ankle pain     Physical Exam  PHYSICAL EXAMINATION  Constitutional Exam: well developed and well nourished  Psychiatric Exam: alert and oriented, appropriate mood and behavior  Eye Exam: EOMI  Pulmonary Exam: breathing non-labored, no apparent distress  Lymphatic exam: no appreciable lymphadenopathy in the lower extremities  Cardiovascular exam: RRR to peripheral palpation, DP pulses 2+, PT 2+, toes are pink with good capillary refill, no pitting edema  Skin exam: no open lesions, rashes, abrasions or ulcerations  Neurological exam: sensation to light touch intact in both lower extremities in peripheral and dermatomal distributions (except for any abnormalities noted in musculoskeletal exam)    Musculoskeletal exam: Right lower extremity examination.  Anteromedial and anterolateral ankle arthroscopy portals and medial and lateral ankle incisions well-healed at this point.  There is no obvious nadira-incisional erythema, induration, fluctuance or drainage to suggest underlying infection.  There is no obvious ankle effusion.  I am able to passively range the patient's ankle to neutral dorsiflexion.  Patient has sensation intact light touch grossly in a saphenous, sural, superficial peroneal, deep peroneal and tibial nerve distribution.  She has intact PF/DF/EHL with 2+ DP/PT pulse palpated.  She is a negative anterior drawer and  talar tilt test.    Last Recorded Vitals  Last menstrual period 07/29/2022.    Laboratory Results  No results found for this or any previous visit (from the past 24 hour(s)).     Radiology Results  No new imaging at this visit.    Assessment/Plan:  28-year-old female s/p R ankle arthroscopy with extensive debridement, open loose body removal and medial/lateral ligament reconstruction from 11/13/2023 with clinical evidence of healing wounds.  In my impression, the patient may begin progressive weightbearing in her right lower extremity.  I encouraged her to remain off of her leg for an additional week but will transition her from cast to boot and then she may begin progressive weightbearing with crutches next week.  I will refer her formally to physical therapy to begin ROM with care to avoid inversion and eversion.  She will continue on 40 mg Lovenox twice daily for DVT prophylaxis while she remains in the boot.  I will plan to see the patient back in approximately 1 month to follow her clinical course.  Upon return, patient would not require further imaging    Patient was prescribed a mobility device, walking boot,  for ankle sprain problem.  This mobility device is required for the following reasons:    1. The patient has a mobility limitation that significantly impairs their ability to participate in one or more mobility-related activities of daily living (MRADL) in the home; and  2. The patient is able to safely use the mobility device; and  3. The functional mobility deficit can be sufficiently resolved with use of the mobility device    Verbal and written instructions for the use, wear schedule, cleaning and application of this item were given.  Education provided also included gait training and safety precautions when using this device. Patient was instructed that should the item result in increased pain, decreased sensation, increased swelling, or an overall worsening of their medical condition, to please  contact our office immediately.       Samuel Corcoran MD, ANNIA  Department of Orthopaedic Surgery  [facility] [place]    The diagnosis and treatment plan were reviewed with the patient. All questions were answered. The patient verbalized understanding of the treatment plan. There were no barriers to understanding identified.    Note dictated with Epos software.  Completed without full type editing and sent to avoid delay.

## 2023-12-20 NOTE — H&P
Bariatric/General Surgery H&P    12/20/2023 8:01 AM  Erick Becerril  [id number]    Erick Becerril is a 28 y.o. year old female with morbid obesity and GERD. she presents today for endoscopic evaluation of GERD in preparation for bariatric surgery.    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    History of neoplasm in situ of cervix    Right ankle injury 05/29/2023    RLS (restless legs syndrome)         PAST SURGICAL HISTORY:  Past Surgical History:   Procedure Laterality Date    ANKLE ARTHROSCOPY W/ OPEN REPAIR Right 11/13/2023    right ankle arthroscopy with ligament repair    CHOLECYSTECTOMY      HYSTERECTOMY      TUBAL LIGATION      WISDOM TOOTH EXTRACTION         FAMILY HISTORY:  Family History   Problem Relation Name Age of Onset    Skin cancer Mother      No Known Problems Father      Diabetes Paternal Grandfather          SOCIAL HISTORY:  Social History     Tobacco Use    Smoking status: Never    Smokeless tobacco: Never   Vaping Use    Vaping Use: Never used   Substance Use Topics    Alcohol use: Never    Drug use: Not Currently       MEDICATIONS:  Prior to Admission Medications:  [facility]    ALLERGIES:  No Known Allergies    REVIEW OF SYSTEMS:  GENERAL: Negative for malaise, significant weight loss and fever  NECK: Negative for lumps, goiter, pain and significant neck swelling  RESPIRATORY: Negative for cough, wheezing or shortness of breath.  CARDIOVASCULAR: Negative for chest pain, leg swelling or palpitations.  GI: Negative for abdominal discomfort, blood in stools or black stools or change in bowel habits  : No history of dysuria, frequency or incontinence  MUSCULOSKELETAL: Negative for joint pain or swelling, back pain or muscle  pain.  SKIN: Negative for lesions, rash, and itching.  PSYCH: Negative for sleep disturbance, mood disorder and recent psychosocial stressors.  ENDOCRINE: Negative for cold or heat intolerance, polyuria, polydipsia and goiter.    PHYSICAL EXAM:  Vitals:    12/20/23 0652   BP: 145/84   Pulse: 81   Resp: 18   Temp: 36.1 °C (97 °F)   SpO2: 100%     General appearance: obese  Skin: warm, no erythema or rashes  Lungs: clear to percussion and auscultation  Heart: regular rhythm and S1, S2 normal  Abdomen: soft, non-tender, no masses, no organomegaly  Extremities: Normal exam of the extremities. No swelling or pain.    IMPRESSION:  Erick Becerril is a 28 y.o. female with morbid obesity and GERD.    PLAN:  Endoscopy, possible biopsy today.    The risks of the procedure including bleeding, perforation, need for further procedure and death have been explained to the patient and Erick Becerril has expressed understanding and acceptance of them. Consent has been signed.    Nacho Salmon MD  Bariatric and Minimally Invasive General Surgery

## 2023-12-21 NOTE — ANESTHESIA POSTPROCEDURE EVALUATION
Patient: Erick Becerril    Procedure Summary       Date: 12/20/23 Room / Location: [facility] OR    Anesthesia Start: 0804 Anesthesia Stop: 0827    Procedure: EGD Diagnosis:       BMI 45.0-49.9, adult (CMS/HCC)      Morbid obesity (CMS/HCC)      Other insomnia not due to a substance or known physiological condition    Scheduled Providers: Nacho Salmon MD Responsible Provider: SHERRY Miranda    Anesthesia Type: MAC ASA Status: 2            Anesthesia Type: MAC    Vitals Value Taken Time   /72 12/20/23 0834   Temp 36.4 °C (97.5 °F) 12/20/23 0834   Pulse 92 12/20/23 0834   Resp 18 12/20/23 0834   SpO2 100 % 12/20/23 0834       Anesthesia Post Evaluation    Patient location during evaluation: PACU  Patient participation: complete - patient participated  Level of consciousness: awake  Pain score: 2  Pain management: adequate  Multimodal analgesia pain management approach  Airway patency: patent  Two or more strategies used to mitigate risk of obstructive sleep apnea  Cardiovascular status: acceptable  Respiratory status: acceptable  Hydration status: acceptable  Postoperative Nausea and Vomiting: none    No notable events documented.

## 2023-12-26 ENCOUNTER — TELEPHONE (OUTPATIENT)
Dept: SURGERY | Facility: CLINIC | Age: 28
End: 2023-12-26
Payer: COMMERCIAL

## 2023-12-26 LAB
LABORATORY COMMENT REPORT: NORMAL
PATH REPORT.COMMENTS IMP SPEC: NORMAL
PATH REPORT.FINAL DX SPEC: NORMAL
PATH REPORT.GROSS SPEC: NORMAL
PATH REPORT.TOTAL CANCER: NORMAL

## 2023-12-26 NOTE — LETTER
Terrell Becerril!    Thank you for choosing [facility] to guide you on your journey to Bariatric Surgery and a healthier you!      The following items remain for your bariatric surgery steps:    Cardiac clearance: Please call [address] [place] [address] to schedule.     Sleep Appointment: you will need a sleep study and treatment, if positive, before surgery. Please call [phone] to schedule your sleep study, and [address] [place] [address] to schedule with sleep medicine for 2 weeks after your sleep study.  If you have sleep apnea, they will order your CPAP device.  You will be required to remain compliant with therapy before surgical scheduling.    Pulmonary Appointment: your insurance dictates you will also be required to be cleared by general pulmonary medicine.  Please call [address] [place] [address] to schedule this visit. Please note: this is separate from your visit with adult sleep medicine.  If you need further clarification on this, please reach out to your navigator at [phone].    Letter of support for bariatric surgery from your primary care physician (PCP).  This is included in the Initial Visit Packet attachment.  Please print and provide to your PCP and have it faxed to [phone].    Nutrition clearance: you are scheduled for an initial visit with Amanda Woodson. You may be required to follow up prior to being cleared by the dietitian. We encourage you to continue following with your dietitian or attend Medically Supervised Weight Loss classes until you are approved for surgery.  You also will attend a New Patient Nutrition zoom class prior to being ready for surgery.    Continue Medically Supervised Weight Loss classes until approved for surgery. You must have monthly visits with the dietician until you have your bariatric surgery.     Please direct any questions you might have to your navigator, Maria Elena Bates, at [phone].  Aung,  Maria Elena Bates, RN

## 2023-12-26 NOTE — TELEPHONE ENCOUNTER
This RN called and spoke to patient in regards to clearances needed for bariatric surgery. Went over all the clearances with patient and educated patient on clearances needed and how to schedule them. Patient verbalized understanding.

## 2024-01-04 PROBLEM — Z01.818 PREOPERATIVE CLEARANCE: Status: ACTIVE | Noted: 2024-01-04

## 2024-01-04 RX ORDER — SODIUM FLUORIDE 6 MG/ML
PASTE, DENTIFRICE DENTAL
COMMUNITY
Start: 2023-07-18 | End: 2024-05-21 | Stop reason: ALTCHOICE

## 2024-01-04 RX ORDER — ASPIRIN 81 MG/1
81 TABLET ORAL
COMMUNITY
Start: 2023-05-30 | End: 2024-05-21 | Stop reason: ALTCHOICE

## 2024-01-04 RX ORDER — FLUOXETINE HYDROCHLORIDE 20 MG/1
1 CAPSULE ORAL DAILY
COMMUNITY
Start: 2023-12-12

## 2024-01-04 RX ORDER — ALPRAZOLAM 0.5 MG/1
TABLET ORAL
COMMUNITY
Start: 2023-04-27

## 2024-01-04 RX ORDER — NICOTINE 11MG/24HR
PATCH, TRANSDERMAL 24 HOURS TRANSDERMAL
COMMUNITY
Start: 2023-12-13 | End: 2024-02-29 | Stop reason: WASHOUT

## 2024-01-05 ENCOUNTER — OFFICE VISIT (OUTPATIENT)
Dept: CARDIOLOGY | Facility: CLINIC | Age: 29
End: 2024-01-05
Payer: COMMERCIAL

## 2024-01-05 VITALS
WEIGHT: 270.44 LBS | BODY MASS INDEX: 49.77 KG/M2 | SYSTOLIC BLOOD PRESSURE: 120 MMHG | HEART RATE: 99 BPM | HEIGHT: 62 IN | DIASTOLIC BLOOD PRESSURE: 81 MMHG | OXYGEN SATURATION: 97 %

## 2024-01-05 DIAGNOSIS — E66.01 MORBID OBESITY (MULTI): ICD-10-CM

## 2024-01-05 DIAGNOSIS — Z01.818 PREOPERATIVE CLEARANCE: Primary | ICD-10-CM

## 2024-01-05 DIAGNOSIS — Z01.818 PRE-OPERATIVE CLEARANCE: ICD-10-CM

## 2024-01-05 DIAGNOSIS — F51.09 OTHER INSOMNIA NOT DUE TO A SUBSTANCE OR KNOWN PHYSIOLOGICAL CONDITION: ICD-10-CM

## 2024-01-05 PROCEDURE — 99204 OFFICE O/P NEW MOD 45 MIN: CPT | Performed by: INTERNAL MEDICINE

## 2024-01-05 PROCEDURE — 93010 ELECTROCARDIOGRAM REPORT: CPT | Performed by: INTERNAL MEDICINE

## 2024-01-05 PROCEDURE — 93005 ELECTROCARDIOGRAM TRACING: CPT | Performed by: INTERNAL MEDICINE

## 2024-01-05 PROCEDURE — 1036F TOBACCO NON-USER: CPT | Performed by: INTERNAL MEDICINE

## 2024-01-05 PROCEDURE — 3008F BODY MASS INDEX DOCD: CPT | Performed by: INTERNAL MEDICINE

## 2024-01-05 PROCEDURE — 99214 OFFICE O/P EST MOD 30 MIN: CPT | Performed by: INTERNAL MEDICINE

## 2024-01-05 ASSESSMENT — ENCOUNTER SYMPTOMS
ABDOMINAL PAIN: 0
DEPRESSION: 0
COUGH: 0
CONSTIPATION: 0
DYSURIA: 0
NAUSEA: 0
MYALGIAS: 0
HEMATURIA: 0
FALLS: 0
WHEEZING: 0
FEVER: 0
HEADACHES: 0
ALTERED MENTAL STATUS: 0
BLOATING: 0
DIARRHEA: 0
CHILLS: 0
VOMITING: 0
LOSS OF SENSATION IN FEET: 0
MEMORY LOSS: 0
OCCASIONAL FEELINGS OF UNSTEADINESS: 0
HEMOPTYSIS: 0

## 2024-01-05 ASSESSMENT — COLUMBIA-SUICIDE SEVERITY RATING SCALE - C-SSRS
6. HAVE YOU EVER DONE ANYTHING, STARTED TO DO ANYTHING, OR PREPARED TO DO ANYTHING TO END YOUR LIFE?: NO
2. HAVE YOU ACTUALLY HAD ANY THOUGHTS OF KILLING YOURSELF?: NO
1. IN THE PAST MONTH, HAVE YOU WISHED YOU WERE DEAD OR WISHED YOU COULD GO TO SLEEP AND NOT WAKE UP?: NO

## 2024-01-05 ASSESSMENT — PAIN SCALES - GENERAL: PAINLEVEL: 0-NO PAIN

## 2024-01-05 NOTE — PROGRESS NOTES
Chief complaint:  New Patient Visit (For Bariatric surgery)  Consult requested by SERGO Salmon     HPI  27 yo WF w/ h/o anxiety, obesity now here for cardiology consult. No chest pain. No dyspnea at rest. No CHARLES. No orthopnea/PND. No palps. No LH/dizzy/syncope. No edema. No claudication. No cough. +occ fatigue.  ECG 1/22: SR (92), normal  ECG 1/24: SR (95), normal  CTA chest 1/22: no PE, no CAC, nl heart size, no peric eff, nl Ao  CT ab 9/23: nl Ao, nl IVC    Review of Systems   Constitutional: Negative for chills, fever and malaise/fatigue.   HENT:  Negative for hearing loss.    Eyes:  Negative for visual disturbance.   Respiratory:  Negative for cough, hemoptysis and wheezing.    Skin:  Negative for rash.   Musculoskeletal:  Negative for falls and myalgias.   Gastrointestinal:  Negative for bloating, abdominal pain, constipation, diarrhea, dysphagia, nausea and vomiting.   Genitourinary:  Negative for dysuria and hematuria.   Neurological:  Negative for headaches.   Psychiatric/Behavioral:  Negative for altered mental status, depression and memory loss.         Social History     Tobacco Use    Smoking status: Never    Smokeless tobacco: Never   Substance Use Topics    Alcohol use: Never        Family History   Problem Relation Name Age of Onset    Skin cancer Mother      No Known Problems Father      Diabetes Paternal Grandfather          No Known Allergies     Current Outpatient Medications   Medication Instructions    ALPRAZolam (Xanax) 0.5 mg tablet take 1 to 2 tablets by mouth once daily if needed for anxiety    aspirin 81 mg, oral, 2 times daily with meals    cholecalciferol (VITAMIN D3) 2,000 Units, oral, Daily    famotidine (PEPCID) 20 mg, oral, Nightly    fluoride, sodium, (Prevident 5000 Booster) 1.1 % dental paste BRUSH A PEA SIZED AMOUNT ON TEETH twice a day    FLUoxetine (PROzac) 20 mg capsule 1 capsule, oral, Daily    Vitamin D3 50 mcg (2,000 unit) capsule         Vitals:    01/05/24 1348   BP: 120/81    Pulse: 99   SpO2:         Physical Exam  Constitutional:       Appearance: Normal appearance.   HENT:      Head: Normocephalic and atraumatic.      Nose: Nose normal.   Neck:      Vascular: No carotid bruit.   Cardiovascular:      Rate and Rhythm: Normal rate and regular rhythm.      Heart sounds: No murmur heard.  Pulmonary:      Effort: Pulmonary effort is normal.      Breath sounds: Normal breath sounds.   Abdominal:      Palpations: Abdomen is soft.      Tenderness: There is no abdominal tenderness.   Musculoskeletal:      Right lower leg: No edema.      Left lower leg: No edema.   Skin:     General: Skin is warm and dry.   Neurological:      General: No focal deficit present.      Mental Status: She is alert.   Psychiatric:         Mood and Affect: Mood normal.         Judgment: Judgment normal.          Lab Results   Component Value Date    CR 0.69      HGB 12.4      K 4.0      MG      BNP            LDL 84     TSH 0.77      Assessment/Plan   27 yo WF w/ h/o anxiety, obesity. Doing well. No cardiac symptoms. ECG today normal. Low cardiac risk for bariatric surgery -> proceed as indicated. Achieving a more optimal weight may help reduce long term cardiovascular risk.  LILLIANA Granado MD

## 2024-01-08 LAB
ATRIAL RATE: 95 BPM
P AXIS: 77 DEGREES
P OFFSET: 205 MS
P ONSET: 149 MS
PR INTERVAL: 142 MS
Q ONSET: 220 MS
QRS COUNT: 15 BEATS
QRS DURATION: 82 MS
QT INTERVAL: 354 MS
QTC CALCULATION(BAZETT): 444 MS
QTC FREDERICIA: 412 MS
R AXIS: 61 DEGREES
T AXIS: 38 DEGREES
T OFFSET: 397 MS
VENTRICULAR RATE: 95 BPM

## 2024-01-10 ENCOUNTER — NUTRITION (OUTPATIENT)
Dept: SURGERY | Facility: CLINIC | Age: 29
End: 2024-01-10
Payer: COMMERCIAL

## 2024-01-10 VITALS — WEIGHT: 263.2 LBS | BODY MASS INDEX: 48.44 KG/M2 | HEIGHT: 62 IN

## 2024-01-10 DIAGNOSIS — E66.01 CLASS 3 SEVERE OBESITY DUE TO EXCESS CALORIES WITH BODY MASS INDEX (BMI) OF 45.0 TO 49.9 IN ADULT, UNSPECIFIED WHETHER SERIOUS COMORBIDITY PRESENT (MULTI): Primary | ICD-10-CM

## 2024-01-10 NOTE — PROGRESS NOTES
"PREOPERATIVE, MULTIDISCIPLINARY, MEDICALLY SUPERVISED, REDUCED CALORIE DIET, BEHAVIOR MODIFICATION AND EXERCISE PROGRAM    S:  The patient has been working on practicing pre-op behaviors.  24 hour food recall: B:  Greek yogurt L: grilled chicken salad with light ranch with craisins and almonds; D: same as lunch    O:    Vitals:    01/10/24 1400   Weight: 119 kg (263 lb 3.2 oz)    Ht:   1.575 m (5' 2\")    BMI: Body mass index is 48.14 kg/m².    Goal: 5% body weight loss over the course of program    Dietary recommendation:   1. Continue to drink at least 64 ounces of water and calorie free, non-carbonated, and decaffeinated beverages  2. Practice the 30-30-30 rule by drinking between meals.  3. Structure your meal plan - have 3 meals and 1 snack daily.  4. Have balanced meals that always contain a good source of protein.  5. Increase intake of non-starchy vegetables.  Have 5 servings fruits and vegetables daily.   6. Continue to take a multivitamin daily.  7. Increase physical activity by 10-15 minutes to an end goal of 60 minutes 5 x per week.    Group Topic: Label Reading   Behavioral recommendation: Pt is encouraged to read labels regularly to identify the key information on food label ingredient lists; such as fats, carbohydrates, protein and serving sizes.    A/P: Pt appears to have a good understanding of how to read labels for important nutritional information. Pt has a goal to read labels at home or when purchasing food at the supermarket.    Exercise:  physical therapy 2x/wk for 45 min. and walking 15-30 min. 5x/wk    Follow-up in 1 month 3/6 mswdonato Woodson, PENNIE, LD  "

## 2024-01-12 ENCOUNTER — APPOINTMENT (OUTPATIENT)
Dept: SURGERY | Facility: CLINIC | Age: 29
End: 2024-01-12
Payer: COMMERCIAL

## 2024-01-15 ENCOUNTER — TELEPHONE (OUTPATIENT)
Dept: SURGERY | Facility: CLINIC | Age: 29
End: 2024-01-15
Payer: COMMERCIAL

## 2024-01-15 NOTE — TELEPHONE ENCOUNTER
This RN called and spoke to patient in regards to clearances needed for bariatric surgery. RN went over to clearances with patient and patient verbalized understanding.

## 2024-01-15 NOTE — LETTER
Terrell Becerril!    Thank you for choosing [facility] to guide you on your journey to Bariatric Surgery and a healthier you!      The following items remain for your bariatric surgery steps:    Sleep Appointment: you will need a sleep study and treatment, if positive, before surgery. Please call [phone] to schedule your sleep study, and [phone] to schedule with sleep medicine for 2 weeks after your sleep study.  If you have sleep apnea, they will order your CPAP device.  You will be required to remain compliant with therapy before surgical scheduling.     Letter of support for bariatric surgery from your primary care physician (PCP).  This is included in the Initial Visit Packet attachment.  Please print and provide to your PCP and have it faxed to [phone].    Nutrition clearance: You may be required to follow up prior to being cleared by the dietitian. We encourage you to continue following with your dietitian or attend Medically Supervised Weight Loss classes until you are approved for surgery.  You also will attend a New Patient Nutrition zoom class prior to being ready for surgery.    Continue Medically Supervised Weight Loss classes until approved for surgery.    Please direct any questions you might have to your navigator, Maria Elena Bates, at [phone].  Kindly,  Maria Elena Bates, RN

## 2024-01-17 ENCOUNTER — OFFICE VISIT (OUTPATIENT)
Dept: ORTHOPEDIC SURGERY | Facility: CLINIC | Age: 29
End: 2024-01-17
Payer: COMMERCIAL

## 2024-01-17 DIAGNOSIS — M24.071 LOOSE BODY IN ANKLE AND FOOT JOINT, RIGHT: ICD-10-CM

## 2024-01-17 DIAGNOSIS — S93.421D SPRAIN OF DELTOID LIGAMENT OF RIGHT ANKLE, SUBSEQUENT ENCOUNTER: ICD-10-CM

## 2024-01-17 DIAGNOSIS — S93.491D SPRAIN OF ANTERIOR TALOFIBULAR LIGAMENT OF RIGHT ANKLE, SUBSEQUENT ENCOUNTER: Primary | ICD-10-CM

## 2024-01-17 DIAGNOSIS — M24.074 LOOSE BODY IN ANKLE AND FOOT JOINT, RIGHT: ICD-10-CM

## 2024-01-17 PROCEDURE — 99024 POSTOP FOLLOW-UP VISIT: CPT | Performed by: STUDENT IN AN ORGANIZED HEALTH CARE EDUCATION/TRAINING PROGRAM

## 2024-01-17 PROCEDURE — 3008F BODY MASS INDEX DOCD: CPT | Performed by: STUDENT IN AN ORGANIZED HEALTH CARE EDUCATION/TRAINING PROGRAM

## 2024-01-17 PROCEDURE — 1036F TOBACCO NON-USER: CPT | Performed by: STUDENT IN AN ORGANIZED HEALTH CARE EDUCATION/TRAINING PROGRAM

## 2024-01-17 ASSESSMENT — PAIN SCALES - GENERAL: PAINLEVEL_OUTOF10: 2

## 2024-01-17 ASSESSMENT — PAIN - FUNCTIONAL ASSESSMENT: PAIN_FUNCTIONAL_ASSESSMENT: 0-10

## 2024-01-17 ASSESSMENT — PAIN DESCRIPTION - DESCRIPTORS: DESCRIPTORS: ACHING

## 2024-01-17 NOTE — PROGRESS NOTES
ORTHOPAEDIC SURGERY OUTPATIENT PROGRESS NOTE    Chief Complaint:  Right ankle pain    History Of Present Illness  28-year-old female presents for evaluation of right ankle pain in follow-up from urgent care. Patient reports an inversion type injury to her ankle on 05/29/2023. Patient was attempting to hike and a misstep. She reports no prior history of trauma that she can recall. She experienced severe pain and had difficulty bearing weight. She was in the woods at the time hiking and had a friend assist her to her vehicle as she was unable to bear weight. She is currently reporting 7 out of 10 pain. She been taking ibuprofen. She was seen in urgent care setting where x-rays were obtained and the patient was placed into a walking boot and made nonweightbearing with crutches. She denies any associated numbness, tingling or weakness. Patient works as a  and was sent home from work today until further evaluation.     Patient is a current non-smoker, nondiabetic and does not use any anticoagulation.     Please see detailed patient history sheet for further information.     06/20/2023: Patient returns in follow-up of her right ankle injury. Patient reports that she has been essentially nonweightbearing from her prior appointment. She has not been comfortable putting weight down has been using crutches for toileting. She denies any new trauma or associated numbness, tingling weakness. She has noted that her swelling has decreased significantly. She has not yet returned to work.     07/03/2023: Patient returns for follow-up of her right ankle injury. Patient has transitioned to weightbearing in regular shoes and is currently wearing sandals with ankle brace. She reports returning to work with some residual, 3 out of 10 pain. She has not formally started physical therapy but has been doing HEP at home. Pain is worse when she has a misstep with some associated swelling. She has been using compression socks. She is  leaving Friday for vacation.     08/11/2023: Patient returns for follow-up of her right ankle injury. She has been weightbearing as tolerated in regular shoes and continuing to use her ankle brace. She is reporting continued pain in the ankle that worsens with activity and is rated a 7 out of 10 particular with turning her ankle in. She has tried physical therapy that appears to have helped some but still has pain. She has returned to work and is not reporting any finn instability at this time.     09/27/2023: Patient returns for follow-up of her right ankle injury. She is continued in physical therapy with the use of a brace. Unfortunately, she continues to complain of intermittent moderate to severe, typically 7 out of 10 pain when going downstairs. Pain is predominantly on the inside of her ankle. She does report subjective symptoms with ankle giving out or giving way.    10/26/2023: Patient returns for follow-up of her right ankle injury.  She has continued with severe ankle pain, typically 7-8 out of 10. Pain is also noted when going downstairs.  She has a clicking and catching.  She does note the ankle gives out or gives way.  She typically feels better in the brace.    11/22/2023: Patient returns s/p R ankle arthroscopy with extensive debridement, open loose body removal and medial/lateral ligament reconstruction from 11/13/2023.  Patient reports 4 out of 10 pain.  She really has had no numbness, tingling or weakness.  She reports that her ankle feels improved from prior to surgery.    12/04/2023: Patient returns as above s/p R ankle arthroscopy with extensive debridement, open loose body removal and medial/lateral ligament reconstruction from 11/13/2023.  Patient continues to report 4 out of 10 pain.  This is worse while at work when she is unable to elevate her extremity.  As per our telephone encounter, the patient got her cast wet and presents earlier than anticipated for cast exchange.  She denies any  interval trauma, she has been compliant with nonweightbearing restrictions.  She has continued on Lovenox for DVT prophylaxis.    12/20/2023: Patient returns as above s/p right ankle arthroscopy with extensive debridement, open loose body removal and medial/lateral ligament reconstruction from 11/13/2023.  Patient continues with a 4 out of 10 pain.  She has been compliant with nonweightbearing restrictions.  She has continued on Lovenox for DVT prophylaxis.    01/17/2024: Patient returns as above s/p right ankle arthroscopy with extensive debridement, open loose body removal and medial/lateral ligament reconstruction from 11/13/2023.  Patient is reporting 2 out of 10 pain.  She is steadily improving.  There has been a significant delay to initiating physical therapy due to scheduling delays with the holidays.  She is scheduled to start in approximately 2 weeks.  She has been out of the boot most of the time and wearing her lace up style ankle brace.  She has been working on ankle range of motion on her own.  She is quite happy with her progress so far.  She denies any new trauma or numbness, tingling and weakness.     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    History of neoplasm in situ of cervix    Right ankle injury 05/29/2023    RLS (restless legs syndrome)        Surgical History  Recent Surgeries in Orthopaedic Surgery            No cases to display             Social History  Social History     Socioeconomic History    Marital status:      Spouse name: None    Number of children: None    Years of education: None    Highest education level: None   Occupational History    None   Tobacco Use    Smoking status: Never    Smokeless tobacco:  Never   Vaping Use    Vaping Use: Never used   Substance and Sexual Activity    Alcohol use: Never    Drug use: Not Currently    Sexual activity: None   Other Topics Concern    None   Social History Narrative    None     Social Determinants of Health     Financial Resource Strain: Not on file   Food Insecurity: Not on file   Transportation Needs: Not on file   Physical Activity: Not on file   Stress: Not on file   Social Connections: Not on file   Intimate Partner Violence: Not on file   Housing Stability: Not on file       Family History  Family History   Problem Relation Name Age of Onset    Skin cancer Mother      No Known Problems Father      Diabetes Paternal Grandfather          Allergies  No Known Allergies    Review of Systems  REVIEW OF SYSTEMS  Constitutional: no unplanned weight loss  Psychiatric: no suicidal ideation  ENT: no vision changes, no sinus problems  Pulmonary: no shortness of breath during office visit today  Lymphatic: no enlarged lymph nodes  Cardiovascular: no chest pain or shortness of breath during office visit today  Gastrointestinal: no stomach problems  Genitourinary: no dysuria   Skin: no rashes  Endocrine: no thyroid problems  Neurological: no headache, no numbness  Hematological: no easy bruising  Musculoskeletal: Right ankle pain     Physical Exam  PHYSICAL EXAMINATION  Constitutional Exam: well developed and well nourished  Psychiatric Exam: alert and oriented, appropriate mood and behavior  Eye Exam: EOMI  Pulmonary Exam: breathing non-labored, no apparent distress  Lymphatic exam: no appreciable lymphadenopathy in the lower extremities  Cardiovascular exam: RRR to peripheral palpation, DP pulses 2+, PT 2+, toes are pink with good capillary refill, no pitting edema  Skin exam: no open lesions, rashes, abrasions or ulcerations  Neurological exam: sensation to light touch intact in both lower extremities in peripheral and dermatomal distributions (except for any abnormalities noted  in musculoskeletal exam)    Musculoskeletal exam: Right lower extremity examination.  Anteromedial and anterolateral ankle arthroscopy portals as well as medial and lateral ankle incisions well-healed.  There is no ankle effusion.  Patient has pain-free and supple ankle, subtalar and midtarsal joint range of motion.  Patient has sensation intact light touch grossly in a saphenous, sural, superficial peroneal, deep peroneal and tibial nerve distribution.  She has 5 out of 5 strength in plantarflexion, dorsiflexion and EHL.  She has 2+ DP/PT pulse palpated.  She has a negative anterior drawer and negative talar tilt test.    Last Recorded Vitals  Last menstrual period 07/29/2022.    Laboratory Results  No results found for this or any previous visit (from the past 24 hour(s)).     Radiology Results  No new imaging at this visit.    Assessment/Plan:  28-year-old female s/p R ankle arthroscopy with extensive debridement, open loose body removal and medial/lateral ligament reconstruction from 11/13/2023 who continues to recover well.  I recommend the patient continue weightbearing as tolerated in her right lower extremity and continue to wean from her walking boot and into a lace up style ankle brace.  I will provide her with an HEP for ankle and have asked her to avoid extremes of motion as she continues to recover.  I will recommend that she begin her formal outpatient physical therapy as soon as she is able.  She will continue on 40 mg Lovenox twice daily while she remains in the boot.  I will plan to see the patient back in approximate 1 month to follow her progress.  I encouraged the patient to contact the office if she develops any new pain, worsening symptoms or if she has any further questions.  Upon return, patient would not require further imaging.    Samuel Corcoran MD, ANNIA  Department of Orthopaedic Surgery  [facility]    The diagnosis and treatment plan were reviewed  with the patient. All questions were answered. The patient verbalized understanding of the treatment plan. There were no barriers to understanding identified.    Note dictated with [employer] software.  Completed without full type editing and sent to avoid delay.

## 2024-01-29 ENCOUNTER — EVALUATION (OUTPATIENT)
Dept: PHYSICAL THERAPY | Facility: HOSPITAL | Age: 29
End: 2024-01-29
Payer: COMMERCIAL

## 2024-01-29 DIAGNOSIS — M25.671 DECREASED RANGE OF MOTION OF RIGHT ANKLE: Primary | ICD-10-CM

## 2024-01-29 DIAGNOSIS — S93.491D SPRAIN OF ANTERIOR TALOFIBULAR LIGAMENT OF RIGHT ANKLE, SUBSEQUENT ENCOUNTER: ICD-10-CM

## 2024-01-29 PROCEDURE — 97110 THERAPEUTIC EXERCISES: CPT | Mod: GP | Performed by: PHYSICAL THERAPIST

## 2024-01-29 PROCEDURE — 97161 PT EVAL LOW COMPLEX 20 MIN: CPT | Mod: GP | Performed by: PHYSICAL THERAPIST

## 2024-01-29 ASSESSMENT — ENCOUNTER SYMPTOMS
LOSS OF SENSATION IN FEET: 0
OCCASIONAL FEELINGS OF UNSTEADINESS: 0
DEPRESSION: 0

## 2024-01-29 ASSESSMENT — PAIN SCALES - GENERAL: PAINLEVEL_OUTOF10: 3

## 2024-01-29 ASSESSMENT — PAIN - FUNCTIONAL ASSESSMENT: PAIN_FUNCTIONAL_ASSESSMENT: 0-10

## 2024-01-29 ASSESSMENT — PAIN DESCRIPTION - DESCRIPTORS: DESCRIPTORS: ACHING

## 2024-01-29 ASSESSMENT — PATIENT HEALTH QUESTIONNAIRE - PHQ9
SUM OF ALL RESPONSES TO PHQ9 QUESTIONS 1 AND 2: 0
1. LITTLE INTEREST OR PLEASURE IN DOING THINGS: NOT AT ALL
2. FEELING DOWN, DEPRESSED OR HOPELESS: NOT AT ALL

## 2024-01-29 NOTE — PROGRESS NOTES
Physical Therapy    Physical Therapy Evaluation and Treatment      Patient Name: Erick Becerril  MRN: [id number]  : 1995   Today's Date: 2024  Time Calculation  Start Time: 174  Stop Time: 0  Time Calculation (min): 35 min   Visit # 1    Assessment:  PT Assessment Results: Decreased strength, Decreased range of motion, Impaired balance, Decreased mobility, Pain  Rehab Prognosis: Excellent    Plan:  Treatment/Interventions: Cryotherapy, Education/ Instruction, Electrical stimulation, Hot pack, Manual therapy, Therapeutic exercises  PT Plan: Skilled PT  PT Frequency: 1 time per week  Duration: 4-6 wks  Plan of Care Agreement: Patient    Current Problem:   1. Decreased range of motion of right ankle        2. Sprain of anterior talofibular ligament of right ankle, subsequent encounter  Referral to Physical Therapy    Follow Up In Physical Therapy          Subjective      Chief complaint: Pt fell May 2023 and wore a boot and PT, steroid injections without benefit. Surgery 2023 for bone fragment removal and ligament repair. Casted for 2 weeks then boot, now able to wear lace-up brace if not snowy or ice. Has been doing ROM exercises. Occasional swelling.    Pain Better: Ice    Pain Worse: Inv/Ev    Prior level of function: independent     Current limitations: exercises    Home setup: Lives with [employer]    Work: Bank teller    Patient's goal: Walk normally, increase ROM    Precautions:  Precautions  Precautions Comment: None    Pain:  Pain Assessment  Pain Assessment: 0-10  Pain Score: 3  Pain Type: Chronic pain  Pain Location: Ankle  Pain Orientation: Right  Pain Descriptors: Aching    Objective:  Objective   Gait: antalgic right    R Ankle AROM:  PF: 30 deg (35 L)  DF: 12 deg (15 L)  INV: 17 (40 L)  EV: 8 (18)    LE Strength:  PF: 4+/5  DF: 4+/5  INV: 4/5  EV: 4/5  Knee EXT: 5/5  Knee FLEX: 5/5    Flexibility:  Hamstrings: WNL    Outcome Measures:  Other Measures  Lower Extremity Funtional Score (LEFS):  44     Treatments:  EXERCISES Date  Date  Date Date   VISIT# # # # #    REPS REPS REPS REPS          Nu-Step              Parallel bars:       DF stretch       Rockerboard A/P, M/L       4-way wobbleboard       Tandem walk       Foam: jose juan Fortune       Cone taps                            4-way ankle T-band                      Shuttle:       DLP       SLP - R, add ashish disc       TR                                                 Ankle mobs                                          CP prn              HEP: Access Code: [email or web address]  Exercises  - Supine Ankle Circles  - 2 x daily - 2 sets - 20 reps  - Long Sitting Calf Stretch with Strap  - 2 x daily - 3 sets - 30 seconds hold  - Standing Single Leg Stance with Counter Support  - 2 x daily - 1-2 reps - 60 seconds hold          Goals:  Active       Right ankle       <=1/10 ankle pain for improved walking tolerance.        Start:  01/29/24            Right ankle AROM WNL for improved gait and balance.        Start:  01/29/24            5/5 right ankle strength for improved gait and balance.        Start:  01/29/24            Improve LEFS score >=20 points for improved mobility.        Start:  01/29/24            Pt will hold right single-leg stance >=20 seconds for improved balance/proprioception.        Start:  01/29/24

## 2024-02-07 ENCOUNTER — NUTRITION (OUTPATIENT)
Dept: SURGERY | Facility: CLINIC | Age: 29
End: 2024-02-07
Payer: COMMERCIAL

## 2024-02-07 ENCOUNTER — APPOINTMENT (OUTPATIENT)
Dept: SURGERY | Facility: CLINIC | Age: 29
End: 2024-02-07
Payer: COMMERCIAL

## 2024-02-07 VITALS — BODY MASS INDEX: 48.4 KG/M2 | WEIGHT: 263 LBS | HEIGHT: 62 IN

## 2024-02-07 DIAGNOSIS — E66.01 CLASS 3 SEVERE OBESITY DUE TO EXCESS CALORIES WITH BODY MASS INDEX (BMI) OF 45.0 TO 49.9 IN ADULT, UNSPECIFIED WHETHER SERIOUS COMORBIDITY PRESENT (MULTI): Primary | ICD-10-CM

## 2024-02-07 NOTE — PROGRESS NOTES
"PREOPERATIVE, MULTIDISCIPLINARY, MEDICALLY SUPERVISED, REDUCED CALORIE DIET, BEHAVIOR MODIFICATION AND EXERCISE PROGRAM    S: The patient is working on practicing pre-op behaviors.  24 hour food recall: B: Greek yogurt; L: grilled chicken with stir taylor vegetables; D:spaghetti squash with marinara sauce and 1 Wasa cracker.    O:    Vitals:    02/07/24 1132   Weight: 119 kg (263 lb)    Ht:   1.575 m (5' 2\")     BMI: Body mass index is 48.1 kg/m².    Goal: 5% body weight loss over the course of program    Dietary recommendation:   1. Continue to drink at least 64 ounces of calorie free, non-carbonated, and decaffeinated beverages  2. Practice the 30-30-30 rule by drinking between meals.  3. Structure your meal plan - have 3 meals and 1 snack daily.  4. Have balanced meals that always contain a good source of protein.  5. Increase intake of non-starchy vegetables.  Have 5 servings fruits and vegetables daily.   6. Continue to take a multivitamin daily.  7. Increase physical activity by 10-15 minutes to an end goal of 60 minutes 5 x per week.    Group Topic: Portion Control   Behavioral recommendation: Pt is encouraged to identify and use the appropriate serving sizes from each food group.    A/P: Pt appears to have a good understanding of how to incorporate appropriate portion sizes into their daily meal pattern.  Pt has a goal to begin weighing and measuring portions until able to visualize the appropriate portion size.    Exercise: physical therapy 1x/wk for 45 min. and walking 2-3x/wk for 20 min.    Follow-up in 1 month (4/6 mswl)    Amanda Woodson RD, LD  "

## 2024-02-13 ENCOUNTER — OFFICE VISIT (OUTPATIENT)
Dept: ORTHOPEDIC SURGERY | Facility: CLINIC | Age: 29
End: 2024-02-13
Payer: COMMERCIAL

## 2024-02-13 VITALS — WEIGHT: 261 LBS | HEIGHT: 62 IN | BODY MASS INDEX: 48.03 KG/M2

## 2024-02-13 DIAGNOSIS — S93.421D SPRAIN OF DELTOID LIGAMENT OF RIGHT ANKLE, SUBSEQUENT ENCOUNTER: ICD-10-CM

## 2024-02-13 DIAGNOSIS — M25.371 OTHER INSTABILITY, RIGHT ANKLE: Primary | ICD-10-CM

## 2024-02-13 DIAGNOSIS — S93.491D SPRAIN OF ANTERIOR TALOFIBULAR LIGAMENT OF RIGHT ANKLE, SUBSEQUENT ENCOUNTER: ICD-10-CM

## 2024-02-13 PROCEDURE — 99212 OFFICE O/P EST SF 10 MIN: CPT | Performed by: STUDENT IN AN ORGANIZED HEALTH CARE EDUCATION/TRAINING PROGRAM

## 2024-02-13 PROCEDURE — 1036F TOBACCO NON-USER: CPT | Performed by: STUDENT IN AN ORGANIZED HEALTH CARE EDUCATION/TRAINING PROGRAM

## 2024-02-13 PROCEDURE — 3008F BODY MASS INDEX DOCD: CPT | Performed by: STUDENT IN AN ORGANIZED HEALTH CARE EDUCATION/TRAINING PROGRAM

## 2024-02-13 ASSESSMENT — PAIN - FUNCTIONAL ASSESSMENT: PAIN_FUNCTIONAL_ASSESSMENT: NO/DENIES PAIN

## 2024-02-14 NOTE — PROGRESS NOTES
ORTHOPAEDIC SURGERY OUTPATIENT PROGRESS NOTE    Chief Complaint:  Right ankle pain    History Of Present Illness  28-year-old female presents for evaluation of right ankle pain in follow-up from urgent care. Patient reports an inversion type injury to her ankle on 05/29/2023. Patient was attempting to hike and a misstep. She reports no prior history of trauma that she can recall. She experienced severe pain and had difficulty bearing weight. She was in the woods at the time hiking and had a friend assist her to her vehicle as she was unable to bear weight. She is currently reporting 7 out of 10 pain. She been taking ibuprofen. She was seen in urgent care setting where x-rays were obtained and the patient was placed into a walking boot and made nonweightbearing with crutches. She denies any associated numbness, tingling or weakness. Patient works as a  and was sent home from work today until further evaluation.     Patient is a current non-smoker, nondiabetic and does not use any anticoagulation.     Please see detailed patient history sheet for further information.     06/20/2023: Patient returns in follow-up of her right ankle injury. Patient reports that she has been essentially nonweightbearing from her prior appointment. She has not been comfortable putting weight down has been using crutches for toileting. She denies any new trauma or associated numbness, tingling weakness. She has noted that her swelling has decreased significantly. She has not yet returned to work.     07/03/2023: Patient returns for follow-up of her right ankle injury. Patient has transitioned to weightbearing in regular shoes and is currently wearing sandals with ankle brace. She reports returning to work with some residual, 3 out of 10 pain. She has not formally started physical therapy but has been doing HEP at home. Pain is worse when she has a misstep with some associated swelling. She has been using compression socks. She is  leaving Friday for vacation.     08/11/2023: Patient returns for follow-up of her right ankle injury. She has been weightbearing as tolerated in regular shoes and continuing to use her ankle brace. She is reporting continued pain in the ankle that worsens with activity and is rated a 7 out of 10 particular with turning her ankle in. She has tried physical therapy that appears to have helped some but still has pain. She has returned to work and is not reporting any finn instability at this time.     09/27/2023: Patient returns for follow-up of her right ankle injury. She is continued in physical therapy with the use of a brace. Unfortunately, she continues to complain of intermittent moderate to severe, typically 7 out of 10 pain when going downstairs. Pain is predominantly on the inside of her ankle. She does report subjective symptoms with ankle giving out or giving way.    10/26/2023: Patient returns for follow-up of her right ankle injury.  She has continued with severe ankle pain, typically 7-8 out of 10. Pain is also noted when going downstairs.  She has a clicking and catching.  She does note the ankle gives out or gives way.  She typically feels better in the brace.    11/22/2023: Patient returns s/p R ankle arthroscopy with extensive debridement, open loose body removal and medial/lateral ligament reconstruction from 11/13/2023.  Patient reports 4 out of 10 pain.  She really has had no numbness, tingling or weakness.  She reports that her ankle feels improved from prior to surgery.    12/04/2023: Patient returns as above s/p R ankle arthroscopy with extensive debridement, open loose body removal and medial/lateral ligament reconstruction from 11/13/2023.  Patient continues to report 4 out of 10 pain.  This is worse while at work when she is unable to elevate her extremity.  As per our telephone encounter, the patient got her cast wet and presents earlier than anticipated for cast exchange.  She denies any  interval trauma, she has been compliant with nonweightbearing restrictions.  She has continued on Lovenox for DVT prophylaxis.    12/20/2023: Patient returns as above s/p right ankle arthroscopy with extensive debridement, open loose body removal and medial/lateral ligament reconstruction from 11/13/2023.  Patient continues with a 4 out of 10 pain.  She has been compliant with nonweightbearing restrictions.  She has continued on Lovenox for DVT prophylaxis.    01/17/2024: Patient returns as above s/p right ankle arthroscopy with extensive debridement, open loose body removal and medial/lateral ligament reconstruction from 11/13/2023.  Patient is reporting 2 out of 10 pain.  She is steadily improving.  There has been a significant delay to initiating physical therapy due to scheduling delays with the holidays.  She is scheduled to start in approximately 2 weeks.  She has been out of the boot most of the time and wearing her lace up style ankle brace.  She has been working on ankle range of motion on her own.  She is quite happy with her progress so far.  She denies any new trauma or numbness, tingling and weakness.    02/13/2024: Patient returns as above for follow-up of her right ankle.  She reports minimal pain, she has been to 1 physical therapy appointment there has been some difficulty with scheduling.  Patient reports that the therapist was impressed with her overall progress to this point.  She has not had any reported instability events.  She continues to deny numbness, tingling or weakness in the RLE.     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    History of neoplasm in situ of cervix    Right ankle injury 05/29/2023    RLS (restless legs  syndrome)        Surgical History  Recent Surgeries in Orthopaedic Surgery            No cases to display             Social History  Social History     Socioeconomic History    Marital status:      Spouse name: None    Number of children: None    Years of education: None    Highest education level: None   Occupational History    None   Tobacco Use    Smoking status: Never    Smokeless tobacco: Never   Vaping Use    Vaping Use: Never used   Substance and Sexual Activity    Alcohol use: Never    Drug use: Not Currently    Sexual activity: None   Other Topics Concern    None   Social History Narrative    None     Social Determinants of Health     Financial Resource Strain: Not on file   Food Insecurity: Not on file   Transportation Needs: Not on file   Physical Activity: Not on file   Stress: Not on file   Social Connections: Not on file   Intimate Partner Violence: Not on file   Housing Stability: Not on file       Family History  Family History   Problem Relation Name Age of Onset    Skin cancer Mother      No Known Problems Father      Diabetes Paternal Grandfather          Allergies  No Known Allergies    Review of Systems  REVIEW OF SYSTEMS  Constitutional: no unplanned weight loss  Psychiatric: no suicidal ideation  ENT: no vision changes, no sinus problems  Pulmonary: no shortness of breath during office visit today  Lymphatic: no enlarged lymph nodes  Cardiovascular: no chest pain or shortness of breath during office visit today  Gastrointestinal: no stomach problems  Genitourinary: no dysuria   Skin: no rashes  Endocrine: no thyroid problems  Neurological: no headache, no numbness  Hematological: no easy bruising  Musculoskeletal: Right ankle pain     Physical Exam  PHYSICAL EXAMINATION  Constitutional Exam: well developed and well nourished  Psychiatric Exam: alert and oriented, appropriate mood and behavior  Eye Exam: EOMI  Pulmonary Exam: breathing non-labored, no apparent distress  Lymphatic exam:  "no appreciable lymphadenopathy in the lower extremities  Cardiovascular exam: RRR to peripheral palpation, DP pulses 2+, PT 2+, toes are pink with good capillary refill, no pitting edema  Skin exam: no open lesions, rashes, abrasions or ulcerations  Neurological exam: sensation to light touch intact in both lower extremities in peripheral and dermatomal distributions (except for any abnormalities noted in musculoskeletal exam)    Musculoskeletal exam: Right lower extremity examination.  Patient anterior medial and anterolateral arthroscopy portals as well as medial lateral ankle incisions well-healed, mild hypertrophic scar noted.  There is no ankle effusion.  Patient has pain-free and supple ankle, subtalar and midtarsal joint range of motion.  Patient has sensation intact light touch grossly to saphenous, sural, superficial peroneal, deep peroneal and tibial nerve distribution.  She has 5-5 strength in plantarflexion, dorsiflexion and EHL.  She has 2+ DP/PT pulse palpated.  She is a negative anterior drawer, negative talar tilt and negative dorsiflexion and external rotation stress test.    Last Recorded Vitals  Height 1.575 m (5' 2\"), weight 118 kg (261 lb), last menstrual period 07/29/2022.    Laboratory Results  No results found for this or any previous visit (from the past 24 hour(s)).     Radiology Results  No new imaging at this visit.    Assessment/Plan:  28-year-old female s/p R ankle arthroscopy with extensive debridement, open loose body removal and medial/lateral ligament reconstruction from 11/13/2023 who continues to recover well.  I recommend the patient continue weightbearing as tolerated in her right lower extremity.  I will encourage her to continue with physical therapy and will work with her on coordinating PT follow-up.  I will plan to see the patient back next week to month for repeat clinical evaluation.  Upon return, patient would not require further imaging.      Samuel Corcoran MD, " ANNIA  Department of Orthopaedic Surgery  [facility]    The diagnosis and treatment plan were reviewed with the patient. All questions were answered. The patient verbalized understanding of the treatment plan. There were no barriers to understanding identified.    Note dictated with [employer] software.  Completed without full type editing and sent to avoid delay.

## 2024-02-24 PROBLEM — Z01.419 WELL WOMAN EXAM WITH ROUTINE GYNECOLOGICAL EXAM: Status: ACTIVE | Noted: 2024-02-24

## 2024-02-24 NOTE — ASSESSMENT & PLAN NOTE
Pap is not indicated.   Regular exercise and attaining/maintaining a healthy weight is encouraged.   Adequate calcium intake with diet or supplements is encouraged.    We will notify of any abnormal results.

## 2024-02-24 NOTE — PROGRESS NOTES
Subjective   Patient ID: Erick Becerril is a 28 y.o. female who presents for Annual Exam.  She presents for annual exam. She is s/p [facility] BS on 9/20/2022. She is under care of bariatric surgeon with plans for weight loss surgery.   She has random sharp pelvic pain that lasts a few moments. This is not with a pattern. She denies issues with bowels and bladder.           Review of Systems   Constitutional:  Negative for activity change.   HENT:  Negative for congestion.    Respiratory:  Negative for apnea and cough.    Cardiovascular:  Negative for chest pain.   Gastrointestinal:  Negative for constipation and diarrhea.   Genitourinary:  Negative for hematuria and vaginal pain.   Musculoskeletal:  Negative for joint swelling.   Neurological:  Negative for dizziness.   Psychiatric/Behavioral:  Negative for agitation.        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    History of neoplasm in situ of cervix    Right ankle injury 05/29/2023    RLS (restless legs syndrome)       Past Surgical History:   Procedure Laterality Date    ANKLE ARTHROSCOPY W/ OPEN REPAIR Right 11/13/2023    right ankle arthroscopy with ligament repair    CHOLECYSTECTOMY      HYSTERECTOMY      TUBAL LIGATION      WISDOM TOOTH EXTRACTION        No Known Allergies   Current Outpatient Medications on File Prior to Visit   Medication Sig Dispense Refill    ALPRAZolam (Xanax) 0.5 mg tablet take 1 to 2 tablets by mouth once daily if needed for anxiety      aspirin 81 mg EC tablet Take 1 tablet (81 mg) by mouth 2 times a day with meals.      cholecalciferol (Vitamin D3) 50 MCG (2000 UT) tablet Take 1 tablet (2,000 Units) by mouth once daily.      famotidine (Pepcid) 20 mg tablet Take 1 tablet (20 mg) by mouth once daily  at bedtime. 90 tablet 1    fluoride, sodium, (Prevident 5000 Booster) 1.1 % dental paste BRUSH A PEA SIZED AMOUNT ON TEETH twice a day      FLUoxetine (PROzac) 20 mg capsule Take 1 capsule (20 mg) by mouth once daily.      [DISCONTINUED] Vitamin D3 50 mcg (2,000 unit) capsule        No current facility-administered medications on file prior to visit.        Objective   Physical Exam  Constitutional:       Appearance: Normal appearance.   Neck:      Thyroid: No thyromegaly.   Cardiovascular:      Rate and Rhythm: Normal rate and regular rhythm.      Heart sounds: Normal heart sounds.   Pulmonary:      Effort: Pulmonary effort is normal.      Breath sounds: Normal breath sounds.   Chest:      Chest wall: No mass.   Breasts:     Right: Normal. No inverted nipple, mass, nipple discharge or skin change.      Left: Normal. No inverted nipple, mass, nipple discharge or skin change.   Abdominal:      General: There is no distension.      Palpations: Abdomen is soft. There is no mass.      Tenderness: There is no abdominal tenderness.   Genitourinary:     General: Normal vulva.      Exam position: Lithotomy position.      Labia:         Right: No rash.         Left: No rash.       Vagina: Normal. No lesions.      Uterus: Absent.       Adnexa: Right adnexa normal and left adnexa normal.        Right: No mass or tenderness.          Left: No mass or tenderness.        Comments: Cuff is intact.  Musculoskeletal:         General: No deformity.      Cervical back: Neck supple.   Lymphadenopathy:      Cervical: No cervical adenopathy.   Skin:     General: Skin is warm and dry.      Findings: No rash.   Neurological:      General: No focal deficit present.      Mental Status: She is alert.   Psychiatric:         Mood and Affect: Mood normal.         Behavior: Behavior is cooperative.         Thought Content: Thought content normal.           Problem List Items Addressed This Visit       Well woman exam with routine gynecological  exam - Primary    Overview     She is s/p TL BS on 9/20/2022.          Current Assessment & Plan     Pap is not indicated.   Regular exercise and attaining/maintaining a healthy weight is encouraged.   Adequate calcium intake with diet or supplements is encouraged.    We will notify of any abnormal results.          Pelvic pain in female    Overview     Random pelvic pain in right or left lower abdomen without pattern. She is s/p hysterectomy with removal of tubes in 2022.         Current Assessment & Plan     Pelvic ultrasound is ordered to assess ovaries.         Relevant Orders    US PELVIS TRANSABDOMINAL WITH TRANSVAGINAL

## 2024-02-27 ENCOUNTER — TELEPHONE (OUTPATIENT)
Dept: SURGERY | Facility: CLINIC | Age: 29
End: 2024-02-27
Payer: COMMERCIAL

## 2024-02-27 NOTE — TELEPHONE ENCOUNTER
This RN attempted to call patient to go over clearances needed for bariatric surgery. RN left detailed voicemail for patient with a detailed list of clearances, and educated patient to call with questions or concerns. RN also educated patient that an updated letter will be placed in [facility].

## 2024-02-27 NOTE — LETTER
Terrell Becerril!    Thank you for choosing [facility] to guide you on your journey to Bariatric Surgery and a healthier you!      The following items remain for your bariatric surgery steps:     Sleep Appointment: you will need a sleep study and treatment, if positive, before surgery. Please call [phone] to schedule your sleep study, and [address] [place] [address] to schedule with sleep medicine for 2 weeks after your sleep study.  If you have sleep apnea, they will order your CPAP device.  You will be required to remain compliant with therapy before surgical scheduling.    Pulmonary Appointment: your insurance dictates you will also be required to be cleared by general pulmonary medicine.  Please call [address] [place] [address] to schedule this visit. Please note: this is separate from your visit with adult sleep medicine.  If you need further clarification on this, please reach out to your navigator at [phone].    Letter of support for bariatric surgery from your primary care physician (PCP).  This is included in the Initial Visit Packet attachment.  Please print and provide to your PCP and have it faxed to [phone].    Nutrition clearance: You may be required to follow up prior to being cleared by the dietitian. We encourage you to continue following with your dietitian or attend Medically Supervised Weight Loss classes until you are approved for surgery.  You also will attend a New Patient Nutrition zoom class prior to being ready for surgery.    Continue Medically Supervised Weight Loss classes until approved for surgery.    Please direct any questions you might have to your navigator, Maria Elena Bates, at [phone].   Aung,  Maria Elena Bates, MARQUES

## 2024-02-29 ENCOUNTER — DOCUMENTATION (OUTPATIENT)
Dept: PHYSICAL THERAPY | Facility: HOSPITAL | Age: 29
End: 2024-02-29

## 2024-02-29 ENCOUNTER — APPOINTMENT (OUTPATIENT)
Dept: PHYSICAL THERAPY | Facility: HOSPITAL | Age: 29
End: 2024-02-29
Payer: COMMERCIAL

## 2024-02-29 ENCOUNTER — OFFICE VISIT (OUTPATIENT)
Dept: OBSTETRICS AND GYNECOLOGY | Facility: CLINIC | Age: 29
End: 2024-02-29
Payer: COMMERCIAL

## 2024-02-29 VITALS
HEIGHT: 62 IN | DIASTOLIC BLOOD PRESSURE: 82 MMHG | BODY MASS INDEX: 49.5 KG/M2 | WEIGHT: 269 LBS | SYSTOLIC BLOOD PRESSURE: 114 MMHG

## 2024-02-29 DIAGNOSIS — R10.2 PELVIC PAIN IN FEMALE: ICD-10-CM

## 2024-02-29 DIAGNOSIS — Z01.419 WELL WOMAN EXAM WITH ROUTINE GYNECOLOGICAL EXAM: Primary | ICD-10-CM

## 2024-02-29 PROCEDURE — 3008F BODY MASS INDEX DOCD: CPT | Performed by: OBSTETRICS & GYNECOLOGY

## 2024-02-29 PROCEDURE — 99395 PREV VISIT EST AGE 18-39: CPT | Performed by: OBSTETRICS & GYNECOLOGY

## 2024-02-29 PROCEDURE — 1036F TOBACCO NON-USER: CPT | Performed by: OBSTETRICS & GYNECOLOGY

## 2024-02-29 ASSESSMENT — ENCOUNTER SYMPTOMS
ACTIVITY CHANGE: 0
COUGH: 0
CONSTIPATION: 0
JOINT SWELLING: 0
DIARRHEA: 0
HEMATURIA: 0
DIZZINESS: 0
APNEA: 0
AGITATION: 0

## 2024-02-29 NOTE — PROGRESS NOTES
Physical Therapy                 Therapy Communication Note    Patient Name: Erick Becerril  MRN: [id number]  : 1995   Today's Date: 2024     Discipline: Physical Therapy    Missed Time: Cancel    Comment: Canceled via MyChart.

## 2024-03-12 ENCOUNTER — APPOINTMENT (OUTPATIENT)
Dept: SURGERY | Facility: CLINIC | Age: 29
End: 2024-03-12
Payer: COMMERCIAL

## 2024-03-14 ENCOUNTER — APPOINTMENT (OUTPATIENT)
Dept: SURGERY | Facility: CLINIC | Age: 29
End: 2024-03-14
Payer: COMMERCIAL

## 2024-03-21 ENCOUNTER — TELEPHONE (OUTPATIENT)
Dept: SURGERY | Facility: CLINIC | Age: 29
End: 2024-03-21

## 2024-03-21 ENCOUNTER — NUTRITION (OUTPATIENT)
Dept: SURGERY | Facility: CLINIC | Age: 29
End: 2024-03-21
Payer: COMMERCIAL

## 2024-03-21 VITALS — BODY MASS INDEX: 48.76 KG/M2 | WEIGHT: 265 LBS | HEIGHT: 62 IN

## 2024-03-21 DIAGNOSIS — E66.01 CLASS 3 SEVERE OBESITY DUE TO EXCESS CALORIES WITH BODY MASS INDEX (BMI) OF 45.0 TO 49.9 IN ADULT, UNSPECIFIED WHETHER SERIOUS COMORBIDITY PRESENT (MULTI): Primary | ICD-10-CM

## 2024-03-21 NOTE — PROGRESS NOTES
"PREOPERATIVE, MULTIDISCIPLINARY, MEDICALLY SUPERVISED, REDUCED CALORIE DIET, BEHAVIOR MODIFICATION AND EXERCISE PROGRAM    S:  The patient is working on modifying her diet. 24 hour food recall: B; Premier protein shake;   L; salad with ham, cheese and Italian dressing; apples with Greek yogurt with peanut butter;   D; air fried chicken with tomato sauce, cheese and broccoli    O:    Vitals:    03/21/24 1136   Weight: 120 kg (265 lb)     Ht:   1.575 m (5' 2\")    BMI: Body mass index is 48.47 kg/m².    Goal: 5% body weight loss over the course of program    Dietary recommendation:   1. Continue to drink at least 64 ounces of water and calorie free, non-carbonated, and decaffeinated beverages  2. Practice the 30-30-30 rule by drinking between meals.  3. Structure your meal plan - have 3 meals focusing mainly on lean protein, produce.  Use condiments with 60 calories or less that are low in sugar.  4. Have balanced meals that always contain a good source of protein.  5. Increase intake of non-starchy vegetables.  Have 5 servings fruits and vegetables daily.   6. Continue to take a multivitamin daily.  7. Increase physical activity by 10-15 minutes to an end goal of 60 minutes 5 x per week.    Group Topic: Going Lean With Protein  Behavioral recommendation: Pt is encouraged to identify and recall sources of lean protein.    A/P: Pt appears to have a good understanding of how to incorporate lean protein into their daily meal pattern.  Pt has a goal to add a lean protein source to each meal.    Exercise:  3x/wk elliptical 10 min. and resistance 15-20 min. and walking 2x/wk for 15-30 min.    Follow-up in 1 month for 5/6 mswl visit.    Amanda Woodson, PENNIE, LD  "

## 2024-03-21 NOTE — TELEPHONE ENCOUNTER
This RN attempted to call patient to go over clearances needed for bariatric surgery. RN left detailed voicemail for patient with a list of clearances needed. RN educated patient in voicemail to call the bariatric office with any questions or concerns.

## 2024-03-21 NOTE — LETTER
Terrell Becerril!    Thank you for choosing [facility] to guide you on your journey to Bariatric Surgery and a healthier you!      The following items remain for your bariatric surgery steps:     Sleep Appointment: you will need a sleep study and treatment, if positive, before surgery. Please call [phone] to schedule your sleep study, and [address] [place] [address] to schedule with sleep medicine for 2 weeks after your sleep study.  If you have sleep apnea, they will order your CPAP device.  You will be required to remain compliant with therapy before surgical scheduling.    Pulmonary Appointment: your insurance dictates you will also be required to be cleared by general pulmonary medicine.  Please call [address] [place] [address] to schedule this visit. Please note: this is separate from your visit with adult sleep medicine.  If you need further clarification on this, please reach out to your navigator at [phone].    Letter of support for bariatric surgery from your primary care physician (PCP).  This is included in the Initial Visit Packet attachment.  Please print and provide to your PCP and have it faxed to [phone].    Nutrition clearance: You may be required to follow up prior to being cleared by the dietitian. We encourage you to continue following with your dietitian or attend Medically Supervised Weight Loss classes until you are approved for surgery.  You also will attend a New Patient Nutrition zoom class prior to being ready for surgery.    Continue Medically Supervised Weight Loss classes until approved for surgery.    Please direct any questions you might have to your navigator, Maria Elena Bates, at [phone].   Aung,  Maria Elena Bates, MARQUES

## 2024-03-26 ENCOUNTER — HOSPITAL ENCOUNTER (OUTPATIENT)
Dept: RADIOLOGY | Facility: CLINIC | Age: 29
Discharge: HOME | End: 2024-03-26
Payer: COMMERCIAL

## 2024-03-26 DIAGNOSIS — R10.2 PELVIC PAIN IN FEMALE: ICD-10-CM

## 2024-03-26 PROCEDURE — 76856 US EXAM PELVIC COMPLETE: CPT | Performed by: RADIOLOGY

## 2024-03-26 PROCEDURE — 76830 TRANSVAGINAL US NON-OB: CPT | Performed by: RADIOLOGY

## 2024-03-26 PROCEDURE — 76856 US EXAM PELVIC COMPLETE: CPT

## 2024-03-28 ENCOUNTER — DOCUMENTATION (OUTPATIENT)
Dept: SURGERY | Facility: CLINIC | Age: 29
End: 2024-03-28
Payer: COMMERCIAL

## 2024-04-09 ENCOUNTER — APPOINTMENT (OUTPATIENT)
Dept: ORTHOPEDIC SURGERY | Facility: CLINIC | Age: 29
End: 2024-04-09
Payer: COMMERCIAL

## 2024-04-10 ENCOUNTER — OFFICE VISIT (OUTPATIENT)
Dept: ORTHOPEDIC SURGERY | Facility: CLINIC | Age: 29
End: 2024-04-10
Payer: COMMERCIAL

## 2024-04-10 DIAGNOSIS — M25.371 OTHER INSTABILITY, RIGHT ANKLE: Primary | ICD-10-CM

## 2024-04-10 DIAGNOSIS — M25.871 ANKLE IMPINGEMENT SYNDROME, RIGHT: ICD-10-CM

## 2024-04-10 PROCEDURE — 3008F BODY MASS INDEX DOCD: CPT | Performed by: STUDENT IN AN ORGANIZED HEALTH CARE EDUCATION/TRAINING PROGRAM

## 2024-04-10 PROCEDURE — 99212 OFFICE O/P EST SF 10 MIN: CPT | Performed by: STUDENT IN AN ORGANIZED HEALTH CARE EDUCATION/TRAINING PROGRAM

## 2024-04-10 PROCEDURE — 1036F TOBACCO NON-USER: CPT | Performed by: STUDENT IN AN ORGANIZED HEALTH CARE EDUCATION/TRAINING PROGRAM

## 2024-04-10 ASSESSMENT — PAIN - FUNCTIONAL ASSESSMENT: PAIN_FUNCTIONAL_ASSESSMENT: NO/DENIES PAIN

## 2024-04-10 NOTE — PROGRESS NOTES
ORTHOPAEDIC SURGERY OUTPATIENT PROGRESS NOTE    Chief Complaint:  Right ankle pain    History Of Present Illness  28-year-old female presents for evaluation of right ankle pain in follow-up from urgent care. Patient reports an inversion type injury to her ankle on 05/29/2023. Patient was attempting to hike and a misstep. She reports no prior history of trauma that she can recall. She experienced severe pain and had difficulty bearing weight. She was in the woods at the time hiking and had a friend assist her to her vehicle as she was unable to bear weight. She is currently reporting 7 out of 10 pain. She been taking ibuprofen. She was seen in urgent care setting where x-rays were obtained and the patient was placed into a walking boot and made nonweightbearing with crutches. She denies any associated numbness, tingling or weakness. Patient works as a  and was sent home from work today until further evaluation.     Patient is a current non-smoker, nondiabetic and does not use any anticoagulation.     Please see detailed patient history sheet for further information.     06/20/2023: Patient returns in follow-up of her right ankle injury. Patient reports that she has been essentially nonweightbearing from her prior appointment. She has not been comfortable putting weight down has been using crutches for toileting. She denies any new trauma or associated numbness, tingling weakness. She has noted that her swelling has decreased significantly. She has not yet returned to work.     07/03/2023: Patient returns for follow-up of her right ankle injury. Patient has transitioned to weightbearing in regular shoes and is currently wearing sandals with ankle brace. She reports returning to work with some residual, 3 out of 10 pain. She has not formally started physical therapy but has been doing HEP at home. Pain is worse when she has a misstep with some associated swelling. She has been using compression socks. She is  leaving Friday for vacation.     08/11/2023: Patient returns for follow-up of her right ankle injury. She has been weightbearing as tolerated in regular shoes and continuing to use her ankle brace. She is reporting continued pain in the ankle that worsens with activity and is rated a 7 out of 10 particular with turning her ankle in. She has tried physical therapy that appears to have helped some but still has pain. She has returned to work and is not reporting any finn instability at this time.     09/27/2023: Patient returns for follow-up of her right ankle injury. She is continued in physical therapy with the use of a brace. Unfortunately, she continues to complain of intermittent moderate to severe, typically 7 out of 10 pain when going downstairs. Pain is predominantly on the inside of her ankle. She does report subjective symptoms with ankle giving out or giving way.    10/26/2023: Patient returns for follow-up of her right ankle injury.  She has continued with severe ankle pain, typically 7-8 out of 10. Pain is also noted when going downstairs.  She has a clicking and catching.  She does note the ankle gives out or gives way.  She typically feels better in the brace.    11/22/2023: Patient returns s/p R ankle arthroscopy with extensive debridement, open loose body removal and medial/lateral ligament reconstruction from 11/13/2023.  Patient reports 4 out of 10 pain.  She really has had no numbness, tingling or weakness.  She reports that her ankle feels improved from prior to surgery.    12/04/2023: Patient returns as above s/p R ankle arthroscopy with extensive debridement, open loose body removal and medial/lateral ligament reconstruction from 11/13/2023.  Patient continues to report 4 out of 10 pain.  This is worse while at work when she is unable to elevate her extremity.  As per our telephone encounter, the patient got her cast wet and presents earlier than anticipated for cast exchange.  She denies any  interval trauma, she has been compliant with nonweightbearing restrictions.  She has continued on Lovenox for DVT prophylaxis.    12/20/2023: Patient returns as above s/p right ankle arthroscopy with extensive debridement, open loose body removal and medial/lateral ligament reconstruction from 11/13/2023.  Patient continues with a 4 out of 10 pain.  She has been compliant with nonweightbearing restrictions.  She has continued on Lovenox for DVT prophylaxis.    01/17/2024: Patient returns as above s/p right ankle arthroscopy with extensive debridement, open loose body removal and medial/lateral ligament reconstruction from 11/13/2023.  Patient is reporting 2 out of 10 pain.  She is steadily improving.  There has been a significant delay to initiating physical therapy due to scheduling delays with the holidays.  She is scheduled to start in approximately 2 weeks.  She has been out of the boot most of the time and wearing her lace up style ankle brace.  She has been working on ankle range of motion on her own.  She is quite happy with her progress so far.  She denies any new trauma or numbness, tingling and weakness.    02/13/2024: Patient returns as above for follow-up of her right ankle.  She reports minimal pain, she has been to 1 physical therapy appointment there has been some difficulty with scheduling.  Patient reports that the therapist was impressed with her overall progress to this point.  She has not had any reported instability events.  She continues to deny numbness, tingling or weakness in the RLE.    04/10/2024: Patient returns for follow-up of her right ankle as above.  She reports no pain.  She has returned to work without restrictions.  She has not noticed any clicking or catching of her ankle.  She is not reporting any instability but does notice occasional swelling at the end of the day which improves with ice.  She denies new trauma or associated numbness, tingling or weakness.    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    History of neoplasm in situ of cervix    Right ankle injury 05/29/2023    RLS (restless legs syndrome)        Surgical History  Recent Surgeries in Orthopaedic Surgery            No cases to display             Social History  Social History     Socioeconomic History    Marital status:      Spouse name: Not on file    Number of children: Not on file    Years of education: Not on file    Highest education level: Not on file   Occupational History    Not on file   Tobacco Use    Smoking status: Never    Smokeless tobacco: Never   Vaping Use    Vaping status: Never Used   Substance and Sexual Activity    Alcohol use: Never    Drug use: Not Currently    Sexual activity: Yes     Partners: Male     Birth control/protection: Female Sterilization   Other Topics Concern    Not on file   Social History Narrative    Not on file     Social Determinants of Health     Financial Resource Strain: Not on file   Food Insecurity: Not on file   Transportation Needs: Not on file   Physical Activity: Not on file   Stress: Not on file   Social Connections: Not on file   Intimate Partner Violence: Not on file   Housing Stability: Not on file       Family History  Family History   Problem Relation Name Age of Onset    Skin cancer Mother      No Known Problems Father      Diabetes Paternal Grandfather          Allergies  No Known Allergies    Review of Systems  REVIEW OF SYSTEMS  Constitutional: no unplanned weight loss  Psychiatric: no suicidal ideation  ENT: no vision changes, no sinus problems  Pulmonary: no shortness of breath during office visit today  Lymphatic: no enlarged lymph nodes  Cardiovascular: no chest pain or shortness of  breath during office visit today  Gastrointestinal: no stomach problems  Genitourinary: no dysuria   Skin: no rashes  Endocrine: no thyroid problems  Neurological: no headache, no numbness  Hematological: no easy bruising  Musculoskeletal: Right ankle pain     Physical Exam  PHYSICAL EXAMINATION  Constitutional Exam: well developed and well nourished  Psychiatric Exam: alert and oriented, appropriate mood and behavior  Eye Exam: EOMI  Pulmonary Exam: breathing non-labored, no apparent distress  Lymphatic exam: no appreciable lymphadenopathy in the lower extremities  Cardiovascular exam: RRR to peripheral palpation, DP pulses 2+, PT 2+, toes are pink with good capillary refill, no pitting edema  Skin exam: no open lesions, rashes, abrasions or ulcerations  Neurological exam: sensation to light touch intact in both lower extremities in peripheral and dermatomal distributions (except for any abnormalities noted in musculoskeletal exam)    Musculoskeletal exam: Right lower extremity examination.  Patient anterior medial and anterolateral arthroscopy portals as well as medial lateral ankle incisions well-healed, continued note of mild hypertrophic scar noted.  There is no ankle effusion.  Patient has pain-free and supple ankle without crepitus, subtalar and midtarsal joint range of motion.  Patient has sensation intact light touch grossly to saphenous, sural, superficial peroneal, deep peroneal and tibial nerve distribution.  She has 5-5 strength in plantarflexion, dorsiflexion and EHL.  She has 2+ DP/PT pulse palpated.  She is a negative anterior drawer, negative talar tilt and negative dorsiflexion and external rotation stress test.    Last Recorded Vitals  Last menstrual period 07/29/2022, not currently breastfeeding.    Laboratory Results  No results found for this or any previous visit (from the past 24 hour(s)).     Radiology Results  No new imaging at this visit.    Assessment/Plan:  28-year-old female s/p R ankle  arthroscopy with extensive debridement, open loose body removal and medial/lateral ligament reconstruction from 11/13/2023 who continues to recover well.  I recommend the patient continue weightbearing as tolerated in her right lower extremity.  I have no restrictions for her at this time point.  I have encouraged the patient to perform scar massage and utilize compression socks for swelling.  Discussed with the patient that postoperative swelling in the operative extremity is typical up to 6 to 12 months following surgery.  I will plan to see the patient back at the 1 year follow-up rhys.  Upon return, patient would require 3 view weightbearing right ankle.    Samuel Corcoran MD, ANNIA  Department of Orthopaedic Surgery  [facility]    The diagnosis and treatment plan were reviewed with the patient. All questions were answered. The patient verbalized understanding of the treatment plan. There were no barriers to understanding identified.    Note dictated with [employer] software.  Completed without full type editing and sent to avoid delay.

## 2024-04-18 ENCOUNTER — APPOINTMENT (OUTPATIENT)
Dept: SURGERY | Facility: CLINIC | Age: 29
End: 2024-04-18
Payer: COMMERCIAL

## 2024-04-19 ENCOUNTER — TELEPHONE (OUTPATIENT)
Dept: SURGERY | Facility: CLINIC | Age: 29
End: 2024-04-19
Payer: COMMERCIAL

## 2024-04-19 NOTE — LETTER
Terrell Becerril!    Thank you for choosing [facility] to guide you on your journey to Bariatric Surgery and a healthier you!      The following items remain for your bariatric surgery steps:     Letter of support for bariatric surgery from your primary care physician (PCP).  This is included in the Initial Visit Packet attachment.  Please print and provide to your PCP and have it faxed to [phone].    Nutrition clearance: You may be required to follow up prior to being cleared by the dietitian. We encourage you to continue following with your dietitian or attend Medically Supervised Weight Loss classes until you are approved for surgery.  You also will attend a New Patient Nutrition zoom class prior to being ready for surgery.    Continue Medically Supervised Weight Loss classes until approved for surgery.    Please direct any questions you might have to your navigator, Maria Elena Bates, at [phone].   Aung,  Maria Elena Bates, RN

## 2024-04-19 NOTE — TELEPHONE ENCOUNTER
This RN attempted to call patient to go over clearances needed for bariatric surgery. RN left detailed voicemail for patient with a list of clearances needed for bariatric surgery. RN provided contact information for patient to call with any questions or concerns.

## 2024-04-26 ENCOUNTER — NUTRITION (OUTPATIENT)
Dept: SURGERY | Facility: CLINIC | Age: 29
End: 2024-04-26
Payer: COMMERCIAL

## 2024-04-26 VITALS — BODY MASS INDEX: 48.55 KG/M2 | WEIGHT: 263.8 LBS | HEIGHT: 62 IN

## 2024-04-26 DIAGNOSIS — E66.01 CLASS 3 SEVERE OBESITY DUE TO EXCESS CALORIES WITH BODY MASS INDEX (BMI) OF 45.0 TO 49.9 IN ADULT, UNSPECIFIED WHETHER SERIOUS COMORBIDITY PRESENT (MULTI): Primary | ICD-10-CM

## 2024-04-26 NOTE — PROGRESS NOTES
"PREOPERATIVE, MULTIDISCIPLINARY, MEDICALLY SUPERVISED, REDUCED CALORIE DIET, BEHAVIOR MODIFICATION AND EXERCISE PROGRAM    S:  The patient has been working on practicing pre-op behaviors.  24 hour food recall: B: Premier protein shake; L: 1/2 c, lowfat cottage cheese with berries; s: raw vegetables; D:salad with ham, cheese and boiled egg and light dressing.    O:    Vitals:    04/26/24 0837   Weight: 120 kg (263 lb 12.8 oz)    Ht:   1.575 m (5' 2\")  BMI: Body mass index is 48.25 kg/m².    Goal: 5% body weight loss over the course of program    Dietary recommendation:   1. Continue to drink at least 64 ounces of water and calorie free, non- carbonated, and caffeinated beverages  2. Practice the 30-30-30 rule by drinking between meals.  3. Structure your meal plan - have 3 meals and 1 snack daily.  4. Have balanced meals that always contain a good source of protein.  5. Increase intake of non-starchy vegetables.  Have 5 servings fruits and vegetables daily.   6. Continue to take a multivitamin daily.  7. Increase physical activity by 10-15 minutes to an end goal of 60 minutes 5 x per week.    Group Topic: Get Movin'    Behavioral recommendation: Pt is encouraged to identify and try different types of physical activity.    A/P: Pt appears to have a good understanding of how to incorporate physical activity into their daily schedule.  Pt has a goal to begin making small time commitments each week to fit in 30 minutes of exercise on as many days as possible.    Exercise: cardio and light weight 2x/wk each 20 min. and walking 2x/wk 20 min.    The patient has nutritional clearance for surgery and will follow-up in 1 month.    Amanda Woodson, PENNIE, LD    "

## 2024-05-01 ENCOUNTER — TELEMEDICINE CLINICAL SUPPORT (OUTPATIENT)
Dept: SURGERY | Facility: CLINIC | Age: 29
End: 2024-05-01
Payer: COMMERCIAL

## 2024-05-01 DIAGNOSIS — E66.01 CLASS 3 SEVERE OBESITY DUE TO EXCESS CALORIES WITH BODY MASS INDEX (BMI) OF 45.0 TO 49.9 IN ADULT, UNSPECIFIED WHETHER SERIOUS COMORBIDITY PRESENT (MULTI): Primary | ICD-10-CM

## 2024-05-01 NOTE — PROGRESS NOTES
PREOPERATIVE, MULTIDISCIPLINARY, MEDICALLY SUPERVISED, REDUCED CALORIE DIET, BEHAVIOR MODIFICATION AND EXERCISE PROGRAM    S:  The patient attended a 60 minute class today to review the 2 week pre-op diet, post-op eating guidelines and post-op vitamin/mineral supplements.     Weight: no weight was reported today.     Dietary recommendation:   1. Continue to work on eating 60-70 grams protein daily.  2. Continue to drink 64 ounces of water and calorie free, non-carbonated and decaffeinated beverages daily.  3. Continue to practice the 30-30-30 rule by drinking between meals.  4. Continue to take your MVI and discontinue taking your MVI 1 week before surgery.  5. Continue regular exercise with a longer term goal of 5 times weekly for 60 minutes.  5. Start the 2 week pre-op diet 2 weeks before your scheduled surgery date.  6. The day before surgery, drink clear fluids only.  No shakes and no red dyes.  7.  The night before surgery and 2 hours prior to surgery drink 24 oz. Gatorade or G2 without red dye, unless specified otherwise in your pre-admission instructions.    A/P: Pt appears to understand the 2 week pre-op diet.  Reviewed post-op vit/mineral supplements and post-op full liquid and the post-op eating progression for the first 6 weeks post-op.  Encouraged patient to follow-up as scheduled before and after surgery.       Amanda Woodson RD, LD

## 2024-05-02 ENCOUNTER — DOCUMENTATION (OUTPATIENT)
Dept: SURGERY | Facility: CLINIC | Age: 29
End: 2024-05-02
Payer: COMMERCIAL

## 2024-05-03 PROBLEM — E86.0 DEHYDRATION: Status: ACTIVE | Noted: 2024-05-03

## 2024-05-21 ENCOUNTER — PREP FOR PROCEDURE (OUTPATIENT)
Dept: SURGERY | Facility: CLINIC | Age: 29
End: 2024-05-21
Payer: COMMERCIAL

## 2024-05-21 ENCOUNTER — TELEPHONE (OUTPATIENT)
Dept: SURGERY | Facility: CLINIC | Age: 29
End: 2024-05-21
Payer: COMMERCIAL

## 2024-05-21 DIAGNOSIS — K44.9 HIATAL HERNIA: ICD-10-CM

## 2024-05-21 DIAGNOSIS — E66.01 MORBID OBESITY DUE TO EXCESS CALORIES (MULTI): Primary | ICD-10-CM

## 2024-05-21 RX ORDER — CELECOXIB 400 MG/1
400 CAPSULE ORAL ONCE
Status: CANCELLED | OUTPATIENT
Start: 2024-05-21 | End: 2024-05-21

## 2024-05-21 RX ORDER — SCOLOPAMINE TRANSDERMAL SYSTEM 1 MG/1
1 PATCH, EXTENDED RELEASE TRANSDERMAL ONCE
Status: CANCELLED | OUTPATIENT
Start: 2024-05-21 | End: 2024-05-21

## 2024-05-21 RX ORDER — APREPITANT 40 MG/1
40 CAPSULE ORAL ONCE
Status: CANCELLED | OUTPATIENT
Start: 2024-05-21 | End: 2024-05-21

## 2024-05-21 RX ORDER — SODIUM CHLORIDE, SODIUM LACTATE, POTASSIUM CHLORIDE, CALCIUM CHLORIDE 600; 310; 30; 20 MG/100ML; MG/100ML; MG/100ML; MG/100ML
100 INJECTION, SOLUTION INTRAVENOUS CONTINUOUS
Status: CANCELLED | OUTPATIENT
Start: 2024-05-21

## 2024-05-21 RX ORDER — HEPARIN SODIUM 5000 [USP'U]/ML
5000 INJECTION, SOLUTION INTRAVENOUS; SUBCUTANEOUS ONCE
Status: CANCELLED | OUTPATIENT
Start: 2024-05-21 | End: 2024-05-21

## 2024-05-21 RX ORDER — ACETAMINOPHEN 325 MG/1
650 TABLET ORAL ONCE
Status: CANCELLED | OUTPATIENT
Start: 2024-05-21 | End: 2024-05-21

## 2024-05-21 RX ORDER — GABAPENTIN 600 MG/1
600 TABLET ORAL ONCE
Status: CANCELLED | OUTPATIENT
Start: 2024-05-21 | End: 2024-05-21

## 2024-05-21 NOTE — TELEPHONE ENCOUNTER
Thank you for speaking with me earlier today & confirming your scheduled visit with Dr. Salmon on 5/2424 at [facility] [address] ([address]) [place] [id number] (inside [facility], main floor in the [facility]).  Please ARRIVE BY:  12:45 PM for the required Final Pre-Op Class with Dr. Salmon and Maria Elena RN Coordinator.  You will be ordered Pre-Op Labs at your appointment to be completed afterwards, there is no need to fast for the labs.  There is  Parking available with a cost of around $5.00 at the Main Entrance.  We look forward to seeing you, Adri Kaufman, [facility] Clinic Nurse.

## 2024-05-23 ENCOUNTER — NUTRITION (OUTPATIENT)
Dept: SURGERY | Facility: CLINIC | Age: 29
End: 2024-05-23
Payer: COMMERCIAL

## 2024-05-23 VITALS — BODY MASS INDEX: 48.6 KG/M2 | HEIGHT: 62 IN | WEIGHT: 264.1 LBS

## 2024-05-23 DIAGNOSIS — E66.01 CLASS 3 SEVERE OBESITY DUE TO EXCESS CALORIES WITH BODY MASS INDEX (BMI) OF 45.0 TO 49.9 IN ADULT, UNSPECIFIED WHETHER SERIOUS COMORBIDITY PRESENT (MULTI): Primary | ICD-10-CM

## 2024-05-23 NOTE — PROGRESS NOTES
"PREOPERATIVE, MULTIDISCIPLINARY, MEDICALLY SUPERVISED, REDUCED CALORIE DIET, BEHAVIOR MODIFICATION AND EXERCISE PROGRAM    S:  The patient is scheduled for gastric sleeve surgery on 6/04/24.    Planning on starting the the 2 week pre-op diet tomorrow since she was notified she could start after her in office class.   Diet will consist of a protein shake for breakfast and lunch with option of snacking on cooked/raw vegetables without added fats.  Patient will have a frozen light meal with 15 grams protein and 300 calories for dinner and considering making prepared meals, for an alternative, with 4 oz. lean protein, 1/2c. cooked starch and vegetables.  Drinking 64 ounces of water and calorie free, non-carbonated and decaffeinated fluids daily and walking regularly.    O:      Vitals:    05/23/24 1145   Weight: 120 kg (264 lb 1.6 oz)      Ht:  1.575 m (5' 2\")   BMI:  Body mass index is 48.3 kg/m².     Dietary recommendation:   1. Start the pre-op diet today as best as you can and officially start tomorrow.  2. Continue to practice the 30-30-30 rule by drinking between meals.  3. Discontinue taking your MVI 1 week before surgery.  4. The day before surgery, drink clear fluids only.  No shakes and no red dyes.  5.  The night before surgery and 2 hours prior to surgery, drink 24 oz. Gatorade or G2 if watching blood sugar, without red dye. (Unless specified otherwise at your pre-admission visit)    A/P: Pt appears to understand the 2 week pre-op diet.   Reviewed post-op vit/mineral supplements and post-op full liquid diet.    Follow-up 1-2 week post-op.  Patient will email me with any questions.     Amanda Woodson, PENNIE, LD  "

## 2024-05-24 ENCOUNTER — APPOINTMENT (OUTPATIENT)
Dept: SURGERY | Facility: CLINIC | Age: 29
End: 2024-05-24
Payer: COMMERCIAL

## 2024-05-24 ENCOUNTER — OFFICE VISIT (OUTPATIENT)
Dept: SURGERY | Facility: CLINIC | Age: 29
End: 2024-05-24
Payer: COMMERCIAL

## 2024-05-24 VITALS
WEIGHT: 267.2 LBS | DIASTOLIC BLOOD PRESSURE: 84 MMHG | OXYGEN SATURATION: 95 % | BODY MASS INDEX: 49.17 KG/M2 | SYSTOLIC BLOOD PRESSURE: 123 MMHG | HEIGHT: 62 IN | HEART RATE: 71 BPM

## 2024-05-24 DIAGNOSIS — K21.00 HIATAL HERNIA WITH GERD AND ESOPHAGITIS: ICD-10-CM

## 2024-05-24 DIAGNOSIS — K44.9 HIATAL HERNIA WITH GERD AND ESOPHAGITIS: ICD-10-CM

## 2024-05-24 DIAGNOSIS — E66.01 CLASS 3 SEVERE OBESITY DUE TO EXCESS CALORIES WITH BODY MASS INDEX (BMI) OF 45.0 TO 49.9 IN ADULT, UNSPECIFIED WHETHER SERIOUS COMORBIDITY PRESENT (MULTI): Primary | ICD-10-CM

## 2024-05-24 DIAGNOSIS — Z98.84 S/P LAPAROSCOPIC SLEEVE GASTRECTOMY: ICD-10-CM

## 2024-05-24 PROCEDURE — 1036F TOBACCO NON-USER: CPT | Performed by: SURGERY

## 2024-05-24 PROCEDURE — 99215 OFFICE O/P EST HI 40 MIN: CPT | Performed by: SURGERY

## 2024-05-24 PROCEDURE — 3008F BODY MASS INDEX DOCD: CPT | Performed by: SURGERY

## 2024-05-24 RX ORDER — OXYCODONE HYDROCHLORIDE 5 MG/1
5 TABLET ORAL EVERY 6 HOURS PRN
Qty: 24 TABLET | Refills: 0 | Status: SHIPPED | OUTPATIENT
Start: 2024-05-24 | End: 2024-06-11

## 2024-05-24 RX ORDER — ONDANSETRON 4 MG/1
4 TABLET, ORALLY DISINTEGRATING ORAL EVERY 6 HOURS PRN
Qty: 15 TABLET | Refills: 1 | Status: SHIPPED | OUTPATIENT
Start: 2024-05-24

## 2024-05-24 RX ORDER — OMEPRAZOLE 40 MG/1
40 CAPSULE, DELAYED RELEASE ORAL
Qty: 30 CAPSULE | Refills: 5 | Status: SHIPPED | OUTPATIENT
Start: 2024-05-24

## 2024-05-24 ASSESSMENT — PAIN SCALES - GENERAL: PAINLEVEL: 0-NO PAIN

## 2024-05-24 NOTE — H&P (VIEW-ONLY)
BARIATRIC SURGERY PREOPERATIVE VISIT    Date: 05/24/24  Time: 1:33 PM    Name: Erick Becerril    MRN: [id number]    This is a 29 y.o. y.o. female with morbid obesity (Body mass index is 48.87 kg/m².) who plans to undergo Laparoscopic sleeve gastrectomy  31359 and Hiatal hernia repair 96929 surgery. They have completed a rigorous preoperative medical work-up and bariatric surgery educational program.     PMH:   Patient Active Problem List   Diagnosis    Cystic fibrosis carrier    Hand, foot and mouth disease    Low TSH level    Wound infection    Ankle weakness    Bariatric surgery status    Class 3 severe obesity due to excess calories with body mass index (BMI) of 45.0 to 49.9 in adult (Multi)    Furuncle of umbilicus    Gait abnormality    Hypersomnia with sleep apnea    Inadequate sleep hygiene    Injury of peroneal tendon of right foot    Other insomnia not due to a substance or known physiological condition    Posterior tibial tendinitis of right lower extremity    Restless leg syndrome    Seasonal allergies    Sprain of anterior talofibular ligament of right ankle    Ankle impingement syndrome, right    Anemia    Preoperative clearance    Decreased range of motion of right ankle    Well woman exam with routine gynecological exam    Pelvic pain in female    Dehydration    Morbid obesity due to excess calories (Multi)    Hiatal hernia with GERD and esophagitis       PSH:   Past Surgical History:   Procedure Laterality Date    ANKLE ARTHROSCOPY W/ OPEN REPAIR Right 11/13/2023    right ankle arthroscopy with ligament repair    CHOLECYSTECTOMY      HYSTERECTOMY      TUBAL LIGATION      WISDOM TOOTH EXTRACTION         Social hx:   Social History     Socioeconomic History    Marital status:      Spouse name: Not on file    Number of children: Not on file    Years of education: Not on file    Highest education level: Not on file   Occupational History    Not on file   Tobacco Use    Smoking status: Never      Passive exposure: Never    Smokeless tobacco: Never    Tobacco comments:     5/21/24:  PT VERBALLY VERIFIES [facility] LPN   Vaping Use    Vaping status: Never Used   Substance and Sexual Activity    Alcohol use: Not Currently     Comment: 5/21/24:  PT VERBALLY VERIFIES [facility] L[facility]    Drug use: Never     Comment: 5/21/24:  PT VERBALLY VERIFIES [facility]    Sexual activity: Yes     Partners: Male     Birth control/protection: Female Sterilization, None     Comment: Hysterectomy   Other Topics Concern    Not on file   Social History Narrative    Not on file     Social Determinants of Health     Financial Resource Strain: Not on file   Food Insecurity: Not on file   Transportation Needs: Not on file   Physical Activity: Not on file   Stress: Not on file   Social Connections: Not on file   Intimate Partner Violence: Not on file   Housing Stability: Not on file       Initial weight: 261 lbs  Current weight:   Vitals:    05/24/24 1303   Weight: 121 kg (267 lb 3.2 oz)       Preop Clearances:  Cardiac: Cleared  Pulmonary: Cleared  Psych: Cleared    History of Clotting Disorder: none  Anticoagulation plan: na    Other: na    Sleep Study: No apnea    EGD: Hiatal hernia:        MEDICATIONS:  Prior to Admission Medications:    Current Outpatient Medications:     ALPRAZolam (Xanax) 0.5 mg tablet, take 1 to 2 tablets by mouth once daily if needed for anxiety, Disp: , Rfl:     cholecalciferol (Vitamin D3) 50 MCG (2000 UT) tablet, Take 1 tablet (2,000 Units) by mouth once daily., Disp: , Rfl:     famotidine (Pepcid) 20 mg tablet, Take 1 tablet (20 mg) by mouth once daily at bedtime., Disp: 90 tablet, Rfl: 1    FLUoxetine (PROzac) 20 mg capsule, Take 1 capsule (20 mg) by mouth once daily., Disp: , Rfl:     omeprazole (PriLOSEC) 40 mg DR capsule, Take 1 capsule (40 mg) by mouth once daily in the morning. Take before meals. Do not crush or chew. Open capsule, sprinkle beads on SF jello, pudding or applesauce., Disp: 30 capsule, Rfl: 5     ondansetron ODT (Zofran-ODT) 4 mg disintegrating tablet, Take 1 tablet (4 mg) by mouth every 6 hours if needed for nausea or vomiting., Disp: 15 tablet, Rfl: 1    oxyCODONE (Roxicodone) 5 mg immediate release tablet, Take 1 tablet (5 mg) by mouth every 6 hours if needed for severe pain (7 - 10) or moderate pain (4 - 6) for up to 6 days., Disp: 24 tablet, Rfl: 0    ALLERGIES:  No Known Allergies    REVIEW OF SYSTEMS:  GENERAL: Obese. Negative for malaise, significant weight loss and fever  NECK: Negative for lumps, goiter, pain and significant neck swelling  RESPIRATORY: Negative for cough, wheezing or shortness of breath.  CARDIOVASCULAR: Negative for chest pain, leg swelling or palpitations.  GI: Negative for abdominal discomfort, blood in stools or black stools or change in bowel habits  : No history of dysuria, frequency or incontinence  MUSCULOSKELETAL: Negative for joint pain or swelling, back pain or muscle pain.  SKIN: Negative for lesions, rash, and itching.  PSYCH: Negative for sleep disturbance, mood disorder and recent psychosocial stressors.  ENDOCRINE: Negative for cold or heat intolerance, polyuria, polydipsia and goiter.    PHYSICAL EXAM:  Visit Vitals  /84 (BP Location: Right arm, Patient Position: Sitting, BP Cuff Size: Large adult)   Pulse 71       General appearance: obese  Skin: warm, no erythema or rashes  Lungs: clear to percussion and auscultation  Heart: regular rhythm and S1, S2 normal  Abdomen: soft, non-tender, no masses, no organomegaly  Extremities: Normal exam of the extremities. No swelling or pain.    No results found for this or any previous visit (from the past 24 hour(s)).    IMPRESSION:  Erick Becerril is a 29 y.o. y.o. female with a BMI of Body mass index is 48.87 kg/m²..    They have been preoperatively evaluated and deemed to be an appropriate candidate for bariatric surgery.  Surgery Type: Laparoscopic sleeve gastrectomy  89839 and Hiatal hernia repair 99348    All  testing reviewed.  All clearances contained.    PLAN:    VTE risk calculator score of 0.16%, so the patient will require none weeks of extended VTE prophylaxis with Lovenox subcutaneous.    The risks of Laparoscopic sleeve gastrectomy  84761 and Hiatal hernia repair 42927 surgery including bleeding, leak, wound infection, dehydration, ulcers, internal hernia, DVT/PE, prolonged nausea/vomiting, incomplete resolution of associated medical conditions, reflux, weight regain, vitamin/mineral deficiencies, and death have been explained to the patient and Erick Becerril has expressed understanding and acceptance of them.    The benefits of the above surgery including weight loss, improvement/resolution of associated medical and mental health conditions, improved mobility, and decreased mortality have been explained the the patient and Erick Becerril has expressed understanding and acceptance of them.    Operative and blood transfusion consent forms were signed by the patient and witnessed today.    Prescriptions for all required post-operative home medications were sent to the [facility] pharmacy today and will be delivered to the patient's bedside on the day of discharge.    Further education was provided on day of surgery instructions and what to expect from the inpatient admission after surgery.    Nacho Salmon MD  Bariatric and Minimally Invasive General Surgery

## 2024-05-24 NOTE — PROGRESS NOTES
BARIATRIC SURGERY PREOPERATIVE VISIT    Date: 05/24/24  Time: 1:33 PM    Name: Erick Becerril    MRN: [id number]    This is a 29 y.o. y.o. female with morbid obesity (Body mass index is 48.87 kg/m².) who plans to undergo Laparoscopic sleeve gastrectomy  45946 and Hiatal hernia repair 70723 surgery. They have completed a rigorous preoperative medical work-up and bariatric surgery educational program.     PMH:   Patient Active Problem List   Diagnosis    Cystic fibrosis carrier    Hand, foot and mouth disease    Low TSH level    Wound infection    Ankle weakness    Bariatric surgery status    Class 3 severe obesity due to excess calories with body mass index (BMI) of 45.0 to 49.9 in adult (Multi)    Furuncle of umbilicus    Gait abnormality    Hypersomnia with sleep apnea    Inadequate sleep hygiene    Injury of peroneal tendon of right foot    Other insomnia not due to a substance or known physiological condition    Posterior tibial tendinitis of right lower extremity    Restless leg syndrome    Seasonal allergies    Sprain of anterior talofibular ligament of right ankle    Ankle impingement syndrome, right    Anemia    Preoperative clearance    Decreased range of motion of right ankle    Well woman exam with routine gynecological exam    Pelvic pain in female    Dehydration    Morbid obesity due to excess calories (Multi)    Hiatal hernia with GERD and esophagitis       PSH:   Past Surgical History:   Procedure Laterality Date    ANKLE ARTHROSCOPY W/ OPEN REPAIR Right 11/13/2023    right ankle arthroscopy with ligament repair    CHOLECYSTECTOMY      HYSTERECTOMY      TUBAL LIGATION      WISDOM TOOTH EXTRACTION         Social hx:   Social History     Socioeconomic History    Marital status:      Spouse name: Not on file    Number of children: Not on file    Years of education: Not on file    Highest education level: Not on file   Occupational History    Not on file   Tobacco Use    Smoking status: Never      Passive exposure: Never    Smokeless tobacco: Never    Tobacco comments:     5/21/24:  PT VERBALLY VERIFIES [facility] LPN   Vaping Use    Vaping status: Never Used   Substance and Sexual Activity    Alcohol use: Not Currently     Comment: 5/21/24:  PT VERBALLY VERIFIES [facility] LPN    Drug use: Never     Comment: 5/21/24:  PT VERBALLY VERIFIES [facility] LPN    Sexual activity: Yes     Partners: Male     Birth control/protection: Female Sterilization, None     Comment: Hysterectomy   Other Topics Concern    Not on file   Social History Narrative    Not on file     Social Determinants of Health     Financial Resource Strain: Not on file   Food Insecurity: Not on file   Transportation Needs: Not on file   Physical Activity: Not on file   Stress: Not on file   Social Connections: Not on file   Intimate Partner Violence: Not on file   Housing Stability: Not on file       Initial weight: 261 lbs  Current weight:   Vitals:    05/24/24 1303   Weight: 121 kg (267 lb 3.2 oz)       Preop Clearances:  Cardiac: Cleared  Pulmonary: Cleared  Psych: Cleared    History of Clotting Disorder: none  Anticoagulation plan: na    Other: na    Sleep Study: No apnea    EGD: Hiatal hernia:        MEDICATIONS:  Prior to Admission Medications:    Current Outpatient Medications:     ALPRAZolam (Xanax) 0.5 mg tablet, take 1 to 2 tablets by mouth once daily if needed for anxiety, Disp: , Rfl:     cholecalciferol (Vitamin D3) 50 MCG (2000 UT) tablet, Take 1 tablet (2,000 Units) by mouth once daily., Disp: , Rfl:     famotidine (Pepcid) 20 mg tablet, Take 1 tablet (20 mg) by mouth once daily at bedtime., Disp: 90 tablet, Rfl: 1    FLUoxetine (PROzac) 20 mg capsule, Take 1 capsule (20 mg) by mouth once daily., Disp: , Rfl:     omeprazole (PriLOSEC) 40 mg DR capsule, Take 1 capsule (40 mg) by mouth once daily in the morning. Take before meals. Do not crush or chew. Open capsule, sprinkle beads on SF jello, pudding or applesauce., Disp: 30 capsule, Rfl: 5     ondansetron ODT (Zofran-ODT) 4 mg disintegrating tablet, Take 1 tablet (4 mg) by mouth every 6 hours if needed for nausea or vomiting., Disp: 15 tablet, Rfl: 1    oxyCODONE (Roxicodone) 5 mg immediate release tablet, Take 1 tablet (5 mg) by mouth every 6 hours if needed for severe pain (7 - 10) or moderate pain (4 - 6) for up to 6 days., Disp: 24 tablet, Rfl: 0    ALLERGIES:  No Known Allergies    REVIEW OF SYSTEMS:  GENERAL: Obese. Negative for malaise, significant weight loss and fever  NECK: Negative for lumps, goiter, pain and significant neck swelling  RESPIRATORY: Negative for cough, wheezing or shortness of breath.  CARDIOVASCULAR: Negative for chest pain, leg swelling or palpitations.  GI: Negative for abdominal discomfort, blood in stools or black stools or change in bowel habits  : No history of dysuria, frequency or incontinence  MUSCULOSKELETAL: Negative for joint pain or swelling, back pain or muscle pain.  SKIN: Negative for lesions, rash, and itching.  PSYCH: Negative for sleep disturbance, mood disorder and recent psychosocial stressors.  ENDOCRINE: Negative for cold or heat intolerance, polyuria, polydipsia and goiter.    PHYSICAL EXAM:  Visit Vitals  /84 (BP Location: Right arm, Patient Position: Sitting, BP Cuff Size: Large adult)   Pulse 71       General appearance: obese  Skin: warm, no erythema or rashes  Lungs: clear to percussion and auscultation  Heart: regular rhythm and S1, S2 normal  Abdomen: soft, non-tender, no masses, no organomegaly  Extremities: Normal exam of the extremities. No swelling or pain.    No results found for this or any previous visit (from the past 24 hour(s)).    IMPRESSION:  Erick Becerril is a 29 y.o. y.o. female with a BMI of Body mass index is 48.87 kg/m²..    They have been preoperatively evaluated and deemed to be an appropriate candidate for bariatric surgery.  Surgery Type: Laparoscopic sleeve gastrectomy  91344 and Hiatal hernia repair 67642    All  testing reviewed.  All clearances contained.    PLAN:    VTE risk calculator score of 0.16%, so the patient will require none weeks of extended VTE prophylaxis with Lovenox subcutaneous.    The risks of Laparoscopic sleeve gastrectomy  70718 and Hiatal hernia repair 58795 surgery including bleeding, leak, wound infection, dehydration, ulcers, internal hernia, DVT/PE, prolonged nausea/vomiting, incomplete resolution of associated medical conditions, reflux, weight regain, vitamin/mineral deficiencies, and death have been explained to the patient and Erick Becerril has expressed understanding and acceptance of them.    The benefits of the above surgery including weight loss, improvement/resolution of associated medical and mental health conditions, improved mobility, and decreased mortality have been explained the the patient and Erick Becerril has expressed understanding and acceptance of them.    Operative and blood transfusion consent forms were signed by the patient and witnessed today.    Prescriptions for all required post-operative home medications were sent to the [facility]patient pharmacy today and will be delivered to the patient's bedside on the day of discharge.    Further education was provided on day of surgery instructions and what to expect from the inpatient admission after surgery.    Nacho Salmon MD  Bariatric and Minimally Invasive General Surgery

## 2024-05-29 ENCOUNTER — ANESTHESIA EVENT (OUTPATIENT)
Dept: OPERATING ROOM | Facility: HOSPITAL | Age: 29
End: 2024-05-29
Payer: COMMERCIAL

## 2024-06-03 PROCEDURE — RXMED WILLOW AMBULATORY MEDICATION CHARGE

## 2024-06-04 ENCOUNTER — APPOINTMENT (OUTPATIENT)
Dept: RADIOLOGY | Facility: HOSPITAL | Age: 29
End: 2024-06-04
Payer: COMMERCIAL

## 2024-06-04 ENCOUNTER — HOSPITAL ENCOUNTER (INPATIENT)
Facility: HOSPITAL | Age: 29
LOS: 1 days | Discharge: HOME | End: 2024-06-05
Attending: SURGERY | Admitting: SURGERY
Payer: COMMERCIAL

## 2024-06-04 ENCOUNTER — ANESTHESIA (OUTPATIENT)
Dept: OPERATING ROOM | Facility: HOSPITAL | Age: 29
End: 2024-06-04
Payer: COMMERCIAL

## 2024-06-04 DIAGNOSIS — K21.00 HIATAL HERNIA WITH GERD AND ESOPHAGITIS: ICD-10-CM

## 2024-06-04 DIAGNOSIS — E66.01 MORBID OBESITY DUE TO EXCESS CALORIES (MULTI): Primary | ICD-10-CM

## 2024-06-04 DIAGNOSIS — Z98.84 S/P LAPAROSCOPIC SLEEVE GASTRECTOMY: ICD-10-CM

## 2024-06-04 DIAGNOSIS — K44.9 HIATAL HERNIA WITH GERD AND ESOPHAGITIS: ICD-10-CM

## 2024-06-04 DIAGNOSIS — K44.9 HIATAL HERNIA: ICD-10-CM

## 2024-06-04 LAB
ABO GROUP (TYPE) IN BLOOD: NORMAL
ABO GROUP (TYPE) IN BLOOD: NORMAL
ANION GAP SERPL CALC-SCNC: 18 MMOL/L (ref 10–20)
ANTIBODY SCREEN: NORMAL
BUN SERPL-MCNC: 12 MG/DL (ref 6–23)
CALCIUM SERPL-MCNC: 9.7 MG/DL (ref 8.6–10.3)
CHLORIDE SERPL-SCNC: 103 MMOL/L (ref 98–107)
CO2 SERPL-SCNC: 22 MMOL/L (ref 21–32)
CREAT SERPL-MCNC: 0.72 MG/DL (ref 0.5–1.05)
EGFRCR SERPLBLD CKD-EPI 2021: >90 ML/MIN/1.73M*2
ERYTHROCYTE [DISTWIDTH] IN BLOOD BY AUTOMATED COUNT: 13.3 % (ref 11.5–14.5)
GLUCOSE SERPL-MCNC: 103 MG/DL (ref 74–99)
HCT VFR BLD AUTO: 37.2 % (ref 36–46)
HGB BLD-MCNC: 12.2 G/DL (ref 12–16)
MCH RBC QN AUTO: 27.9 PG (ref 26–34)
MCHC RBC AUTO-ENTMCNC: 32.8 G/DL (ref 32–36)
MCV RBC AUTO: 85 FL (ref 80–100)
NRBC BLD-RTO: 0 /100 WBCS (ref 0–0)
PLATELET # BLD AUTO: 328 X10*3/UL (ref 150–450)
POTASSIUM SERPL-SCNC: 4 MMOL/L (ref 3.5–5.3)
RBC # BLD AUTO: 4.38 X10*6/UL (ref 4–5.2)
RH FACTOR (ANTIGEN D): NORMAL
RH FACTOR (ANTIGEN D): NORMAL
SODIUM SERPL-SCNC: 139 MMOL/L (ref 136–145)
WBC # BLD AUTO: 7.1 X10*3/UL (ref 4.4–11.3)

## 2024-06-04 PROCEDURE — 2500000004 HC RX 250 GENERAL PHARMACY W/ HCPCS (ALT 636 FOR OP/ED): Performed by: NURSE ANESTHETIST, CERTIFIED REGISTERED

## 2024-06-04 PROCEDURE — 2500000005 HC RX 250 GENERAL PHARMACY W/O HCPCS: Performed by: SURGERY

## 2024-06-04 PROCEDURE — 0DB64Z3 EXCISION OF STOMACH, PERCUTANEOUS ENDOSCOPIC APPROACH, VERTICAL: ICD-10-PCS | Performed by: SURGERY

## 2024-06-04 PROCEDURE — 36415 COLL VENOUS BLD VENIPUNCTURE: CPT | Performed by: SURGERY

## 2024-06-04 PROCEDURE — 88307 TISSUE EXAM BY PATHOLOGIST: CPT | Mod: TC,GEALAB | Performed by: SURGERY

## 2024-06-04 PROCEDURE — 80048 BASIC METABOLIC PNL TOTAL CA: CPT | Performed by: ANESTHESIOLOGY

## 2024-06-04 PROCEDURE — 2780000003 HC OR 278 NO HCPCS: Performed by: SURGERY

## 2024-06-04 PROCEDURE — 86901 BLOOD TYPING SEROLOGIC RH(D): CPT | Performed by: ANESTHESIOLOGY

## 2024-06-04 PROCEDURE — 2720000007 HC OR 272 NO HCPCS: Performed by: SURGERY

## 2024-06-04 PROCEDURE — 0BQT4ZZ REPAIR DIAPHRAGM, PERCUTANEOUS ENDOSCOPIC APPROACH: ICD-10-PCS | Performed by: SURGERY

## 2024-06-04 PROCEDURE — 3600000004 HC OR TIME - INITIAL BASE CHARGE - PROCEDURE LEVEL FOUR: Performed by: SURGERY

## 2024-06-04 PROCEDURE — 1200000002 HC GENERAL ROOM WITH TELEMETRY DAILY

## 2024-06-04 PROCEDURE — 9420000001 HC RT PATIENT EDUCATION 5 MIN

## 2024-06-04 PROCEDURE — 2500000004 HC RX 250 GENERAL PHARMACY W/ HCPCS (ALT 636 FOR OP/ED): Performed by: SURGERY

## 2024-06-04 PROCEDURE — 2500000001 HC RX 250 WO HCPCS SELF ADMINISTERED DRUGS (ALT 637 FOR MEDICARE OP): Performed by: SURGERY

## 2024-06-04 PROCEDURE — 43281 LAP PARAESOPHAG HERN REPAIR: CPT | Performed by: SURGERY

## 2024-06-04 PROCEDURE — 3600000009 HC OR TIME - EACH INCREMENTAL 1 MINUTE - PROCEDURE LEVEL FOUR: Performed by: SURGERY

## 2024-06-04 PROCEDURE — 36415 COLL VENOUS BLD VENIPUNCTURE: CPT | Performed by: ANESTHESIOLOGY

## 2024-06-04 PROCEDURE — 2500000004 HC RX 250 GENERAL PHARMACY W/ HCPCS (ALT 636 FOR OP/ED): Performed by: ANESTHESIOLOGY

## 2024-06-04 PROCEDURE — 2500000002 HC RX 250 W HCPCS SELF ADMINISTERED DRUGS (ALT 637 FOR MEDICARE OP, ALT 636 FOR OP/ED): Performed by: SURGERY

## 2024-06-04 PROCEDURE — 43775 LAP SLEEVE GASTRECTOMY: CPT | Performed by: SURGERY

## 2024-06-04 PROCEDURE — 71045 X-RAY EXAM CHEST 1 VIEW: CPT | Performed by: RADIOLOGY

## 2024-06-04 PROCEDURE — 85027 COMPLETE CBC AUTOMATED: CPT | Performed by: ANESTHESIOLOGY

## 2024-06-04 PROCEDURE — 3700000001 HC GENERAL ANESTHESIA TIME - INITIAL BASE CHARGE: Performed by: SURGERY

## 2024-06-04 PROCEDURE — 7100000001 HC RECOVERY ROOM TIME - INITIAL BASE CHARGE: Performed by: SURGERY

## 2024-06-04 PROCEDURE — 7100000002 HC RECOVERY ROOM TIME - EACH INCREMENTAL 1 MINUTE: Performed by: SURGERY

## 2024-06-04 PROCEDURE — 3700000002 HC GENERAL ANESTHESIA TIME - EACH INCREMENTAL 1 MINUTE: Performed by: SURGERY

## 2024-06-04 PROCEDURE — 2500000005 HC RX 250 GENERAL PHARMACY W/O HCPCS: Performed by: NURSE ANESTHETIST, CERTIFIED REGISTERED

## 2024-06-04 PROCEDURE — 71045 X-RAY EXAM CHEST 1 VIEW: CPT

## 2024-06-04 PROCEDURE — 96372 THER/PROPH/DIAG INJ SC/IM: CPT | Performed by: SURGERY

## 2024-06-04 RX ORDER — GABAPENTIN 300 MG/1
600 CAPSULE ORAL ONCE
Status: COMPLETED | OUTPATIENT
Start: 2024-06-04 | End: 2024-06-04

## 2024-06-04 RX ORDER — PHENYLEPHRINE HCL IN 0.9% NACL 0.4MG/10ML
SYRINGE (ML) INTRAVENOUS AS NEEDED
Status: DISCONTINUED | OUTPATIENT
Start: 2024-06-04 | End: 2024-06-04

## 2024-06-04 RX ORDER — LIDOCAINE HCL/PF 100 MG/5ML
SYRINGE (ML) INTRAVENOUS AS NEEDED
Status: DISCONTINUED | OUTPATIENT
Start: 2024-06-04 | End: 2024-06-04

## 2024-06-04 RX ORDER — ENOXAPARIN SODIUM 100 MG/ML
40 INJECTION SUBCUTANEOUS EVERY 12 HOURS
Status: DISCONTINUED | OUTPATIENT
Start: 2024-06-04 | End: 2024-06-05 | Stop reason: HOSPADM

## 2024-06-04 RX ORDER — SIMETHICONE 80 MG
80 TABLET,CHEWABLE ORAL EVERY 4 HOURS PRN
Status: DISCONTINUED | OUTPATIENT
Start: 2024-06-04 | End: 2024-06-05 | Stop reason: HOSPADM

## 2024-06-04 RX ORDER — ROCURONIUM BROMIDE 10 MG/ML
INJECTION, SOLUTION INTRAVENOUS AS NEEDED
Status: DISCONTINUED | OUTPATIENT
Start: 2024-06-04 | End: 2024-06-04

## 2024-06-04 RX ORDER — PANTOPRAZOLE SODIUM 40 MG/10ML
40 INJECTION, POWDER, LYOPHILIZED, FOR SOLUTION INTRAVENOUS
Status: DISCONTINUED | OUTPATIENT
Start: 2024-06-05 | End: 2024-06-05 | Stop reason: HOSPADM

## 2024-06-04 RX ORDER — ONDANSETRON HYDROCHLORIDE 2 MG/ML
4 INJECTION, SOLUTION INTRAVENOUS ONCE AS NEEDED
Status: DISCONTINUED | OUTPATIENT
Start: 2024-06-04 | End: 2024-06-04 | Stop reason: HOSPADM

## 2024-06-04 RX ORDER — ESOMEPRAZOLE MAGNESIUM 40 MG/1
40 GRANULE, DELAYED RELEASE ORAL
Status: DISCONTINUED | OUTPATIENT
Start: 2024-06-05 | End: 2024-06-05 | Stop reason: HOSPADM

## 2024-06-04 RX ORDER — SODIUM CHLORIDE, SODIUM LACTATE, POTASSIUM CHLORIDE, CALCIUM CHLORIDE 600; 310; 30; 20 MG/100ML; MG/100ML; MG/100ML; MG/100ML
100 INJECTION, SOLUTION INTRAVENOUS CONTINUOUS
Status: DISCONTINUED | OUTPATIENT
Start: 2024-06-04 | End: 2024-06-04

## 2024-06-04 RX ORDER — KETOROLAC TROMETHAMINE 15 MG/ML
15 INJECTION, SOLUTION INTRAMUSCULAR; INTRAVENOUS EVERY 6 HOURS SCHEDULED
Status: DISCONTINUED | OUTPATIENT
Start: 2024-06-04 | End: 2024-06-05 | Stop reason: HOSPADM

## 2024-06-04 RX ORDER — PANTOPRAZOLE SODIUM 40 MG/1
40 TABLET, DELAYED RELEASE ORAL
Status: DISCONTINUED | OUTPATIENT
Start: 2024-06-05 | End: 2024-06-05 | Stop reason: HOSPADM

## 2024-06-04 RX ORDER — ONDANSETRON HYDROCHLORIDE 2 MG/ML
INJECTION, SOLUTION INTRAVENOUS AS NEEDED
Status: DISCONTINUED | OUTPATIENT
Start: 2024-06-04 | End: 2024-06-04

## 2024-06-04 RX ORDER — APREPITANT 40 MG/1
40 CAPSULE ORAL ONCE
Status: COMPLETED | OUTPATIENT
Start: 2024-06-04 | End: 2024-06-04

## 2024-06-04 RX ORDER — FENTANYL CITRATE 50 UG/ML
INJECTION, SOLUTION INTRAMUSCULAR; INTRAVENOUS AS NEEDED
Status: DISCONTINUED | OUTPATIENT
Start: 2024-06-04 | End: 2024-06-04

## 2024-06-04 RX ORDER — PROPOFOL 10 MG/ML
INJECTION, EMULSION INTRAVENOUS CONTINUOUS PRN
Status: DISCONTINUED | OUTPATIENT
Start: 2024-06-04 | End: 2024-06-04

## 2024-06-04 RX ORDER — DROPERIDOL 2.5 MG/ML
0.62 INJECTION, SOLUTION INTRAMUSCULAR; INTRAVENOUS ONCE AS NEEDED
Status: DISCONTINUED | OUTPATIENT
Start: 2024-06-04 | End: 2024-06-04 | Stop reason: HOSPADM

## 2024-06-04 RX ORDER — HYDRALAZINE HYDROCHLORIDE 20 MG/ML
10 INJECTION INTRAMUSCULAR; INTRAVENOUS EVERY 4 HOURS PRN
Status: DISCONTINUED | OUTPATIENT
Start: 2024-06-04 | End: 2024-06-05 | Stop reason: HOSPADM

## 2024-06-04 RX ORDER — PROCHLORPERAZINE EDISYLATE 5 MG/ML
10 INJECTION INTRAMUSCULAR; INTRAVENOUS EVERY 6 HOURS PRN
Status: DISCONTINUED | OUTPATIENT
Start: 2024-06-04 | End: 2024-06-05 | Stop reason: HOSPADM

## 2024-06-04 RX ORDER — CELECOXIB 400 MG/1
400 CAPSULE ORAL ONCE
Status: COMPLETED | OUTPATIENT
Start: 2024-06-04 | End: 2024-06-04

## 2024-06-04 RX ORDER — MIDAZOLAM HYDROCHLORIDE 1 MG/ML
INJECTION INTRAMUSCULAR; INTRAVENOUS AS NEEDED
Status: DISCONTINUED | OUTPATIENT
Start: 2024-06-04 | End: 2024-06-04

## 2024-06-04 RX ORDER — ALPRAZOLAM 0.5 MG/1
0.5 TABLET ORAL 2 TIMES DAILY PRN
Status: DISCONTINUED | OUTPATIENT
Start: 2024-06-04 | End: 2024-06-05 | Stop reason: HOSPADM

## 2024-06-04 RX ORDER — HYDROMORPHONE HYDROCHLORIDE 2 MG/ML
INJECTION, SOLUTION INTRAMUSCULAR; INTRAVENOUS; SUBCUTANEOUS AS NEEDED
Status: DISCONTINUED | OUTPATIENT
Start: 2024-06-04 | End: 2024-06-04

## 2024-06-04 RX ORDER — SCOLOPAMINE TRANSDERMAL SYSTEM 1 MG/1
1 PATCH, EXTENDED RELEASE TRANSDERMAL ONCE
Status: DISCONTINUED | OUTPATIENT
Start: 2024-06-04 | End: 2024-06-04

## 2024-06-04 RX ORDER — PROCHLORPERAZINE 25 MG/1
25 SUPPOSITORY RECTAL EVERY 12 HOURS PRN
Status: DISCONTINUED | OUTPATIENT
Start: 2024-06-04 | End: 2024-06-05 | Stop reason: HOSPADM

## 2024-06-04 RX ORDER — SODIUM CHLORIDE, SODIUM LACTATE, POTASSIUM CHLORIDE, CALCIUM CHLORIDE 600; 310; 30; 20 MG/100ML; MG/100ML; MG/100ML; MG/100ML
100 INJECTION, SOLUTION INTRAVENOUS CONTINUOUS
Status: DISCONTINUED | OUTPATIENT
Start: 2024-06-04 | End: 2024-06-04 | Stop reason: HOSPADM

## 2024-06-04 RX ORDER — ONDANSETRON HYDROCHLORIDE 2 MG/ML
4 INJECTION, SOLUTION INTRAVENOUS EVERY 8 HOURS PRN
Status: DISCONTINUED | OUTPATIENT
Start: 2024-06-04 | End: 2024-06-05 | Stop reason: HOSPADM

## 2024-06-04 RX ORDER — OXYCODONE HYDROCHLORIDE 5 MG/1
5 TABLET ORAL EVERY 4 HOURS PRN
Status: DISCONTINUED | OUTPATIENT
Start: 2024-06-04 | End: 2024-06-04 | Stop reason: HOSPADM

## 2024-06-04 RX ORDER — NALOXONE HYDROCHLORIDE 0.4 MG/ML
0.2 INJECTION, SOLUTION INTRAMUSCULAR; INTRAVENOUS; SUBCUTANEOUS EVERY 5 MIN PRN
Status: DISCONTINUED | OUTPATIENT
Start: 2024-06-04 | End: 2024-06-05 | Stop reason: HOSPADM

## 2024-06-04 RX ORDER — PROCHLORPERAZINE MALEATE 5 MG
10 TABLET ORAL EVERY 6 HOURS PRN
Status: DISCONTINUED | OUTPATIENT
Start: 2024-06-04 | End: 2024-06-05 | Stop reason: HOSPADM

## 2024-06-04 RX ORDER — ONDANSETRON 4 MG/1
4 TABLET, ORALLY DISINTEGRATING ORAL EVERY 8 HOURS PRN
Status: DISCONTINUED | OUTPATIENT
Start: 2024-06-04 | End: 2024-06-05 | Stop reason: HOSPADM

## 2024-06-04 RX ORDER — OXYCODONE HYDROCHLORIDE 5 MG/1
5 TABLET ORAL EVERY 4 HOURS PRN
Status: DISCONTINUED | OUTPATIENT
Start: 2024-06-04 | End: 2024-06-05 | Stop reason: HOSPADM

## 2024-06-04 RX ORDER — FLUOXETINE HYDROCHLORIDE 20 MG/1
20 CAPSULE ORAL DAILY
Status: DISCONTINUED | OUTPATIENT
Start: 2024-06-05 | End: 2024-06-05 | Stop reason: HOSPADM

## 2024-06-04 RX ORDER — ACETAMINOPHEN 325 MG/1
650 TABLET ORAL EVERY 6 HOURS
Status: DISCONTINUED | OUTPATIENT
Start: 2024-06-04 | End: 2024-06-05 | Stop reason: HOSPADM

## 2024-06-04 RX ORDER — ACETAMINOPHEN 325 MG/1
650 TABLET ORAL ONCE
Status: COMPLETED | OUTPATIENT
Start: 2024-06-04 | End: 2024-06-04

## 2024-06-04 RX ORDER — PROPOFOL 10 MG/ML
INJECTION, EMULSION INTRAVENOUS AS NEEDED
Status: DISCONTINUED | OUTPATIENT
Start: 2024-06-04 | End: 2024-06-04

## 2024-06-04 RX ORDER — HEPARIN SODIUM 5000 [USP'U]/ML
5000 INJECTION, SOLUTION INTRAVENOUS; SUBCUTANEOUS ONCE
Status: COMPLETED | OUTPATIENT
Start: 2024-06-04 | End: 2024-06-04

## 2024-06-04 RX ORDER — SODIUM CHLORIDE AND POTASSIUM CHLORIDE 150; 900 MG/100ML; MG/100ML
75 INJECTION, SOLUTION INTRAVENOUS CONTINUOUS
Status: DISCONTINUED | OUTPATIENT
Start: 2024-06-04 | End: 2024-06-05 | Stop reason: HOSPADM

## 2024-06-04 RX ORDER — OXYCODONE HYDROCHLORIDE 10 MG/1
10 TABLET ORAL EVERY 4 HOURS PRN
Status: DISCONTINUED | OUTPATIENT
Start: 2024-06-04 | End: 2024-06-05 | Stop reason: HOSPADM

## 2024-06-04 RX ADMIN — ONDANSETRON 4 MG: 2 INJECTION INTRAMUSCULAR; INTRAVENOUS at 08:35

## 2024-06-04 RX ADMIN — KETOROLAC TROMETHAMINE 15 MG: 15 INJECTION, SOLUTION INTRAMUSCULAR; INTRAVENOUS at 17:01

## 2024-06-04 RX ADMIN — SUGAMMADEX 200 MG: 100 INJECTION, SOLUTION INTRAVENOUS at 09:59

## 2024-06-04 RX ADMIN — PROPOFOL 150 MG: 10 INJECTION, EMULSION INTRAVENOUS at 08:20

## 2024-06-04 RX ADMIN — ROCURONIUM BROMIDE 60 MG: 10 INJECTION, SOLUTION INTRAVENOUS at 08:20

## 2024-06-04 RX ADMIN — FENTANYL CITRATE 50 MCG: 50 INJECTION, SOLUTION INTRAMUSCULAR; INTRAVENOUS at 08:20

## 2024-06-04 RX ADMIN — HEPARIN SODIUM 5000 UNITS: 5000 INJECTION INTRAVENOUS; SUBCUTANEOUS at 07:45

## 2024-06-04 RX ADMIN — LIDOCAINE HYDROCHLORIDE 100 MG: 20 INJECTION INTRAVENOUS at 08:20

## 2024-06-04 RX ADMIN — ONDANSETRON 4 MG: 2 INJECTION INTRAMUSCULAR; INTRAVENOUS at 09:38

## 2024-06-04 RX ADMIN — ACETAMINOPHEN 650 MG: 325 TABLET ORAL at 17:42

## 2024-06-04 RX ADMIN — HYDROMORPHONE HYDROCHLORIDE 0.5 MG: 1 INJECTION, SOLUTION INTRAMUSCULAR; INTRAVENOUS; SUBCUTANEOUS at 10:24

## 2024-06-04 RX ADMIN — ACETAMINOPHEN 650 MG: 325 TABLET ORAL at 07:45

## 2024-06-04 RX ADMIN — ONDANSETRON 4 MG: 2 INJECTION INTRAMUSCULAR; INTRAVENOUS at 13:34

## 2024-06-04 RX ADMIN — FENTANYL CITRATE 50 MCG: 50 INJECTION, SOLUTION INTRAMUSCULAR; INTRAVENOUS at 08:16

## 2024-06-04 RX ADMIN — CELECOXIB 400 MG: 400 CAPSULE ORAL at 07:45

## 2024-06-04 RX ADMIN — PROPOFOL 50 MG: 10 INJECTION, EMULSION INTRAVENOUS at 08:23

## 2024-06-04 RX ADMIN — SIMETHICONE 80 MG: 80 TABLET, CHEWABLE ORAL at 17:01

## 2024-06-04 RX ADMIN — PROPOFOL 25 MCG/KG/MIN: 10 INJECTION, EMULSION INTRAVENOUS at 08:44

## 2024-06-04 RX ADMIN — Medication 120 MCG: at 08:53

## 2024-06-04 RX ADMIN — MIDAZOLAM HYDROCHLORIDE 2 MG: 1 INJECTION, SOLUTION INTRAMUSCULAR; INTRAVENOUS at 08:16

## 2024-06-04 RX ADMIN — Medication 2 L/MIN: at 12:30

## 2024-06-04 RX ADMIN — SCOPOLAMINE 1 PATCH: 1 SYSTEM TRANSDERMAL at 07:45

## 2024-06-04 RX ADMIN — APREPITANT 40 MG: 40 CAPSULE ORAL at 07:45

## 2024-06-04 RX ADMIN — HYDROMORPHONE HYDROCHLORIDE 0.2 MG: 2 INJECTION, SOLUTION INTRAMUSCULAR; INTRAVENOUS; SUBCUTANEOUS at 09:55

## 2024-06-04 RX ADMIN — KETOROLAC TROMETHAMINE 15 MG: 15 INJECTION, SOLUTION INTRAMUSCULAR; INTRAVENOUS at 13:18

## 2024-06-04 RX ADMIN — HYDROMORPHONE HYDROCHLORIDE 0.4 MG: 2 INJECTION, SOLUTION INTRAMUSCULAR; INTRAVENOUS; SUBCUTANEOUS at 08:30

## 2024-06-04 RX ADMIN — SODIUM CHLORIDE, POTASSIUM CHLORIDE, SODIUM LACTATE AND CALCIUM CHLORIDE: 600; 310; 30; 20 INJECTION, SOLUTION INTRAVENOUS at 10:02

## 2024-06-04 RX ADMIN — HYDROMORPHONE HYDROCHLORIDE 0.5 MG: 1 INJECTION, SOLUTION INTRAMUSCULAR; INTRAVENOUS; SUBCUTANEOUS at 10:32

## 2024-06-04 RX ADMIN — SIMETHICONE 80 MG: 80 TABLET, CHEWABLE ORAL at 21:26

## 2024-06-04 RX ADMIN — ENOXAPARIN SODIUM 40 MG: 40 INJECTION SUBCUTANEOUS at 17:01

## 2024-06-04 RX ADMIN — SODIUM CHLORIDE, POTASSIUM CHLORIDE, SODIUM LACTATE AND CALCIUM CHLORIDE 100 ML/HR: 600; 310; 30; 20 INJECTION, SOLUTION INTRAVENOUS at 07:45

## 2024-06-04 RX ADMIN — DEXTROSE MONOHYDRATE 2 G: 5 INJECTION INTRAVENOUS at 08:25

## 2024-06-04 RX ADMIN — POTASSIUM CHLORIDE AND SODIUM CHLORIDE 75 ML/HR: 900; 150 INJECTION, SOLUTION INTRAVENOUS at 13:34

## 2024-06-04 RX ADMIN — Medication 80 MCG: at 09:14

## 2024-06-04 RX ADMIN — HYDROMORPHONE HYDROCHLORIDE 0.5 MG: 1 INJECTION, SOLUTION INTRAMUSCULAR; INTRAVENOUS; SUBCUTANEOUS at 10:59

## 2024-06-04 RX ADMIN — DEXAMETHASONE SODIUM PHOSPHATE 8 MG: 4 INJECTION INTRA-ARTICULAR; INTRALESIONAL; INTRAMUSCULAR; INTRAVENOUS; SOFT TISSUE at 08:35

## 2024-06-04 RX ADMIN — Medication 120 MCG: at 08:52

## 2024-06-04 RX ADMIN — GABAPENTIN 600 MG: 300 CAPSULE ORAL at 07:45

## 2024-06-04 RX ADMIN — OXYCODONE HYDROCHLORIDE 10 MG: 10 TABLET ORAL at 21:27

## 2024-06-04 RX ADMIN — Medication 120 MCG: at 08:51

## 2024-06-04 RX ADMIN — ACETAMINOPHEN 650 MG: 325 TABLET ORAL at 13:19

## 2024-06-04 SDOH — HEALTH STABILITY: MENTAL HEALTH: CURRENT SMOKER: 0

## 2024-06-04 SDOH — ECONOMIC STABILITY: TRANSPORTATION INSECURITY
IN THE PAST 12 MONTHS, HAS LACK OF TRANSPORTATION KEPT YOU FROM MEETINGS, WORK, OR FROM GETTING THINGS NEEDED FOR DAILY LIVING?: NO

## 2024-06-04 SDOH — ECONOMIC STABILITY: HOUSING INSECURITY
IN THE LAST 12 MONTHS, WAS THERE A TIME WHEN YOU DID NOT HAVE A STEADY PLACE TO SLEEP OR SLEPT IN A SHELTER (INCLUDING NOW)?: NO

## 2024-06-04 SDOH — SOCIAL STABILITY: SOCIAL INSECURITY: ARE THERE ANY APPARENT SIGNS OF INJURIES/BEHAVIORS THAT COULD BE RELATED TO ABUSE/NEGLECT?: NO

## 2024-06-04 SDOH — ECONOMIC STABILITY: INCOME INSECURITY: IN THE LAST 12 MONTHS, WAS THERE A TIME WHEN YOU WERE NOT ABLE TO PAY THE MORTGAGE OR RENT ON TIME?: NO

## 2024-06-04 SDOH — SOCIAL STABILITY: SOCIAL INSECURITY: HAVE YOU HAD THOUGHTS OF HARMING ANYONE ELSE?: NO

## 2024-06-04 SDOH — SOCIAL STABILITY: SOCIAL INSECURITY: DOES ANYONE TRY TO KEEP YOU FROM HAVING/CONTACTING OTHER FRIENDS OR DOING THINGS OUTSIDE YOUR HOME?: NO

## 2024-06-04 SDOH — ECONOMIC STABILITY: TRANSPORTATION INSECURITY
IN THE PAST 12 MONTHS, HAS THE LACK OF TRANSPORTATION KEPT YOU FROM MEDICAL APPOINTMENTS OR FROM GETTING MEDICATIONS?: NO

## 2024-06-04 SDOH — SOCIAL STABILITY: SOCIAL INSECURITY: DO YOU FEEL ANYONE HAS EXPLOITED OR TAKEN ADVANTAGE OF YOU FINANCIALLY OR OF YOUR PERSONAL PROPERTY?: NO

## 2024-06-04 SDOH — SOCIAL STABILITY: SOCIAL INSECURITY: HAS ANYONE EVER THREATENED TO HURT YOUR FAMILY OR YOUR PETS?: NO

## 2024-06-04 SDOH — SOCIAL STABILITY: SOCIAL INSECURITY: ABUSE: ADULT

## 2024-06-04 SDOH — ECONOMIC STABILITY: INCOME INSECURITY: HOW HARD IS IT FOR YOU TO PAY FOR THE VERY BASICS LIKE FOOD, HOUSING, MEDICAL CARE, AND HEATING?: NOT VERY HARD

## 2024-06-04 SDOH — SOCIAL STABILITY: SOCIAL INSECURITY: HAVE YOU HAD ANY THOUGHTS OF HARMING ANYONE ELSE?: NO

## 2024-06-04 SDOH — SOCIAL STABILITY: SOCIAL INSECURITY: DO YOU FEEL UNSAFE GOING BACK TO THE PLACE WHERE YOU ARE LIVING?: NO

## 2024-06-04 SDOH — ECONOMIC STABILITY: HOUSING INSECURITY: IN THE LAST 12 MONTHS, HOW MANY PLACES HAVE YOU LIVED?: 2

## 2024-06-04 SDOH — SOCIAL STABILITY: SOCIAL INSECURITY: WERE YOU ABLE TO COMPLETE ALL THE BEHAVIORAL HEALTH SCREENINGS?: YES

## 2024-06-04 SDOH — SOCIAL STABILITY: SOCIAL INSECURITY: ARE YOU OR HAVE YOU BEEN THREATENED OR ABUSED PHYSICALLY, EMOTIONALLY, OR SEXUALLY BY ANYONE?: NO

## 2024-06-04 ASSESSMENT — PAIN SCALES - PAIN ASSESSMENT IN ADVANCED DEMENTIA (PAINAD)
TOTALSCORE: MEDICATION (SEE MAR)
TOTALSCORE: MEDICATION (SEE MAR)
TOTALSCORE: MEDICATION (SEE MAR);COLD APPLIED

## 2024-06-04 ASSESSMENT — COGNITIVE AND FUNCTIONAL STATUS - GENERAL
MOBILITY SCORE: 22
CLIMB 3 TO 5 STEPS WITH RAILING: A LITTLE
HELP NEEDED FOR BATHING: A LITTLE
DRESSING REGULAR LOWER BODY CLOTHING: A LITTLE
STANDING UP FROM CHAIR USING ARMS: A LITTLE
MOVING FROM LYING ON BACK TO SITTING ON SIDE OF FLAT BED WITH BEDRAILS: A LITTLE
TURNING FROM BACK TO SIDE WHILE IN FLAT BAD: A LITTLE
CLIMB 3 TO 5 STEPS WITH RAILING: A LITTLE
MOBILITY SCORE: 18
TOILETING: A LITTLE
DRESSING REGULAR LOWER BODY CLOTHING: A LITTLE
MOVING TO AND FROM BED TO CHAIR: A LITTLE
WALKING IN HOSPITAL ROOM: A LITTLE
DAILY ACTIVITIY SCORE: 20
PATIENT BASELINE BEDBOUND: NO
HELP NEEDED FOR BATHING: A LITTLE
DRESSING REGULAR UPPER BODY CLOTHING: A LITTLE
DAILY ACTIVITIY SCORE: 21
TOILETING: A LITTLE
WALKING IN HOSPITAL ROOM: A LITTLE

## 2024-06-04 ASSESSMENT — PAIN SCALES - GENERAL
PAINLEVEL_OUTOF10: 7
PAINLEVEL_OUTOF10: 8
PAINLEVEL_OUTOF10: 7
PAINLEVEL_OUTOF10: 7
PAINLEVEL_OUTOF10: 5 - MODERATE PAIN
PAINLEVEL_OUTOF10: 4
PAINLEVEL_OUTOF10: 9
PAINLEVEL_OUTOF10: 0 - NO PAIN
PAINLEVEL_OUTOF10: 3

## 2024-06-04 ASSESSMENT — PAIN DESCRIPTION - ORIENTATION: ORIENTATION: UPPER

## 2024-06-04 ASSESSMENT — ACTIVITIES OF DAILY LIVING (ADL)
WALKS IN HOME: NEEDS ASSISTANCE
ASSISTIVE_DEVICE: EYEGLASSES
HEARING - RIGHT EAR: FUNCTIONAL
JUDGMENT_ADEQUATE_SAFELY_COMPLETE_DAILY_ACTIVITIES: YES
BATHING: NEEDS ASSISTANCE
HEARING - LEFT EAR: FUNCTIONAL
DRESSING YOURSELF: NEEDS ASSISTANCE
PATIENT'S MEMORY ADEQUATE TO SAFELY COMPLETE DAILY ACTIVITIES?: YES
ADEQUATE_TO_COMPLETE_ADL: YES
GROOMING: NEEDS ASSISTANCE
FEEDING YOURSELF: NEEDS ASSISTANCE
TOILETING: NEEDS ASSISTANCE

## 2024-06-04 ASSESSMENT — LIFESTYLE VARIABLES
HOW OFTEN DO YOU HAVE 6 OR MORE DRINKS ON ONE OCCASION: NEVER
SKIP TO QUESTIONS 9-10: 1
AUDIT-C TOTAL SCORE: 0
AUDIT-C TOTAL SCORE: 0
HOW OFTEN DO YOU HAVE A DRINK CONTAINING ALCOHOL: NEVER
HOW MANY STANDARD DRINKS CONTAINING ALCOHOL DO YOU HAVE ON A TYPICAL DAY: PATIENT DOES NOT DRINK

## 2024-06-04 ASSESSMENT — PAIN DESCRIPTION - LOCATION
LOCATION: ABDOMEN
LOCATION: ABDOMEN

## 2024-06-04 ASSESSMENT — PAIN - FUNCTIONAL ASSESSMENT
PAIN_FUNCTIONAL_ASSESSMENT: 0-10

## 2024-06-04 ASSESSMENT — PATIENT HEALTH QUESTIONNAIRE - PHQ9
2. FEELING DOWN, DEPRESSED OR HOPELESS: NOT AT ALL
1. LITTLE INTEREST OR PLEASURE IN DOING THINGS: NOT AT ALL
SUM OF ALL RESPONSES TO PHQ9 QUESTIONS 1 & 2: 0

## 2024-06-04 NOTE — CARE PLAN
Problem: Psychosocial Needs  Goal: Collaborate with me, my family, and caregiver to identify my specific goals  Recent Flowsheet Documentation  Taken 6/4/2024 1307 by Antoinette Duarte RN  Cultural Requests During Hospitalization: none  Spiritual Requests During Hospitalization: none   The patient's goals for the shift include      The clinical goals for the shift include pain management, fluid management and wound monitoring for the patient

## 2024-06-04 NOTE — ANESTHESIA POSTPROCEDURE EVALUATION
Patient: Erick Becerril    Procedure Summary       Date: 06/04/24 Room / Location: [facility] OR 07 / [facility]    Anesthesia Start: 0812 Anesthesia Stop: 1014    Procedures:       GASTRIC SLEEVE LAPAROSCOPIC      REPAIR DIAPHRAGMATIC HERNIA LAPAROSCOPY Diagnosis:       Morbid obesity due to excess calories (Multi)      Hiatal hernia      (Morbid obesity due to excess calories (Multi) [E66.01])      (Hiatal hernia [K44.9])    Surgeons: Nacho Salmon MD Responsible Provider: Jhon Rodríguez DO    Anesthesia Type: general ASA Status: 2            Anesthesia Type: general    Vitals Value Taken Time   /90 06/04/24 1059   Temp 36.4 °C (97.5 °F) 06/04/24 1032   Pulse 88 06/04/24 1059   Resp 15 06/04/24 1059   SpO2 99 % 06/04/24 1059       Anesthesia Post Evaluation    Patient location during evaluation: PACU  Patient participation: complete - patient participated  Level of consciousness: awake and alert  Pain management: adequate  Airway patency: patent  Cardiovascular status: acceptable  Respiratory status: acceptable  Hydration status: acceptable  Postoperative Nausea and Vomiting: none        No notable events documented.

## 2024-06-04 NOTE — OP NOTE
DATE PERFORMED:  06/04/24     SURGEON:  Nacho Salmon MD    ASSISTANT: Dr. Ally Joaquin     PREOPERATIVE DIAGNOSES:  Morbid obesity, BMI 54.1, hiatal hernia with GERD.     POSTOPERATIVE DIAGNOSES:  Morbid obesity, BMI 54.1, hiatal hernia with GERD.     PROCEDURES PERFORMED:  1. Laparoscopic sleeve gastrectomy, 2.  Paraesophageal hernia repair, 3. intraoperative  endoscopy, 4. transversus abdominis plane block.     COMPLICATIONS:  None.     ESTIMATED BLOOD LOSS:  5 ml     DRAINS:  None.     ANESTHESIA:  General endotracheal.     SPECIMENS:  Greater curvature of the stomach.     BRIEF HISTORY:   Erick Becerril is a 29 y.o. yo female, morbidly obese with  a BMI of 54.1.   she has obesity-related comorbidities including  Hiatal hernia with GERD.  she has attempted and failed multiple diet and exercise  regimens in the past.   she was thoroughly evaluated in a  multidisciplinary fashion and found to be an appropriate candidate for laparoscopic sleeve  Gastrectomy and hiatal hernia repair.   she was informed of the risks and benefits of the  procedure and expressed understanding and consent.     PROCEDURE IN DETAIL:  On the day of the operation, Erick Becerril was  properly identified in the holding area.   The patient was taken to the operating  room, placed in a supine position on the operating room table.   An OR huddle was performed. she was  given appropriate perioperative IV antibiotics and heparin subcu.  SCDs  were placed.   Anesthesia was induced and the patient was intubation.  An appropriate OR  time-out was performed.   The patient was prepped and draped in normal sterile  fashion.  A nick was made in the left upper quadrant and a Veress needle  was inserted.  The abdomen was insufflated with CO2 and a 5 mm Optiview  trocar was inserted under direct visualization just to the right and  above the umbilicus.  The upper abdomen was surveilled and no scar  tissue was found.  A bilateral transversus abdominis plane  block was  performed with a mixture of marcaine, decadron, and saline by myself and the assistant.  This  mixture was also used to numb the locations of our trocar placements.  The following trocars were then placed under direct visualization with  the laparoscope by myself and the assistant.  A 5 mm trocar was placed in the left upper quadrant  anterior axillary line.  A 5 mm trocar was placed in the right upper  quadrant anterior axillary line.  A 5 mm trocar was placed in the right  upper quadrant midclavicular line subcostally and finally, a 15 mm trocar  was placed in the right upper quadrant midclavicular line above the  level of the umbilicus.  The camera was controlled by the assistant for the entire case. We then placed the patient in reverse  Trendelenburg position and a snake liver retractor was inserted and  retracted the left lobe of the liver away from the hiatus.  We  began the procedure by grasping the greater curvature of the stomach at  the level of the incisura and created a defect in the gastrocolic  ligament with the Harmonic Scalpel.  We then proceeded to excise the  omentum off the greater curvature of the stomach using the Harmonic  Scalpel, moving proximally until we reached the angle of His. The assistant provided critical retraction during this portion of the procedure. We were  careful to dissect away any posterior attachments both to the floppy  fundus and we used jeet on some thin attachments on the posterior body of the stomach. The assistant mobilized the distal portion of the  greater curvature to a level 5 cm proximal to the pylorus.  We began our hiatal hernia repair by opening the pars flacida with the harmonic scalpel and dissected the stomach and esophagus away from the right joon of the diaphragm. This dissection proceeded circumferentially until we cleared the posterior joon. We then passed a 1/2 inch penrose drain posteriorly around the esophagus for traction, securing it  with an endoloop. We completed our hiatal dissection by clearing the anterior joon as well as the left joon. We carefully dissected out the intrathoracic attachments of the esophagus until 3 cm length of esophagus rested intra-abdominally without tension. The vagus nerves were visualized and preserved. Anesthesia passed a 56 Syriac bougie through the esophagus into the stomach to use as a sizer. We closed the hiatal defect posteriorly with 2 figure-of-eight sutures using 0-surgidac and the Endostitch device. The Penrose and bougie were removed.  We then placed a  green load Endo stapler with a SeamGuard taking a small bite from the  greater curvature of the stomach to begin creating our sleeve.  With the help of the assistant,   the stomach was tented up and anesthesia placed a 36-Syriac Visigi  along the lesser curvature of the stomach.  The bougie was used as a  sizer and we proceeded to fire several more loads of the blue load Endo  stapler with a SeamGuard moving proximally, hugging the bougie, to create  a tight sleeve.  Our final load was a blue load of the stapler,  completely transecting off the gastric fundus.  The assistant provided necessary retraction during this process. We placed several clips  on the distal staple line for hemostasis.  We then placed a bowel clamp  across the distal stomach and the assistant instilled sterile saline into the upper  quadrant around our new sleeve.  The endoscope was inserted through the  oropharynx into the sleeve.  We were able to  visualize a straight staple line with no bleeding.  Air was instilled  into the stomach and submerged underneath the sterile saline in the  abdominal cavity.  There were no bubbles and thus the leak test was  negative.  The assistant then suctioned the air out of the stomach, removed the  endoscope and suctioned the saline out of the abdominal cavity and  removed the bowel clamp.   We  tacked the distal portion of the sleeve to the hanging  gastrocolic  ligament to prevent curling with a 2-0 Strattafix suture.  We removed the gastric remnant through the  15 mm trocar site.  The liver retractor was removed. Satisfied with our hemostasis throughout the  abdomen, the assistant helped to close the 15 mm trocar site with 0 Vicryl  sutures using the Jeromy-Kavin device.  We then desufflated the  abdomen of CO2, removed the camera and the trocars.  The 15 mm trocar  site was copiously irrigated with sterile saline and then all the skin  incisions were closed with 4-0 Monocryl subcuticular sutures and  Dermabond by the assistant.  The patient tolerated the procedure without difficulty, was  extubated immediately postoperatively and was transferred to the PACU in  good condition.    This operation could not have been safely performed (without compromising the technical results or length of the procedure) without the assistance of a skilled surgical assistant. A surgical assistant was medically necessary for positioning, retraction and instrumentation.  A resident was utilized as an assistant for this case.      Nacho Salmon MD

## 2024-06-04 NOTE — BRIEF OP NOTE
Date: 2024  OR Location: [facility]    Name: Erick Becerril, : 1995, Age: 29 y.o., MRN: [id number], Sex: female    Diagnosis  Pre-op Diagnosis     * Morbid obesity due to excess calories (Multi) [E66.01]     * Hiatal hernia [K44.9] Post-op Diagnosis     * Morbid obesity due to excess calories (Multi) [E66.01]     * Hiatal hernia [K44.9]     Procedures  GASTRIC SLEEVE LAPAROSCOPIC  68012 - CT LAPS GSTRC RSTRICTIV PX LONGITUDINAL GASTRECTOMY    REPAIR DIAPHRAGMATIC HERNIA LAPAROSCOPY  15790 - CT LAPS RPR PARAESPHGL HRNA INCL FUNDPLSTY W/O MESH     TAP Block  EGD    Surgeons      * Nacho Salmon - Primary    Resident/Fellow/Other Assistant:  Surgeons and Role:  * No surgeons found with a matching role *  Dr Ally Joaquin    Procedure Summary  Anesthesia: Consult  ASA: ASA status not filed in the log.  Anesthesia Staff: Anesthesiologist: Jhon Rodríguez DO  CRNA: JEANINE Rodriguez-CRNA  Estimated Blood Loss: 5 mL  Intra-op Medications:   Administrations occurring from 0800 to 1030 on 24:   Medication Name Total Dose   BUPivacaine HCl (Marcaine) 0.5 % (5 mg/mL) 30 mL, bupivacaine PF (Marcaine) 0.25 % (2.5 mg/mL) 30 mL, dexAMETHasone (Decadron) 4 mg in sodium chloride 0.9% 100 mL syringe 161 mL   ceFAZolin (Ancef) 3 g in dextrose 5% 100 mL IV 2 g              Anesthesia Record               Intraprocedure I/O Totals          Intake    Propofol Drip 0.00 mL    The total shown is the total volume documented since Anesthesia Start was filed.    Total Intake 0 mL       Output    Est. Blood Loss 5 mL    Total Output 5 mL       Net    Net Volume -5 mL          Specimen:   ID Type Source Tests Collected by Time   1 : GREATER CURVATURE OF STOMACH Tissue STOMACH SLEEVE RESECTION SURGICAL PATHOLOGY EXAM Nacho Salmon MD 2024 0950        Staff:   Circulator: Erin Corbin Person: Deysi          Findings: small hiatal hernia    Complications:  None; patient tolerated the procedure well.     Disposition: PACU -  hemodynamically stable.  Condition: stable  Specimens Collected:   ID Type Source Tests Collected by Time   1 : GREATER CURVATURE OF STOMACH Tissue STOMACH SLEEVE RESECTION SURGICAL PATHOLOGY EXAM Nacho Salmon MD 6/4/2024 0978     Attending Attestation: I was present and scrubbed for the key portions of the procedure.    Nacoh Salmon  Phone Number: [phone]

## 2024-06-04 NOTE — ANESTHESIA PREPROCEDURE EVALUATION
Patient: Erick Becerril    Procedure Information       Anesthesia Start Date/Time: 06/04/24 0812    Procedures:       GASTRIC SLEEVE LAPAROSCOPIC      REPAIR DIAPHRAGMATIC HERNIA LAPAROSCOPY    Location: [facility] OR 07 / [facility]    Surgeons: Nacho Salmon MD            Relevant Problems   GI   (+) Hiatal hernia with GERD and esophagitis      Endocrine   (+) Class 3 severe obesity due to excess calories with body mass index (BMI) of 45.0 to 49.9 in adult (Multi)   (+) Morbid obesity due to excess calories (Multi)      Hematology   (+) Anemia      HEENT   (+) Seasonal allergies      ID   (+) Furuncle of umbilicus   (+) Hand, foot and mouth disease   (+) Wound infection       Clinical information reviewed:   Tobacco  Allergies  Meds   Med Hx  Surg Hx  OB Status  Fam Hx  Soc   Hx        NPO Detail:  NPO/Void Status  Carbohydrate Drink Given Prior to Surgery? : N  Date of Last Liquid: 06/04/24  Time of Last Liquid: 0600  Date of Last Solid: 06/02/24  Time of Last Solid: 1200  Last Intake Type: Clear fluids  Time of Last Void: 0700         Physical Exam    Airway  Mallampati: II     Cardiovascular   Rhythm: regular     Dental    Pulmonary    Abdominal            Anesthesia Plan    History of general anesthesia?: yes  History of complications of general anesthesia?: no    ASA 2     The patient is not a current smoker.    Anesthetic plan and risks discussed with patient.

## 2024-06-04 NOTE — CARE PLAN
The patient's goals for the shift include      The clinical goals for the shift include pain management, fluid management and wound monitoring for the patient      Problem: Psychosocial Needs  Goal: Collaborate with me, my family, and caregiver to identify my specific goals  Recent Flowsheet Documentation  Taken 6/4/2024 1307 by Antoinette Duarte RN  Cultural Requests During Hospitalization: none  Spiritual Requests During Hospitalization: none

## 2024-06-04 NOTE — ANESTHESIA PROCEDURE NOTES
Airway  Date/Time: 6/4/2024 8:23 AM  Urgency: elective    Airway not difficult    Staffing  Performed: CRNA   Authorized by: Jhon Rodríguez DO    Performed by: JEANINE Rodriguez-PUSHPA  Patient location during procedure: OR    Indications and Patient Condition  Indications for airway management: anesthesia  Spontaneous Ventilation: absent  Sedation level: deep  Preoxygenated: yes  Patient position: sniffing  Mask difficulty assessment: 1 - vent by mask    Final Airway Details  Final airway type: endotracheal airway      Successful airway: ETT  Cuffed: yes   Successful intubation technique: video laryngoscopy  Facilitating devices/methods: intubating stylet  Endotracheal tube insertion site: oral  Blade: Natalia  Blade size: #3  ETT size (mm): 7.5  Cormack-Lehane Classification: grade I - full view of glottis  Placement verified by: chest auscultation and capnometry   Measured from: lips  ETT to lips (cm): 22  Number of attempts at approach: 1  Number of other approaches attempted: 0    Additional Comments  Dentition same as preop after laryngoscopy with Cronin and intubation.

## 2024-06-05 ENCOUNTER — APPOINTMENT (OUTPATIENT)
Dept: RADIOLOGY | Facility: HOSPITAL | Age: 29
End: 2024-06-05
Payer: COMMERCIAL

## 2024-06-05 ENCOUNTER — PHARMACY VISIT (OUTPATIENT)
Dept: PHARMACY | Facility: CLINIC | Age: 29
End: 2024-06-05
Payer: MEDICAID

## 2024-06-05 VITALS
TEMPERATURE: 99.3 F | RESPIRATION RATE: 16 BRPM | OXYGEN SATURATION: 98 % | HEIGHT: 61 IN | WEIGHT: 259.59 LBS | SYSTOLIC BLOOD PRESSURE: 122 MMHG | HEART RATE: 54 BPM | DIASTOLIC BLOOD PRESSURE: 85 MMHG | BODY MASS INDEX: 49.01 KG/M2

## 2024-06-05 LAB
ALBUMIN SERPL BCP-MCNC: 3.9 G/DL (ref 3.4–5)
ALP SERPL-CCNC: 53 U/L (ref 33–110)
ALT SERPL W P-5'-P-CCNC: 38 U/L (ref 7–45)
ANION GAP SERPL CALC-SCNC: 11 MMOL/L (ref 10–20)
AST SERPL W P-5'-P-CCNC: 25 U/L (ref 9–39)
BASOPHILS # BLD AUTO: 0.01 X10*3/UL (ref 0–0.1)
BASOPHILS NFR BLD AUTO: 0.1 %
BILIRUB SERPL-MCNC: 0.4 MG/DL (ref 0–1.2)
BUN SERPL-MCNC: 7 MG/DL (ref 6–23)
CALCIUM SERPL-MCNC: 8.8 MG/DL (ref 8.6–10.3)
CHLORIDE SERPL-SCNC: 105 MMOL/L (ref 98–107)
CO2 SERPL-SCNC: 24 MMOL/L (ref 21–32)
CREAT SERPL-MCNC: 0.7 MG/DL (ref 0.5–1.05)
EGFRCR SERPLBLD CKD-EPI 2021: >90 ML/MIN/1.73M*2
EOSINOPHIL # BLD AUTO: 0 X10*3/UL (ref 0–0.7)
EOSINOPHIL NFR BLD AUTO: 0 %
ERYTHROCYTE [DISTWIDTH] IN BLOOD BY AUTOMATED COUNT: 13.6 % (ref 11.5–14.5)
GLUCOSE SERPL-MCNC: 123 MG/DL (ref 74–99)
HCT VFR BLD AUTO: 33.7 % (ref 36–46)
HGB BLD-MCNC: 10.8 G/DL (ref 12–16)
IMM GRANULOCYTES # BLD AUTO: 0.06 X10*3/UL (ref 0–0.7)
IMM GRANULOCYTES NFR BLD AUTO: 0.5 % (ref 0–0.9)
LYMPHOCYTES # BLD AUTO: 0.7 X10*3/UL (ref 1.2–4.8)
LYMPHOCYTES NFR BLD AUTO: 6.4 %
MCH RBC QN AUTO: 27.6 PG (ref 26–34)
MCHC RBC AUTO-ENTMCNC: 32 G/DL (ref 32–36)
MCV RBC AUTO: 86 FL (ref 80–100)
MONOCYTES # BLD AUTO: 0.53 X10*3/UL (ref 0.1–1)
MONOCYTES NFR BLD AUTO: 4.8 %
NEUTROPHILS # BLD AUTO: 9.66 X10*3/UL (ref 1.2–7.7)
NEUTROPHILS NFR BLD AUTO: 88.2 %
NRBC BLD-RTO: 0 /100 WBCS (ref 0–0)
PLATELET # BLD AUTO: 287 X10*3/UL (ref 150–450)
POTASSIUM SERPL-SCNC: 4.3 MMOL/L (ref 3.5–5.3)
PROT SERPL-MCNC: 6.9 G/DL (ref 6.4–8.2)
RBC # BLD AUTO: 3.92 X10*6/UL (ref 4–5.2)
SODIUM SERPL-SCNC: 136 MMOL/L (ref 136–145)
WBC # BLD AUTO: 11 X10*3/UL (ref 4.4–11.3)

## 2024-06-05 PROCEDURE — 85025 COMPLETE CBC W/AUTO DIFF WBC: CPT | Performed by: SURGERY

## 2024-06-05 PROCEDURE — 74220 X-RAY XM ESOPHAGUS 1CNTRST: CPT

## 2024-06-05 PROCEDURE — 2550000001 HC RX 255 CONTRASTS: Performed by: SURGERY

## 2024-06-05 PROCEDURE — 36415 COLL VENOUS BLD VENIPUNCTURE: CPT | Performed by: SURGERY

## 2024-06-05 PROCEDURE — 2500000005 HC RX 250 GENERAL PHARMACY W/O HCPCS: Performed by: SURGERY

## 2024-06-05 PROCEDURE — 80053 COMPREHEN METABOLIC PANEL: CPT | Performed by: SURGERY

## 2024-06-05 PROCEDURE — 74220 X-RAY XM ESOPHAGUS 1CNTRST: CPT | Performed by: STUDENT IN AN ORGANIZED HEALTH CARE EDUCATION/TRAINING PROGRAM

## 2024-06-05 PROCEDURE — 2500000001 HC RX 250 WO HCPCS SELF ADMINISTERED DRUGS (ALT 637 FOR MEDICARE OP): Performed by: SURGERY

## 2024-06-05 PROCEDURE — 2500000004 HC RX 250 GENERAL PHARMACY W/ HCPCS (ALT 636 FOR OP/ED): Performed by: SURGERY

## 2024-06-05 RX ORDER — SIMETHICONE 80 MG
80 TABLET,CHEWABLE ORAL EVERY 4 HOURS PRN
Start: 2024-06-05

## 2024-06-05 RX ORDER — ACETAMINOPHEN 325 MG/1
650 TABLET ORAL EVERY 6 HOURS
Start: 2024-06-05

## 2024-06-05 RX ADMIN — ACETAMINOPHEN 650 MG: 325 TABLET ORAL at 00:20

## 2024-06-05 RX ADMIN — FLUOXETINE 20 MG: 20 CAPSULE ORAL at 09:26

## 2024-06-05 RX ADMIN — ACETAMINOPHEN 650 MG: 325 TABLET ORAL at 12:24

## 2024-06-05 RX ADMIN — DIATRIZOATE MEGLUMINE AND DIATRIZOATE SODIUM 30 ML: 660; 100 LIQUID ORAL; RECTAL at 09:45

## 2024-06-05 RX ADMIN — KETOROLAC TROMETHAMINE 15 MG: 15 INJECTION, SOLUTION INTRAMUSCULAR; INTRAVENOUS at 06:36

## 2024-06-05 RX ADMIN — KETOROLAC TROMETHAMINE 15 MG: 15 INJECTION, SOLUTION INTRAMUSCULAR; INTRAVENOUS at 12:24

## 2024-06-05 RX ADMIN — ACETAMINOPHEN 650 MG: 325 TABLET ORAL at 06:35

## 2024-06-05 RX ADMIN — Medication 2 L/MIN: at 08:00

## 2024-06-05 RX ADMIN — ENOXAPARIN SODIUM 40 MG: 40 INJECTION SUBCUTANEOUS at 06:35

## 2024-06-05 RX ADMIN — KETOROLAC TROMETHAMINE 15 MG: 15 INJECTION, SOLUTION INTRAMUSCULAR; INTRAVENOUS at 00:20

## 2024-06-05 RX ADMIN — PANTOPRAZOLE SODIUM 40 MG: 40 TABLET, DELAYED RELEASE ORAL at 06:35

## 2024-06-05 RX ADMIN — POTASSIUM CHLORIDE AND SODIUM CHLORIDE 75 ML/HR: 900; 150 INJECTION, SOLUTION INTRAVENOUS at 00:21

## 2024-06-05 RX ADMIN — POTASSIUM CHLORIDE AND SODIUM CHLORIDE 75 ML/HR: 900; 150 INJECTION, SOLUTION INTRAVENOUS at 13:15

## 2024-06-05 RX ADMIN — SIMETHICONE 80 MG: 80 TABLET, CHEWABLE ORAL at 05:03

## 2024-06-05 ASSESSMENT — ACTIVITIES OF DAILY LIVING (ADL): LACK_OF_TRANSPORTATION: NO

## 2024-06-05 NOTE — CONSULTS
"Nutrition Diet Education:   Nutrition Assessment    Reason for Assessment: Provider consult order (Post-op bariatric diet education)    Patient is a 29 y.o. female presenting with morbid obesity, BMI 54.1, hiatal hernia with GERD s/p Laparoscopic sleeve gastrectomy, Paraesophageal hernia repair, intraoperative endoscopy, transversus abdominis plane block.  Patient denies N/V.    Anthropometrics:  Height: 154.9 cm (5' 1\")   Weight: 118 kg (259 lb 9.5 oz)   BMI (Calculated): 49.07          Nutrition Significant Labs:  CBC Trend:   Results from last 7 days   Lab Units 06/05/24  0544 06/04/24  0801   WBC AUTO x10*3/uL 11.0 7.1   RBC AUTO x10*6/uL 3.92* 4.38   HEMOGLOBIN g/dL 10.8* 12.2   HEMATOCRIT % 33.7* 37.2   MCV fL 86 85   PLATELETS AUTO x10*3/uL 287 328    , BMP Trend:   Results from last 7 days   Lab Units 06/05/24 0544 06/04/24  0801   GLUCOSE mg/dL 123* 103*   CALCIUM mg/dL 8.8 9.7   SODIUM mmol/L 136 139   POTASSIUM mmol/L 4.3 4.0   CO2 mmol/L 24 22   CHLORIDE mmol/L 105 103   BUN mg/dL 7 12   CREATININE mg/dL 0.70 0.72        Nutrition Specific Medications:    Current Facility-Administered Medications:     acetaminophen (Tylenol) tablet 650 mg, 650 mg, oral, q6h, Nacho Salmon MD, 650 mg at 06/05/24 0635    ALPRAZolam (Xanax) tablet 0.5 mg, 0.5 mg, oral, BID PRN, Nacho Salmon MD    enoxaparin (Lovenox) syringe 40 mg, 40 mg, subcutaneous, q12h, Nacho Salmon MD, 40 mg at 06/05/24 0635    pantoprazole (ProtoNix) EC tablet 40 mg, 40 mg, oral, Daily before breakfast, 40 mg at 06/05/24 0635 **OR** esomeprazole (NexIUM) suspension 40 mg, 40 mg, nasoduodenal tube, Daily before breakfast **OR** pantoprazole (ProtoNix) injection 40 mg, 40 mg, intravenous, Daily before breakfast, Nacho Salmon MD    FLUoxetine (PROzac) capsule 20 mg, 20 mg, oral, Daily, Nacho Salmon MD, 20 mg at 06/05/24 0926    hydrALAZINE (Apresoline) injection 10 mg, 10 mg, intravenous, q4h PRN, Nacho Salmon MD    HYDROmorphone " (Dilaudid) injection 0.5 mg, 0.5 mg, intravenous, q4h PRN, Nacho Salmon MD    ketorolac (Toradol) injection 15 mg, 15 mg, intravenous, q6h MIGUEL, Nacho Salmon MD, 15 mg at 06/05/24 0636    naloxone (Narcan) injection 0.2 mg, 0.2 mg, intravenous, q5 min PRN, Nacho Salmon MD    ondansetron ODT (Zofran-ODT) disintegrating tablet 4 mg, 4 mg, oral, q8h PRN **OR** ondansetron (Zofran) injection 4 mg, 4 mg, intravenous, q8h PRN, Nacho Salmon MD, 4 mg at 06/04/24 1334    oxyCODONE (Roxicodone) immediate release tablet 10 mg, 10 mg, oral, q4h PRN, Nacho Salmon MD, 10 mg at 06/04/24 2127    oxyCODONE (Roxicodone) immediate release tablet 5 mg, 5 mg, oral, q4h PRN, Nacho Salmon MD    oxygen (O2) therapy, , inhalation, Continuous PRN - O2/gases, Nacho Salmon MD    oxygen (O2) therapy, 2 L/min, inhalation, Continuous - 02/gases, Nacho Salmon MD, 2 L/min at 06/05/24 0800    prochlorperazine (Compazine) tablet 10 mg, 10 mg, oral, q6h PRN **OR** prochlorperazine (Compazine) injection 10 mg, 10 mg, intravenous, q6h PRN **OR** prochlorperazine (Compazine) suppository 25 mg, 25 mg, rectal, q12h PRN, Nacho Salmon MD    simethicone (Mylicon) chewable tablet 80 mg, 80 mg, oral, q4h PRN, Nacho Salmon MD, 80 mg at 06/05/24 0503    sodium chloride 0.9 % with KCl 20 mEq/L infusion, 75 mL/hr, intravenous, Continuous, Nacho Salmon MD, Last Rate: 75 mL/hr at 06/05/24 0117, 75 mL/hr at 06/05/24 0117      Dietary Orders (From admission, onward)       Start     Ordered    06/05/24 0800  Adult diet Bariatric Full Liquid  Diet effective now        Comments: After completion of x-ray esophogram and cleared.  Post op day 1: Must use medicine cups, no straws. No caffeine, no carbonation.  Gastric bypass/revision/DS:  Up to 4 oz per hour (2 oz full and 2 oz clear).  Gastric sleeve:  Up to 8 oz per hour (4 oz full and 4 oz clear)    Post op day 2: Must use medicine cups, no straws. No caffeine, no carbonation.  All  procedures:  Up to 8 oz per hour (4 oz full and 4 oz clear)   Question:  Diet type  Answer:  Bariatric Full Liquid    06/04/24 1202    06/04/24 1203  Ensure High Protein Supplement  Until discontinued        Comments: 2 times a day   Question Answer Comment   Deliver with Breakfast    Deliver with Lunch    Select supplement: Ensure High Protein        06/04/24 1202                   Nutrition Education:   Met with patient. Discussed post-op bariatric diet. Discussed protein, fluids, vitamins and minerals, and tips and recommendations. Provided handouts. Patient expressed understanding of info provided and all of her questions were answered.       Time Spent/Follow-up Reminder:   Time Spent (min): 30 minutes  Follow up: Provided information on outpatient nutrition therapy services, Provided inpatient RDN contact information  Last Date of Nutrition Visit: 06/05/24  Nutrition Follow-Up Needed?: Dietitian to reassess per policy  Follow up Comment: Post-op bariatric diet education

## 2024-06-05 NOTE — DISCHARGE INSTRUCTIONS
Erick Becerril  1995    Date of admission: 6/4/24    Date of discharge: 6/5/4    DISPOSITION: home    PRINCIPAL DIAGNOSIS:  Morbid Obesity    OTHER DIAGNOSES:  Principal Problem:    Morbid obesity due to excess calories (Multi)  Active Problems:    Hiatal hernia with GERD and esophagitis      Primary Care:     Adri Hooker, APRN-CNP  Other Providers:         Procedures while hospitalized:  Laparoscopic sleeve gastrectomy and hiatal hernia repair    PENDING RESULTS:  Pathology results from surgery.    Wound care: You may shower and wash your abdomen with mild soap and water. Pat incisions to dry.  Do not soak in a bath or pool for at least 2 weeks. Your incisions are closed with dissolvable stitches and skin glue. The glue will peel off on its own after about 1 week.    Activity: You may walk and climb stairs as tolerated. You should not lie or sit down for more than 1 hour at a time except for when sleeping at night. Activity is important to prevent blood clots. No heavy lifting of objects greater than 10 lbs (or a gallon of milk).    No driving or returning to work until you no longer require narcotic pain medications (Roxicodone).    Diet: You will be on a Phase 2 diet for the next 10-14 days. This includes sugar-free, non-carbonated clear liquids, thick liquids (like strained cream soups, sugar-free pudding, and protein shakes). After about 2 weeks or when cleared by your surgeon/dietician, you may begin the Phase 3 pureed diet (as per your Bariatric Guidebook).    Remember to drink at least 64 oz of liquids per day and try to take in 40-60 grams of protein.     Avoid straws and carbonated beverages. Take small sips every couple of minutes. Your water bottle is your best friend and you shouldn't go anywhere without it!     Warning: If you experience severe pain, fever over 101 degrees F, redness or drainage from incisions, nausea/vomiting that lasts more than 12 hours, rapid heart rate or shortness of  breath, call your surgeon immediately.       Your medication list        START taking these medications        Instructions Last Dose Given Next Dose Due   acetaminophen 325 mg tablet  Commonly known as: Tylenol      Take 2 tablets (650 mg) by mouth every 6 hours.       simethicone 80 mg chewable tablet  Commonly known as: Mylicon      Chew 1 tablet (80 mg) every 4 hours if needed for flatulence (gas pain).              CONTINUE taking these medications        Instructions Last Dose Given Next Dose Due   ALPRAZolam 0.5 mg tablet  Commonly known as: Xanax           FLUoxetine 20 mg capsule  Commonly known as: PROzac           omeprazole 40 mg DR capsule  Commonly known as: PriLOSEC      Take 1 capsule (40 mg) by mouth once daily in the morning. Take before meals. Do not crush or chew. Open capsule, sprinkle beads on SF jello, pudding or applesauce.       ondansetron ODT 4 mg disintegrating tablet  Commonly known as: Zofran-ODT      Take 1 tablet (4 mg) by mouth every 6 hours if needed for nausea or vomiting.       oxyCODONE 5 mg immediate release tablet  Commonly known as: Roxicodone      Take 1 tablet (5 mg) by mouth every 6 hours if needed for severe pain (7 - 10) or moderate pain (4 - 6) for up to 6 days.       Vitamin D3 50 MCG (2000 UT) tablet  Generic drug: cholecalciferol                  STOP taking these medications      famotidine 20 mg tablet  Commonly known as: Pepcid                  Where to Get Your Medications        Information about where to get these medications is not yet available    Ask your nurse or doctor about these medications  acetaminophen 325 mg tablet  simethicone 80 mg chewable tablet         You do not need to begin taking your vitamins until instructed by your surgeon at your first follow-up visit.    Please consult your medical doctor regarding adjusting your medications after surgery (particularly diabetic and blood pressure medications).    Follow up with Dr Salmon in her [facility] office on 6/21/24.

## 2024-06-05 NOTE — DISCHARGE SUMMARY
Discharge Diagnosis  Morbid obesity due to excess calories (Multi)    Issues Requiring Follow-Up  Pathology pending    Test Results Pending At Discharge  Pending Labs       Order Current Status    Surgical Pathology Exam In process            Hospital Course   Erick Becerril underwent an uncomplicated laparoscopic sleeve gastrectomy and hiatal hernia repair on 6/4/24 by Dr Salmon. she recovered on the regular nursing floor. she was started on a clear liquid diet. On POD#1 she had an esophagram which showed no leak or obstruction. she was then advanced to a full liquid diet. On discharge, her pain was controlled on oral pain medications, ambulating and voiding without difficulty, and she was tolerating an adequate amount of liquids by mouth. she was discharged in good condition on POD#1.      Home Medications     Medication List      START taking these medications     acetaminophen 325 mg tablet; Commonly known as: Tylenol; Take 2 tablets   (650 mg) by mouth every 6 hours.   simethicone 80 mg chewable tablet; Commonly known as: Mylicon; Chew 1   tablet (80 mg) every 4 hours if needed for flatulence (gas pain).     CONTINUE taking these medications     ALPRAZolam 0.5 mg tablet; Commonly known as: Xanax   FLUoxetine 20 mg capsule; Commonly known as: PROzac   omeprazole 40 mg DR capsule; Commonly known as: PriLOSEC; Take 1 capsule   (40 mg) by mouth once daily in the morning. Take before meals. Do not   crush or chew. Open capsule, sprinkle beads on SF jello, pudding or   applesauce.   ondansetron ODT 4 mg disintegrating tablet; Commonly known as:   Zofran-ODT; Take 1 tablet (4 mg) by mouth every 6 hours if needed for   nausea or vomiting.   oxyCODONE 5 mg immediate release tablet; Commonly known as: Roxicodone;   Take 1 tablet (5 mg) by mouth every 6 hours if needed for severe pain (7 -   10) or moderate pain (4 - 6) for up to 6 days.   Vitamin D3 50 MCG (2000 UT) tablet; Generic drug: cholecalciferol     STOP taking  these medications     famotidine 20 mg tablet; Commonly known as: Pepcid       Outpatient Follow-Up  Future Appointments   Date Time Provider Department Center   11/13/2024  8:45 AM Samuel Corcoran MD [id number]       Nacho Salmon MD

## 2024-06-05 NOTE — CARE PLAN
Problem: Pain  Goal: My pain/discomfort is manageable  Outcome: Progressing     Problem: Safety  Goal: Patient will be injury free during hospitalization  Outcome: Progressing     Problem: Daily Care  Goal: Daily care needs are met  Outcome: Progressing     Problem: Psychosocial Needs  Goal: Demonstrates ability to cope with hospitalization/illness  Outcome: Progressing     Problem: Discharge Barriers  Goal: My discharge needs are met  Outcome: Progressing   The patient's goals for the shift include      The clinical goals for the shift include pain management and ambulation

## 2024-06-05 NOTE — PROGRESS NOTES
06/05/24 1559   Discharge Planning   Living Arrangements Spouse/significant other   Support Systems Spouse/significant other;Family members;Friends/neighbors   Assistance Needed Patient is A&O X3, on room air at baseline, is independent with ADLs and uses no DME at home. Patient denies further needs upon discharge.   Type of Residence Private residence   Number of Stairs to Enter Residence 0   Number of Stairs Within Residence 7   Who is requesting discharge planning? Provider   Home or Post Acute Services None   Patient expects to be discharged to: Home no needs   Does the patient need discharge transport arranged? No   Financial Resource Strain   How hard is it for you to pay for the very basics like food, housing, medical care, and heating? Not hard   Housing Stability   In the last 12 months, was there a time when you were not able to pay the mortgage or rent on time? N   In the last 12 months, how many places have you lived? 1   In the last 12 months, was there a time when you did not have a steady place to sleep or slept in a shelter (including now)? N   Transportation Needs   In the past 12 months, has lack of transportation kept you from medical appointments or from getting medications? no   In the past 12 months, has lack of transportation kept you from meetings, work, or from getting things needed for daily living? No

## 2024-06-05 NOTE — PROGRESS NOTES
"BARIATRIC SURGERY PROGRESS NOTE    PATIENT NAME: Erick Becerril  MRN: [id number]  DATE: 6/5/2024    SUMMARY OF CURRENT STATUS  - POD # 1 s/p Sleeve Gastrectomy and hiatal hernia repair  - doing well  - esophagram shows no leak or obstruction  - tolerating clear liquids and just starting some protein shake  - denies any major abdominal pain, nausea, SOB, chest pain, leg pain    TODAY'S ASSESSMENT:  - New complications over the past 24 hours: No  - No concerns overnight  - Pain controlled: Yes  - DVT prophylaxis:  Yes - Lovenox and SCDs  - Diet is currently: Phase 1  - Nausea:  No  - Emesis:  No  - Pt has sat in the chair / ambulated    ON EXAMINATION:  /86   Pulse 53   Temp 36.8 °C (98.2 °F)   Resp 16   Ht 1.549 m (5' 1\")   Wt 118 kg (259 lb 9.5 oz)   LMP 07/29/2022   SpO2 99%   BMI 49.05 kg/m²   APPEARANCE: NAD, looks well.  ABDOMEN:  Soft, nadira-incisional tenderness, nondistended  WOUND(S):  Incisions Clean / Dry / Intact    INS/OUTS:    Intake/Output Summary (Last 24 hours) at 6/5/2024 1148  Last data filed at 6/5/2024 1000  Gross per 24 hour   Intake 1635.83 ml   Output 1300 ml   Net 335.83 ml        BLOOD WORK:  Results for orders placed or performed during the hospital encounter of 06/04/24 (from the past 24 hour(s))   Comprehensive metabolic panel   Result Value Ref Range    Glucose 123 (H) 74 - 99 mg/dL    Sodium 136 136 - 145 mmol/L    Potassium 4.3 3.5 - 5.3 mmol/L    Chloride 105 98 - 107 mmol/L    Bicarbonate 24 21 - 32 mmol/L    Anion Gap 11 10 - 20 mmol/L    Urea Nitrogen 7 6 - 23 mg/dL    Creatinine 0.70 0.50 - 1.05 mg/dL    eGFR >90 >60 mL/min/1.73m*2    Calcium 8.8 8.6 - 10.3 mg/dL    Albumin 3.9 3.4 - 5.0 g/dL    Alkaline Phosphatase 53 33 - 110 U/L    Total Protein 6.9 6.4 - 8.2 g/dL    AST 25 9 - 39 U/L    Bilirubin, Total 0.4 0.0 - 1.2 mg/dL    ALT 38 7 - 45 U/L   CBC and Auto Differential   Result Value Ref Range    WBC 11.0 4.4 - 11.3 x10*3/uL    nRBC 0.0 0.0 - 0.0 /100 WBCs    RBC " 3.92 (L) 4.00 - 5.20 x10*6/uL    Hemoglobin 10.8 (L) 12.0 - 16.0 g/dL    Hematocrit 33.7 (L) 36.0 - 46.0 %    MCV 86 80 - 100 fL    MCH 27.6 26.0 - 34.0 pg    MCHC 32.0 32.0 - 36.0 g/dL    RDW 13.6 11.5 - 14.5 %    Platelets 287 150 - 450 x10*3/uL    Neutrophils % 88.2 40.0 - 80.0 %    Immature Granulocytes %, Automated 0.5 0.0 - 0.9 %    Lymphocytes % 6.4 13.0 - 44.0 %    Monocytes % 4.8 2.0 - 10.0 %    Eosinophils % 0.0 0.0 - 6.0 %    Basophils % 0.1 0.0 - 2.0 %    Neutrophils Absolute 9.66 (H) 1.20 - 7.70 x10*3/uL    Immature Granulocytes Absolute, Automated 0.06 0.00 - 0.70 x10*3/uL    Lymphocytes Absolute 0.70 (L) 1.20 - 4.80 x10*3/uL    Monocytes Absolute 0.53 0.10 - 1.00 x10*3/uL    Eosinophils Absolute 0.00 0.00 - 0.70 x10*3/uL    Basophils Absolute 0.01 0.00 - 0.10 x10*3/uL       IMPRESSION:  - Pt is doing well / as expected s/p Bariatric Surgery  - No new issues/concerns    PLAN POD1:  - Advance to Bariatric Phase 2 diet following esophagram.  Goal 40 oz prior to discharge home, 64 oz daily of fluids.  - Multimodal pain regimen: scheduled tylenol, scheduled toradol, PRN oxycodone  - Encourage Ambulation / use of incentive spirometry  - VTE prophylaxis - Continue with SCDs and Lovenox  - Disposition - Likely home later today    Nacho Salmon MD  Bariatric and Minimally Invasive General Surgery

## 2024-06-07 NOTE — SIGNIFICANT EVENT
Follow Up Phone Call    Outgoing phone call    Spoke to: Erick Becerril Relationship:self   Phone number: [phone]      Outcome: I left a message on answering machine   No chief complaint on file.         Diagnosis:Not applicable

## 2024-06-09 ENCOUNTER — HOSPITAL ENCOUNTER (EMERGENCY)
Facility: HOSPITAL | Age: 29
Discharge: HOME | End: 2024-06-09
Attending: STUDENT IN AN ORGANIZED HEALTH CARE EDUCATION/TRAINING PROGRAM
Payer: COMMERCIAL

## 2024-06-09 VITALS
HEIGHT: 61 IN | TEMPERATURE: 97.9 F | BODY MASS INDEX: 46.56 KG/M2 | DIASTOLIC BLOOD PRESSURE: 102 MMHG | SYSTOLIC BLOOD PRESSURE: 179 MMHG | RESPIRATION RATE: 18 BRPM | OXYGEN SATURATION: 100 % | HEART RATE: 88 BPM | WEIGHT: 246.6 LBS

## 2024-06-09 DIAGNOSIS — E86.0 DEHYDRATION: Primary | ICD-10-CM

## 2024-06-09 DIAGNOSIS — R21 RASH: ICD-10-CM

## 2024-06-09 DIAGNOSIS — E16.2 HYPOGLYCEMIA: ICD-10-CM

## 2024-06-09 DIAGNOSIS — R19.7 DIARRHEA, UNSPECIFIED TYPE: ICD-10-CM

## 2024-06-09 LAB
ALBUMIN SERPL BCP-MCNC: 4.7 G/DL (ref 3.4–5)
ALP SERPL-CCNC: 74 U/L (ref 33–110)
ALT SERPL W P-5'-P-CCNC: 34 U/L (ref 7–45)
ANION GAP SERPL CALC-SCNC: 19 MMOL/L (ref 10–20)
APPEARANCE UR: CLEAR
AST SERPL W P-5'-P-CCNC: 26 U/L (ref 9–39)
BASOPHILS # BLD AUTO: 0.04 X10*3/UL (ref 0–0.1)
BASOPHILS NFR BLD AUTO: 0.4 %
BILIRUB SERPL-MCNC: 0.7 MG/DL (ref 0–1.2)
BILIRUB UR STRIP.AUTO-MCNC: NEGATIVE MG/DL
BUN SERPL-MCNC: 13 MG/DL (ref 6–23)
CALCIUM SERPL-MCNC: 9.7 MG/DL (ref 8.6–10.3)
CHLORIDE SERPL-SCNC: 101 MMOL/L (ref 98–107)
CO2 SERPL-SCNC: 20 MMOL/L (ref 21–32)
COLOR UR: ABNORMAL
CREAT SERPL-MCNC: 0.73 MG/DL (ref 0.5–1.05)
EGFRCR SERPLBLD CKD-EPI 2021: >90 ML/MIN/1.73M*2
EOSINOPHIL # BLD AUTO: 0.22 X10*3/UL (ref 0–0.7)
EOSINOPHIL NFR BLD AUTO: 2 %
ERYTHROCYTE [DISTWIDTH] IN BLOOD BY AUTOMATED COUNT: 13.4 % (ref 11.5–14.5)
GLUCOSE BLD MANUAL STRIP-MCNC: 142 MG/DL (ref 74–99)
GLUCOSE BLD MANUAL STRIP-MCNC: 58 MG/DL (ref 74–99)
GLUCOSE BLD MANUAL STRIP-MCNC: 60 MG/DL (ref 74–99)
GLUCOSE SERPL-MCNC: 70 MG/DL (ref 74–99)
GLUCOSE UR STRIP.AUTO-MCNC: NORMAL MG/DL
HCT VFR BLD AUTO: 41.6 % (ref 36–46)
HGB BLD-MCNC: 13.3 G/DL (ref 12–16)
IMM GRANULOCYTES # BLD AUTO: 0.04 X10*3/UL (ref 0–0.7)
IMM GRANULOCYTES NFR BLD AUTO: 0.4 % (ref 0–0.9)
KETONES UR STRIP.AUTO-MCNC: ABNORMAL MG/DL
LACTATE SERPL-SCNC: 0.6 MMOL/L (ref 0.4–2)
LEUKOCYTE ESTERASE UR QL STRIP.AUTO: NEGATIVE
LYMPHOCYTES # BLD AUTO: 1.8 X10*3/UL (ref 1.2–4.8)
LYMPHOCYTES NFR BLD AUTO: 16.7 %
MAGNESIUM SERPL-MCNC: 1.85 MG/DL (ref 1.6–2.4)
MCH RBC QN AUTO: 27.3 PG (ref 26–34)
MCHC RBC AUTO-ENTMCNC: 32 G/DL (ref 32–36)
MCV RBC AUTO: 85 FL (ref 80–100)
MONOCYTES # BLD AUTO: 0.59 X10*3/UL (ref 0.1–1)
MONOCYTES NFR BLD AUTO: 5.5 %
NEUTROPHILS # BLD AUTO: 8.06 X10*3/UL (ref 1.2–7.7)
NEUTROPHILS NFR BLD AUTO: 75 %
NITRITE UR QL STRIP.AUTO: NEGATIVE
NRBC BLD-RTO: 0 /100 WBCS (ref 0–0)
PH UR STRIP.AUTO: 5 [PH]
PLATELET # BLD AUTO: 390 X10*3/UL (ref 150–450)
POTASSIUM SERPL-SCNC: 4.2 MMOL/L (ref 3.5–5.3)
PROT SERPL-MCNC: 8.6 G/DL (ref 6.4–8.2)
PROT UR STRIP.AUTO-MCNC: NEGATIVE MG/DL
RBC # BLD AUTO: 4.87 X10*6/UL (ref 4–5.2)
RBC # UR STRIP.AUTO: NEGATIVE /UL
SODIUM SERPL-SCNC: 136 MMOL/L (ref 136–145)
SP GR UR STRIP.AUTO: 1.01
UROBILINOGEN UR STRIP.AUTO-MCNC: NORMAL MG/DL
WBC # BLD AUTO: 10.8 X10*3/UL (ref 4.4–11.3)

## 2024-06-09 PROCEDURE — 80053 COMPREHEN METABOLIC PANEL: CPT | Performed by: NURSE PRACTITIONER

## 2024-06-09 PROCEDURE — 87075 CULTR BACTERIA EXCEPT BLOOD: CPT | Mod: 59,GEALAB | Performed by: NURSE PRACTITIONER

## 2024-06-09 PROCEDURE — 87040 BLOOD CULTURE FOR BACTERIA: CPT | Mod: GEALAB | Performed by: NURSE PRACTITIONER

## 2024-06-09 PROCEDURE — 81003 URINALYSIS AUTO W/O SCOPE: CPT | Performed by: NURSE PRACTITIONER

## 2024-06-09 PROCEDURE — 82947 ASSAY GLUCOSE BLOOD QUANT: CPT

## 2024-06-09 PROCEDURE — 85025 COMPLETE CBC W/AUTO DIFF WBC: CPT | Performed by: NURSE PRACTITIONER

## 2024-06-09 PROCEDURE — 99284 EMERGENCY DEPT VISIT MOD MDM: CPT | Mod: 25

## 2024-06-09 PROCEDURE — 36415 COLL VENOUS BLD VENIPUNCTURE: CPT | Performed by: NURSE PRACTITIONER

## 2024-06-09 PROCEDURE — 83735 ASSAY OF MAGNESIUM: CPT | Performed by: NURSE PRACTITIONER

## 2024-06-09 PROCEDURE — 2500000004 HC RX 250 GENERAL PHARMACY W/ HCPCS (ALT 636 FOR OP/ED): Performed by: NURSE PRACTITIONER

## 2024-06-09 PROCEDURE — 83605 ASSAY OF LACTIC ACID: CPT | Performed by: NURSE PRACTITIONER

## 2024-06-09 PROCEDURE — 82947 ASSAY GLUCOSE BLOOD QUANT: CPT | Mod: 59

## 2024-06-09 PROCEDURE — 2500000005 HC RX 250 GENERAL PHARMACY W/O HCPCS: Performed by: NURSE PRACTITIONER

## 2024-06-09 PROCEDURE — 96374 THER/PROPH/DIAG INJ IV PUSH: CPT

## 2024-06-09 PROCEDURE — 2500000001 HC RX 250 WO HCPCS SELF ADMINISTERED DRUGS (ALT 637 FOR MEDICARE OP): Performed by: NURSE PRACTITIONER

## 2024-06-09 RX ORDER — HYDROCORTISONE 1 %
CREAM (GRAM) TOPICAL ONCE
Status: COMPLETED | OUTPATIENT
Start: 2024-06-09 | End: 2024-06-09

## 2024-06-09 RX ORDER — DEXTROSE 50 % IN WATER (D50W) INTRAVENOUS SYRINGE
25 ONCE
Status: COMPLETED | OUTPATIENT
Start: 2024-06-09 | End: 2024-06-09

## 2024-06-09 RX ADMIN — HYDROCORTISONE: 1 CREAM TOPICAL at 19:30

## 2024-06-09 RX ADMIN — SODIUM CHLORIDE 1000 ML: 9 INJECTION, SOLUTION INTRAVENOUS at 17:01

## 2024-06-09 RX ADMIN — DEXTROSE MONOHYDRATE 25 G: 25 INJECTION, SOLUTION INTRAVENOUS at 18:49

## 2024-06-09 RX ADMIN — SODIUM CHLORIDE 1000 ML: 9 INJECTION, SOLUTION INTRAVENOUS at 18:00

## 2024-06-09 ASSESSMENT — LIFESTYLE VARIABLES
EVER FELT BAD OR GUILTY ABOUT YOUR DRINKING: NO
TOTAL SCORE: 0
HAVE YOU EVER FELT YOU SHOULD CUT DOWN ON YOUR DRINKING: NO
EVER HAD A DRINK FIRST THING IN THE MORNING TO STEADY YOUR NERVES TO GET RID OF A HANGOVER: NO
HAVE PEOPLE ANNOYED YOU BY CRITICIZING YOUR DRINKING: NO

## 2024-06-09 ASSESSMENT — PAIN DESCRIPTION - LOCATION: LOCATION: ABDOMEN

## 2024-06-09 ASSESSMENT — PAIN - FUNCTIONAL ASSESSMENT: PAIN_FUNCTIONAL_ASSESSMENT: 0-10

## 2024-06-09 ASSESSMENT — PAIN SCALES - GENERAL: PAINLEVEL_OUTOF10: 2

## 2024-06-09 NOTE — ED PROVIDER NOTES
[facility] [place], [place]  [facility]  ED  Encounter Note  Admit Date/RoomTime: 2024  4:14 PM  ED Room: [place] [address]  NAME: Erick Becerril  : 1995  MRN: [id number]     Chief Complaint:  multiple medical complaints (Tuesday had surgery with Dr Salmon now hacing red itchy hot rash around surgical site, diarrhea and dehydration Denies vomiting )    HISTORY OF PRESENT ILLNESS        Erick Becerril is a 29 y.o. female who presents to the ED for evaluation of post op issues.    Patient stated that she had gastric sleeve surgery on Tuesday by Dr Salmon. Stayed overnight which is part of the routine.    Patient stated that she felt okay after surgery. Has been on her clear liquid and protein shakes. She stated that she started getting diarrhea and feels overall dehydrated last 1-2 days. She had 2 episodes of liquid diarrhea today. Is now on PPI since the surgery which is new. Had been on Pepcid. Denied urinary issues or concerns.    She now has blotchy hot and at times itchy rash which started on left side of abdomen and is now covering her abdomen. Denied fever or chills. Denied hx of mrsa. Mom had it before. She has 6 children.  No known drug allergies.     ROS   Pertinent positives and negatives are stated within HPI, all other systems reviewed and are negative.    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    Surgical History:  has a past surgical history that includes Dante tooth extraction; Cholecystectomy; Tubal ligation; Hysterectomy; and Ankle arthroscopy w/ open repair (Right, 2023).    Social History:   reports that she has never smoked. She has never been exposed to tobacco smoke. She has never used smokeless tobacco. She reports that she does not currently use alcohol. She reports that she does not use drugs.    Family History: family history includes Diabetes in her paternal grandfather; No Known Problems in her father; Skin cancer in her mother.     Allergies: Patient has no known allergies.    PHYSICAL EXAM   Oxygen Saturation Interpretation: Normal.         Physical Exam  Constitutional/General: Alert and oriented x3, well appearing, non toxic  HEENT:  NC/NT. PERRLA.  Airway patent.  Neck: Supple, full ROM. No midline vertebral tenderness or crepitus.   Respiratory: Lung sounds clear to auscultation bilaterally. No wheezes, rhonchi or stridor. Not in respiratory distress.  CV:  Regular rate. Regular rhythm. No murmurs or rubs. 2+ distal pulses.  GI:  Abdomen soft, non-tender, non-distended. +BS. No rebound, guarding, or rigidity. No pulsatile masses.  Musculoskeletal: Moves all extremities x 4. Warm and well perfused. Capillary refill <3 seconds  Integument: Skin warm and dry. No rashes.   Neurologic: Alert and oriented with no focal deficits, symmetric strength 5/5 in the upper and lower extremities bilaterally.  Psychiatric: Normal affect.  Multiple blotchy red areas on stomach which are hot to touch. Trochar sites on dry clean and intact. No pain with palpitation.     Lab / Imaging Results   (All laboratory and radiology results have been personally reviewed by myself)  Labs:  Results for orders placed or performed during the hospital encounter of 06/09/24   CBC and Auto Differential   Result Value Ref Range    WBC 10.8 4.4 - 11.3 x10*3/uL    nRBC 0.0 0.0 - 0.0 /100 WBCs    RBC 4.87 4.00 - 5.20 x10*6/uL    Hemoglobin 13.3 12.0 - 16.0 g/dL    Hematocrit 41.6 36.0 - 46.0 %    MCV 85 80 - 100 fL    MCH 27.3 26.0 - 34.0 pg    MCHC 32.0 32.0 - 36.0 g/dL    RDW 13.4 11.5 - 14.5 %    Platelets 390 150 - 450  x10*3/uL    Neutrophils % 75.0 40.0 - 80.0 %    Immature Granulocytes %, Automated 0.4 0.0 - 0.9 %    Lymphocytes % 16.7 13.0 - 44.0 %    Monocytes % 5.5 2.0 - 10.0 %    Eosinophils % 2.0 0.0 - 6.0 %    Basophils % 0.4 0.0 - 2.0 %    Neutrophils Absolute 8.06 (H) 1.20 - 7.70 x10*3/uL    Immature Granulocytes Absolute, Automated 0.04 0.00 - 0.70 x10*3/uL    Lymphocytes Absolute 1.80 1.20 - 4.80 x10*3/uL    Monocytes Absolute 0.59 0.10 - 1.00 x10*3/uL    Eosinophils Absolute 0.22 0.00 - 0.70 x10*3/uL    Basophils Absolute 0.04 0.00 - 0.10 x10*3/uL   Magnesium   Result Value Ref Range    Magnesium 1.85 1.60 - 2.40 mg/dL   Comprehensive metabolic panel   Result Value Ref Range    Glucose 70 (L) 74 - 99 mg/dL    Sodium 136 136 - 145 mmol/L    Potassium 4.2 3.5 - 5.3 mmol/L    Chloride 101 98 - 107 mmol/L    Bicarbonate 20 (L) 21 - 32 mmol/L    Anion Gap 19 10 - 20 mmol/L    Urea Nitrogen 13 6 - 23 mg/dL    Creatinine 0.73 0.50 - 1.05 mg/dL    eGFR >90 >60 mL/min/1.73m*2    Calcium 9.7 8.6 - 10.3 mg/dL    Albumin 4.7 3.4 - 5.0 g/dL    Alkaline Phosphatase 74 33 - 110 U/L    Total Protein 8.6 (H) 6.4 - 8.2 g/dL    AST 26 9 - 39 U/L    Bilirubin, Total 0.7 0.0 - 1.2 mg/dL    ALT 34 7 - 45 U/L   Lactate   Result Value Ref Range    Lactate 0.6 0.4 - 2.0 mmol/L   Urinalysis with Reflex Culture and Microscopic   Result Value Ref Range    Color, Urine Light-Yellow Light-Yellow, Yellow, Dark-Yellow    Appearance, Urine Clear Clear    Specific Gravity, Urine 1.015 1.005 - 1.035    pH, Urine 5.0 5.0, 5.5, 6.0, 6.5, 7.0, 7.5, 8.0    Protein, Urine NEGATIVE NEGATIVE, 10 (TRACE), 20 (TRACE) mg/dL    Glucose, Urine Normal Normal mg/dL    Blood, Urine NEGATIVE NEGATIVE    Ketones, Urine OVER (4+) (A) NEGATIVE mg/dL    Bilirubin, Urine NEGATIVE NEGATIVE    Urobilinogen, Urine Normal Normal mg/dL    Nitrite, Urine NEGATIVE NEGATIVE    Leukocyte Esterase, Urine NEGATIVE NEGATIVE   POCT GLUCOSE   Result Value Ref Range    POCT Glucose 60  (L) 74 - 99 mg/dL   POCT GLUCOSE   Result Value Ref Range    POCT Glucose 58 (L) 74 - 99 mg/dL   POCT GLUCOSE   Result Value Ref Range    POCT Glucose 142 (H) 74 - 99 mg/dL     Imaging:  All Radiology results interpreted by Radiologist unless otherwise noted.  No orders to display       ED Course / Medical Decision Making     Medications   sodium chloride 0.9 % bolus 1,000 mL (0 mL intravenous Stopped 6/9/24 1731)   sodium chloride 0.9 % bolus 1,000 mL (0 mL intravenous Stopped 6/9/24 1830)   dextrose 50 % injection 25 g (25 g intravenous Given 6/9/24 1849)   hydrocortisone 1 % cream ( Topical Given 6/9/24 1930)     Diagnoses as of 06/09/24 2051   Dehydration   Diarrhea, unspecified type   Rash   Hypoglycemia     Re-examination:    Patient’s condition stable.    Consult(s):   Dr Salmon      MDM:       Erick Becerril is a 29 y.o. female who presents to the ED for evaluation of post op issues.    Patient stated that she had gastric sleeve surgery on Tuesday by Dr Salmon. Stayed overnight which is part of the routine.    Patient stated that she felt okay after surgery. Has been on her clear liquid and protein shakes. She stated that she started getting diarrhea and feels overall dehydrated last 1-2 days. She had 2 episodes of liquid diarrhea today. Is now on PPI since the surgery which is new. Had been on Pepcid. Denied urinary issues or concerns.    She now has blotchy hot and at times itchy rash which started on left side of abdomen and is now covering her abdomen. Denied fever or chills. Denied hx of mrsa. Mom had it before. She has 6 children.  No known drug allergies.     ED course stable  Labs stable. Pt glucose level are on the lower side. She is not able to have glucose per surgeon due to her gastric sleeve. She is able to have protein shakes. She feels okay. She recognizes at time her sugar is low and feels shaky since the surgery. She felt better after the iv fluids. Unable to produce a stool specimen.    Rash  appears to be allergic reaction, she will apply thin dose of hydrocortisone cream to the affected area.     Dr Salmon as updated who will have her office call pt this week.    Return if needed. Otherwise labs stable. No imaging completed. Vss stable. Patient felt comfortable going home.  Ddx: dehydration       Plan of Care/Counseling:  I reviewed today's visit with the patient  and son in addition to providing specific details for the plan of care and counseling regarding the diagnosis and prognosis.  Questions are answered at this time and are agreeable with the plan.    ASSESSMENT     1. Dehydration    2. Diarrhea, unspecified type    3. Rash    4. Hypoglycemia      PLAN   Home Advised to return for signs of head injury, weakness, numbness or tingling to extremities, incontinence and Advised to return for worsening or additional problems such as abdominal or chest pain  Diagnostic tests were reviewed and questions answered. Diagnosis, care plan and treatment options were discussed. The patient and spouse/SO understand instructions and will follow up as directed.  Condition stable  The patient and spouse was given verbal follow-up instructions  Patient condition is good    New Medications     New Medications Ordered This Visit   Medications    sodium chloride 0.9 % bolus 1,000 mL    sodium chloride 0.9 % bolus 1,000 mL    dextrose 50 % injection 25 g    hydrocortisone 1 % cream     Electronically signed by UMA Waddell   **This report was transcribed using voice recognition software. Every effort was made to ensure accuracy; however, inadvertent computerized transcription errors may be present.  END OF ED PROVIDER NOTE     UMA Waddell  06/09/24 2051

## 2024-06-09 NOTE — ED TRIAGE NOTES
Patient here for Tuesday had surgery with Dr Salmon now hacing red itchy hot rash around surgical site, diarrhea and dehydration Denies vomiting

## 2024-06-10 LAB
HOLD SPECIMEN: NORMAL
LABORATORY COMMENT REPORT: NORMAL
PATH REPORT.FINAL DX SPEC: NORMAL
PATH REPORT.GROSS SPEC: NORMAL
PATH REPORT.RELEVANT HX SPEC: NORMAL
PATH REPORT.TOTAL CANCER: NORMAL

## 2024-06-12 ENCOUNTER — APPOINTMENT (OUTPATIENT)
Dept: SURGERY | Facility: CLINIC | Age: 29
End: 2024-06-12
Payer: COMMERCIAL

## 2024-06-12 VITALS — HEIGHT: 62 IN | BODY MASS INDEX: 44.64 KG/M2 | WEIGHT: 242.6 LBS

## 2024-06-12 DIAGNOSIS — Z98.84 S/P LAPAROSCOPIC SLEEVE GASTRECTOMY: Primary | ICD-10-CM

## 2024-06-12 NOTE — PROGRESS NOTES
"Follow Up Bariatric Nutrition Assessment    Name: Erick Becerril  MRN: [id number]  Date: 06/12/24    Surgery Date:  6/04/24  Surgeon: Viky  Procedure:  sleeve gastrectomy    ASSESSMENT:  Current weight in pounds:    Vitals:    06/12/24 1434   Weight: 110 kg (242 lb 9.6 oz)    Ht:   1.575 m (5' 2\")     BMI: Body mass index is 44.37 kg/m².  Previous weight in pounds: 246.7  Initial start weight in pounds:  261.0  10/11/23  EBW in pounds: 125  Total weight change in pounds: 18.4  %EBW Lost: 14.7    PROGRESS:  Nutrition Interventions for last encounter (date):   Drink 64 oz of fluid daily  Drink enough protein shakes to meet your goal of 60-70 g of protein per day  Take 2 chewable multivitamins, 1665-2594 mg of calcium citrate, and 500 mcg of B12 daily  Get up and walk frequently throughout the day.     CHANGES IN TREATMENT:   Patient met goals: partially    24 hour food recall: 45 oz. fluid and 52 grams protein  Beverages: Premier protein shake, smooth yogurt, sf pudding, Gatorade   Alcohol: no    Vitamins: 0 MVI  (has Bariatric Fusion with iron) ,  0 mg Calcium ( will be in MVI), and B12 (will be in MVI)  Medications:   Current Outpatient Medications:     acetaminophen (Tylenol) 325 mg tablet, Take 2 tablets (650 mg) by mouth every 6 hours., Disp: , Rfl:     ALPRAZolam (Xanax) 0.5 mg tablet, take 1 to 2 tablets by mouth once daily if needed for anxiety, Disp: , Rfl:     cholecalciferol (Vitamin D3) 50 MCG (2000 UT) tablet, Take 1 tablet (2,000 Units) by mouth once daily., Disp: , Rfl:     FLUoxetine (PROzac) 20 mg capsule, Take 1 capsule (20 mg) by mouth once daily., Disp: , Rfl:     omeprazole (PriLOSEC) 40 mg DR capsule, Take 1 capsule (40 mg) by mouth once daily in the morning. Take before meals. Do not crush or chew. Open capsule, sprinkle beads on SF jello, pudding or applesauce., Disp: 30 capsule, Rfl: 5    ondansetron ODT (Zofran-ODT) 4 mg disintegrating tablet, Take 1 tablet (4 mg) by mouth every 6 hours if " Patient scheduled for DME set up needed for nausea or vomiting., Disp: 15 tablet, Rfl: 1    simethicone (Mylicon) 80 mg chewable tablet, Chew 1 tablet (80 mg) every 4 hours if needed for flatulence (gas pain)., Disp: , Rfl:     Physical Activity: walking around 30 min. daily    READINESS TO LEARN:  Motivation to learn:         Interested     Understanding of instruction: Good  Anticipated Compliance:  Good  Family Support: Unable to assess-family not present     The pt is 1 week post op sleeve gastrectomy.    Patient has lost 18.4 pounds since initial assessment accounting for 14.7% loss excess body weight.  Patient is tolerating full liquid diet.  Protein intake is not quite adequate for post-op individual. Fluid consumption is not quite adequate. Patient is working on supplementing recommended vitamin/minerals. Pt states she is feeling well and is not having any difficulties. Blood sugar has been stable since adding Gatorade.  Reviewed the puree and soft foods.   Reminded pt. to eat slowly, chew thoroughly and to try one new food at a time.    Malnutrition Screening  Significant unintentional weight loss? n/a  Eating less than 75% of usual intake for more than 2 weeks? n/a      Nutrition Diagnosis:   Increased protein and nutrition needs related to altered GI function as evidenced by pt. s/p sleeve gastrectomy/gastric bypass.  Food- and nutrition-related knowledge deficit related to lack of prior exposure to surgical weight loss information as evidenced by diet recall.     Nutrition Interventions:   Modify type and amount of food and nutrients within meals and snacks.  Comprehensive Nutrition Education  -Nutrition education materials: none     Recommendations:    Continue to drink your protein shakes to meet your goal of 60-70 g of protein per day. Begin measuring how much protein you can eat on the soft diet so that you know when to start weaning off of your protein shakes.   Continue to drink 64 oz. of zero calorie beverages per day  Continue  no drinking 30 min before, during the meal and for 30 minutes after the meal  Continue to exercise   Advance to the puree diet in 1 week and follow for 2 weeks, then soft food for 2 weeks  Remember to eat slowly and chew thoroughly  Try one new food at a time to test for any intolerances.   Continue to take all of your vitamins and minerals.   Attend support groups as needed.    Nutrition Monitoring and Evaluation:   1-2 pounds weight loss per week  Criteria: weight check, food recall  Need for Follow-up: 6 weeks post op

## 2024-06-13 LAB
BACTERIA BLD CULT: NORMAL
BACTERIA BLD CULT: NORMAL

## 2024-06-19 ENCOUNTER — TELEPHONE (OUTPATIENT)
Dept: SURGERY | Facility: CLINIC | Age: 29
End: 2024-06-19
Payer: COMMERCIAL

## 2024-06-21 ENCOUNTER — APPOINTMENT (OUTPATIENT)
Dept: SURGERY | Facility: CLINIC | Age: 29
End: 2024-06-21
Payer: COMMERCIAL

## 2024-06-21 VITALS
DIASTOLIC BLOOD PRESSURE: 70 MMHG | SYSTOLIC BLOOD PRESSURE: 112 MMHG | HEART RATE: 78 BPM | WEIGHT: 238.9 LBS | RESPIRATION RATE: 18 BRPM | BODY MASS INDEX: 43.96 KG/M2 | HEIGHT: 62 IN | OXYGEN SATURATION: 97 %

## 2024-06-21 DIAGNOSIS — K21.00 HIATAL HERNIA WITH GERD AND ESOPHAGITIS: Primary | ICD-10-CM

## 2024-06-21 DIAGNOSIS — K44.9 HIATAL HERNIA WITH GERD AND ESOPHAGITIS: Primary | ICD-10-CM

## 2024-06-21 PROCEDURE — 3008F BODY MASS INDEX DOCD: CPT | Performed by: SURGERY

## 2024-06-21 PROCEDURE — 99024 POSTOP FOLLOW-UP VISIT: CPT | Performed by: SURGERY

## 2024-06-21 PROCEDURE — 1036F TOBACCO NON-USER: CPT | Performed by: SURGERY

## 2024-06-21 ASSESSMENT — PAIN SCALES - GENERAL: PAINLEVEL: 0-NO PAIN

## 2024-06-21 NOTE — PROGRESS NOTES
BARIATRIC SURGERY CLINIC  FOLLOW UP NOTE      Name: Erick Becerril  MRN: [id number]      Index Surgery  Date of Surgery: 6/4/24   Surgeon: Nacho Salmon MD  Surgical Procedure: Laparoscopic sleeve gastrectomy  36705 and Hiatal hernia repair [id number]  Initial weight: 261 lbs  Pre-surgical weight: 267 lbs      Other Bariatric Surgeries  none    Visit: 3    weeks  Today's Visit:   Wt Readings from Last 1 Encounters:   06/21/24 108 kg (238 lb 14.4 oz)    Body mass index is 43.7 kg/m².   Last Visit:    Wt Readings from Last 3 Encounters:   06/21/24 108 kg (238 lb 14.4 oz)   06/12/24 110 kg (242 lb 9.6 oz)   06/09/24 112 kg (246 lb 9.6 oz)       Total weight loss: 29 lbs    HPI: struggling with the consistency of pureed foods. It all sits well,but the texture in her mouth bothers her. She is tired of protein shakes.     DIET INTAKE: Pureed    Diet History:      Fluid intake: 64 oz/day      DAILY SUPPLEMENTS:  Calcium: Calcium Citrate w/ vitamin D (1200 - 1500mg)  Multivitamin & Minerals: 1 per day - bariatric pal combo vitamin  Iron Supplement: included in multi-vitamin  Vitamin B12: included in multi-vitamin  Vitamin D3: included in multi-vitamin  Other: none  PPI:omeprazole    EXERCISE: walking    Symptoms:      Nausea/vomiting: denies   Food intolerance: denies   Constipation: denies   Diarrhea: denies   Experiencing dumping: denies   Abdominal pain: denies   Reflux: denies Are you on medications: omeprazole      Current Outpatient Medications   Medication Sig Dispense Refill    acetaminophen (Tylenol) 325 mg tablet Take 2 tablets (650 mg) by mouth every 6 hours.      ALPRAZolam (Xanax) 0.5 mg tablet take 1 to 2 tablets by mouth once daily if needed for anxiety      cholecalciferol (Vitamin D3) 50 MCG (2000 UT) tablet Take 1 tablet (2,000 Units) by mouth once daily.      FLUoxetine (PROzac) 20 mg capsule Take 1 capsule (20 mg) by mouth once daily.      omeprazole (PriLOSEC) 40 mg DR capsule Take 1 capsule (40 mg) by mouth  "once daily in the morning. Take before meals. Do not crush or chew. Open capsule, sprinkle beads on SF jello, pudding or applesauce. 30 capsule 5    ondansetron ODT (Zofran-ODT) 4 mg disintegrating tablet Take 1 tablet (4 mg) by mouth every 6 hours if needed for nausea or vomiting. 15 tablet 1    simethicone (Mylicon) 80 mg chewable tablet Chew 1 tablet (80 mg) every 4 hours if needed for flatulence (gas pain).       No current facility-administered medications for this visit.       Comorbidities:  No problem-specific Assessment & Plan notes found for this encounter.        REVIEW OF SYSTEMS:  CONSTITUTIONAL: Patient denies fevers, chills, sweats and weight changes.  EYES: Patient denies any visual symptoms.  EARS, NOSE, AND THROAT: No difficulties with hearing. No symptoms of rhinitis or sore throat.  CARDIOVASCULAR: Patient denies chest pains, palpitations, orthopnea and paroxysmal nocturnal dyspnea.  RESPIRATORY: No dyspnea on exertion, no wheezing or cough.  GI: No nausea, vomiting, diarrhea, constipation, abdominal pain, hematochezia or melena.  : No urinary hesitancy or dribbling. No nocturia or urinary frequency. No abnormal urethral discharge.  MUSCULOSKELETAL: No myalgias or arthralgias.  NEUROLOGIC: No chronic headaches, no seizures. Patient denies numbness, tingling or weakness.  PSYCHIATRIC: Patient denies problems with mood disturbance. No problems with anxiety.  ENDOCRINE: No excessive urination or excessive thirst.  DERMATOLOGIC: Patient denies any rashes or skin changes.    PHYSICAL EXAM:  /70   Pulse 78   Resp 18   Ht 1.575 m (5' 2\")   Wt 108 kg (238 lb 14.4 oz)   LMP 07/29/2022   SpO2 97%   BMI 43.70 kg/m²   GENERAL: Obese. No apparent distress. Pt is alert and oriented x3.  HEENT: Head is normocephalic and atraumatic. Extraocular muscles are intact. Pupils are equal, round, and reactive to light and accommodation. Nares appeared normal. Mouth is well hydrated and without lesions. " Mucous membranes are moist. Posterior pharynx clear of any exudate or lesions.  NECK: Supple. No carotid bruits. No lymphadenopathy or thyromegaly.  LUNGS: Clear to auscultation.  HEART: Regular rate and rhythm without murmur.  ABDOMEN: Soft, nontender, and nondistended. Positive bowel sounds. No hepatosplenomegaly was noted.  EXTREMITIES: Without any cyanosis, clubbing, rash, lesions or edema.  NEUROLOGIC: Cranial nerves II through XII are grossly intact.  PSYCHIATRIC: Flat affect, but denies suicidal or homicidal ideations.  SKIN: No ulceration or induration present.      A/P: Normal post-OP course    No sign of infection, Doing well, Excellent Pain control, tolerating diet, and Comorbidities improved: GERD    Energy: Energy good    Weight loss: Steady    Recommendations: follow up sleep study, counseled on exercise, No heavy lifting > 10 lbs for: 1 more week, Fluid intake 60 oz/day, Protein 60 g/day, counseled on diet: discussed pureed diet options and advancing to a soft diet on Monday, and Continue taking vitamins as directed.    Follow up: 4 weeks    Nacho Salmon MD  Bariatric and Minimally Invasive General Surgery

## 2024-07-09 NOTE — PROGRESS NOTES
"Follow Up Bariatric Nutrition Assessment    Name: Erick Becerril  MRN: [id number]  Date: 07/18/24     Surgery Date: 6/04/24  Surgeon: Viky  Procedure: sleeve gastrectomy    ASSESSMENT:  Current weight in pounds:     Vitals:    07/18/24 1135   Weight: 105 kg (232 lb 6.4 oz)      Ht:  1.575 m (5' 2\")   BMI:  Body mass index is 42.51 kg/m².  Previous weight in pounds: 246.7  Initial start weight in pounds: 261.0  10/11/23  EBW in pounds: 125.0  Total weight change in pounds: 28.6  %EBW Lost: 22.9    PROGRESS:  Nutrition Interventions for last encounter   Continue to drink your protein shakes to meet your goal of 60-70 g of protein per day. Begin measuring how much protein you can eat on the soft diet so that you know when to start weaning off of your protein shakes.   Continue to drink 64 oz. of zero calorie beverages per day  Continue no drinking 30 min before, during the meal and for 30 minutes after the meal  Continue to exercise  Advance to the puree diet for 1 week, then soft food for 3 weeks  Remember to eat slowly and chew thoroughly  Try one new food at a time to test for any intolerances.   Continue to take all of your vitamins and minerals.     CHANGES IN TREATMENT:   Patient met goals: partially   24 hour food recall: 64 oz. fluid and 43 grams protein  Breakfast: 1/2 c. Low fat cottage cheese and 1/4 c. blueberries  Snack: no  Lunch: Premier protein shake  Snack:  sf pudding 1/2 serving   Dinner:  1/2 cup cooked mixed vegetables  Snack: no  Beverages:  water, Premier protein shake  Alcohol: no    Vitamins: : MVI: Bariatric Fusion with iron 4 times daily; 1260 mg. calcium  in MVI), and B12  (in MVI)   Medications:   Current Outpatient Medications:     acetaminophen (Tylenol) 325 mg tablet, Take 2 tablets (650 mg) by mouth every 6 hours., Disp: , Rfl:     ALPRAZolam (Xanax) 0.5 mg tablet, take 1 to 2 tablets by mouth once daily if needed for anxiety, Disp: , Rfl:     cholecalciferol (Vitamin D3) 50 MCG (2000 UT) " tablet, Take 1 tablet (2,000 Units) by mouth once daily., Disp: , Rfl:     FLUoxetine (PROzac) 20 mg capsule, Take 1 capsule (20 mg) by mouth once daily., Disp: , Rfl:     omeprazole (PriLOSEC) 40 mg DR capsule, Take 1 capsule (40 mg) by mouth once daily in the morning. Take before meals. Do not crush or chew. Open capsule, sprinkle beads on SF jello, pudding or applesauce., Disp: 30 capsule, Rfl: 5    ondansetron ODT (Zofran-ODT) 4 mg disintegrating tablet, Take 1 tablet (4 mg) by mouth every 6 hours if needed for nausea or vomiting., Disp: 15 tablet, Rfl: 1    simethicone (Mylicon) 80 mg chewable tablet, Chew 1 tablet (80 mg) every 4 hours if needed for flatulence (gas pain)., Disp: , Rfl:     Physical Activity: Walking daily 30-40 min.    READINESS TO LEARN:  Motivation to learn:          Interested      Understanding of instruction:  Good      Family Support: Unable to assess-family not present      The pt is 6 weeks postop sleeve gastrectomy.   Patient has lost 28.6 pounds since initial assessment accounting for 22.9% loss excess body weight.  Tolerating soft diet without difficulty.  Protein intake is adequate for post-op individual. Fluid consumption is adequate. Patient is supplementing recommended vitamin/minerals.   Pt states concerns/difficulties with meeting protein goals but feels she can supplement intake with Greek yogurt or cottage cheese to reach goal.  She has not been having any nausea, vomiting or constipation.  Discussed the transition diet.    Reminded pt to eat slowly, chew thoroughly and to try on new food at a time.     Malnutrition Screening  Significant unintentional weight loss? n/a  Eating less than 75% of usual intake for more than 2 weeks? n/a    Nutrition Diagnosis:   Increased protein and nutrition needs related to altered GI function as evidenced by pt. s/p sleeve gastrectomy.  Food- and nutrition-related knowledge deficit related to lack of prior exposure to surgical weight loss  information as evidenced by diet recall.     Nutrition Interventions:   Modify type and amount of food and nutrients within meals and snacks.  Comprehensive Nutrition Education  -Nutrition education materials: none   Recommendations:    1.   Continue to work on eating 60-70 g of protein per day.  Focus meals on protein first, then vegetables.  Try Greek yogurt, low fat cottage cheese, eggs, slices turkey or skinless chicken for meals and snacks.  2.   Continue to drink 64 oz. of zero calorie beverages per day.  3.   Continue no drinking 30 min before, during the meal and for 30 minutes after the meal        4.   Increase intensity and duration of exercise.  5.   Advance to transition diet. Try raw veggies, fresh fruit and lean ground beef.   6.   Eat slowly, chew thoroughly.  7.   Try one new food at a time.         8.   Continue current vit/min regimen.  9.   Attend support groups as needed.    Nutrition Monitoring and Evaluation:   1-2 pounds weight loss per week  Criteria: weight check, food recall  Need for Follow-up: 3 months post-op

## 2024-07-18 ENCOUNTER — APPOINTMENT (OUTPATIENT)
Dept: SURGERY | Facility: CLINIC | Age: 29
End: 2024-07-18
Payer: COMMERCIAL

## 2024-07-18 VITALS — BODY MASS INDEX: 42.76 KG/M2 | HEIGHT: 62 IN | WEIGHT: 232.4 LBS

## 2024-07-23 ENCOUNTER — TELEPHONE (OUTPATIENT)
Dept: SURGERY | Facility: CLINIC | Age: 29
End: 2024-07-23
Payer: COMMERCIAL

## 2024-07-23 NOTE — TELEPHONE ENCOUNTER
Thank you for speaking with me earlier today & confirming your scheduled visit with Dr. Salmon on 7/25/24 8:45 AM at [facility] [address] ([address]) [place] [address] (inside [facility], main floor in the [facility]).  Please note our clinic exam rooms are air conditioned so you may want to bring a jacket.   Parking is available at a cost of $5.00 at the Main Entrance.  We look forward to seeing you!  Adri Kaufman, [facility] Nurse.

## 2024-07-25 ENCOUNTER — APPOINTMENT (OUTPATIENT)
Dept: SURGERY | Facility: CLINIC | Age: 29
End: 2024-07-25
Payer: COMMERCIAL

## 2024-07-25 VITALS
BODY MASS INDEX: 42.95 KG/M2 | DIASTOLIC BLOOD PRESSURE: 73 MMHG | RESPIRATION RATE: 18 BRPM | SYSTOLIC BLOOD PRESSURE: 112 MMHG | OXYGEN SATURATION: 98 % | HEIGHT: 61 IN | WEIGHT: 227.5 LBS | HEART RATE: 72 BPM

## 2024-07-25 DIAGNOSIS — K21.00 HIATAL HERNIA WITH GERD AND ESOPHAGITIS: ICD-10-CM

## 2024-07-25 DIAGNOSIS — Z90.3 INTESTINAL MALABSORPTION FOLLOWING GASTRECTOMY (HHS-HCC): ICD-10-CM

## 2024-07-25 DIAGNOSIS — K91.2 INTESTINAL MALABSORPTION FOLLOWING GASTRECTOMY (HHS-HCC): ICD-10-CM

## 2024-07-25 DIAGNOSIS — Z98.84 S/P LAPAROSCOPIC SLEEVE GASTRECTOMY: Primary | ICD-10-CM

## 2024-07-25 DIAGNOSIS — K44.9 HIATAL HERNIA WITH GERD AND ESOPHAGITIS: ICD-10-CM

## 2024-07-25 PROCEDURE — 99024 POSTOP FOLLOW-UP VISIT: CPT | Performed by: SURGERY

## 2024-07-25 PROCEDURE — 1036F TOBACCO NON-USER: CPT | Performed by: SURGERY

## 2024-07-25 PROCEDURE — 3008F BODY MASS INDEX DOCD: CPT | Performed by: SURGERY

## 2024-07-25 ASSESSMENT — PAIN SCALES - GENERAL: PAINLEVEL: 3

## 2024-07-25 NOTE — PROGRESS NOTES
BARIATRIC SURGERY CLINIC  FOLLOW UP NOTE      Name: Erick Becerril  MRN: [id number]      Index Surgery  Date of Surgery: 6/4/24   Surgeon: Nacho Salmon MD  Surgical Procedure: Laparoscopic sleeve gastrectomy  69883 and Hiatal hernia repair 30659  Initial weight: 261 lbs  Pre-surgical weight: 267 lbs      Other Bariatric Surgeries  none    Visit: 7    weeks  Today's Visit:   Wt Readings from Last 1 Encounters:   07/25/24 103 kg (227 lb 8 oz)    Body mass index is 42.99 kg/m².   Last Visit:    Wt Readings from Last 3 Encounters:   07/25/24 103 kg (227 lb 8 oz)   07/18/24 105 kg (232 lb 6.4 oz)   06/21/24 108 kg (238 lb 14.4 oz)       Total weight loss: 40 lbs    HPI: Eats very small portions. 1-2 oz of solid proteins.     DIET INTAKE: Regular food    Diet History:     Fluid intake: 64 oz/day  Breakfast: 1-2 oz of protein  Lunch: protein shake  Dinner: 1-2 oz of protein  Snacks: fruits and veggies    DAILY SUPPLEMENTS:  Calcium: included in multi-vitamin  Multivitamin & Minerals: 1 per day - bariatric fusion  Iron Supplement: included in multi-vitamin  Vitamin B12: included in multi-vitamin  Vitamin D3: included in multi-vitamin  Other: none  PPI:omeprazole    EXERCISE: walking    Symptoms:      Nausea/vomiting: denies   Food intolerance: ground beef   Constipation: denies   Diarrhea: occasional   Experiencing dumping: denies   Abdominal pain: denies   Reflux: denies Are you on medications: omeprazole      Current Outpatient Medications   Medication Sig Dispense Refill    acetaminophen (Tylenol) 325 mg tablet Take 2 tablets (650 mg) by mouth every 6 hours.      ALPRAZolam (Xanax) 0.5 mg tablet take 1 to 2 tablets by mouth once daily if needed for anxiety      cholecalciferol (Vitamin D3) 50 MCG (2000 UT) tablet Take 1 tablet (2,000 Units) by mouth once daily.      FLUoxetine (PROzac) 20 mg capsule Take 1 capsule (20 mg) by mouth once daily.      multivitamin-min-iron-FA-vit K 45 mg iron- 800 mcg-120 mcg capsule Take 1  "tablet by mouth 2 times a day.      omeprazole (PriLOSEC) 40 mg DR capsule Take 1 capsule (40 mg) by mouth once daily in the morning. Take before meals. Do not crush or chew. Open capsule, sprinkle beads on SF jello, pudding or applesauce. 30 capsule 5    ondansetron ODT (Zofran-ODT) 4 mg disintegrating tablet Take 1 tablet (4 mg) by mouth every 6 hours if needed for nausea or vomiting. 15 tablet 1    simethicone (Mylicon) 80 mg chewable tablet Chew 1 tablet (80 mg) every 4 hours if needed for flatulence (gas pain).      UNABLE TO FIND Take 1 tablet by mouth once daily. Med Name: Baritastic Calcium Supplement       No current facility-administered medications for this visit.       Comorbidities:  No problem-specific Assessment & Plan notes found for this encounter.        REVIEW OF SYSTEMS:  CONSTITUTIONAL: Patient denies fevers, chills, sweats and weight changes.  EYES: Patient denies any visual symptoms.  EARS, NOSE, AND THROAT: No difficulties with hearing. No symptoms of rhinitis or sore throat.  CARDIOVASCULAR: Patient denies chest pains, palpitations, orthopnea and paroxysmal nocturnal dyspnea.  RESPIRATORY: No dyspnea on exertion, no wheezing or cough.  GI: No nausea, vomiting, diarrhea, constipation, abdominal pain, hematochezia or melena.  : No urinary hesitancy or dribbling. No nocturia or urinary frequency. No abnormal urethral discharge.  MUSCULOSKELETAL: No myalgias or arthralgias.  NEUROLOGIC: No chronic headaches, no seizures. Patient denies numbness, tingling or weakness.  PSYCHIATRIC: Patient denies problems with mood disturbance. No problems with anxiety.  ENDOCRINE: No excessive urination or excessive thirst.  DERMATOLOGIC: Patient denies any rashes or skin changes.    PHYSICAL EXAM:  /73   Pulse 72   Resp 18   Ht 1.549 m (5' 1\")   Wt 103 kg (227 lb 8 oz)   LMP 07/29/2022   SpO2 98%   BMI 42.99 kg/m²   GENERAL: Obese. No apparent distress. Pt is alert and oriented x3.  HEENT: Head " is normocephalic and atraumatic. Extraocular muscles are intact. Pupils are equal, round, and reactive to light and accommodation. Nares appeared normal. Mouth is well hydrated and without lesions. Mucous membranes are moist. Posterior pharynx clear of any exudate or lesions.  NECK: Supple. No carotid bruits. No lymphadenopathy or thyromegaly.  LUNGS: Clear to auscultation.  HEART: Regular rate and rhythm without murmur.  ABDOMEN: Soft, nontender, and nondistended. Positive bowel sounds. No hepatosplenomegaly was noted.  EXTREMITIES: Without any cyanosis, clubbing, rash, lesions or edema.  NEUROLOGIC: Cranial nerves II through XII are grossly intact.  PSYCHIATRIC: Flat affect, but denies suicidal or homicidal ideations.  SKIN: No ulceration or induration present.      A/P: Normal post-OP course    No sign of infection, Doing well, Excellent Pain control, and tolerating diet    Energy: Energy good    Weight loss: Steady    Recommendations: counseled on exercise, Fluid intake 60 oz/day, Protein 60 g/day, Plan your meals, Continue taking vitamins as directed., and 3 month labs    Follow up: 6 weeks    Start weaning off omeprazole at 3 mo appt if no reflux symptoms.     Nacho Salmon MD  Bariatric and Minimally Invasive General Surgery

## 2024-07-26 ENCOUNTER — APPOINTMENT (OUTPATIENT)
Dept: SURGERY | Facility: CLINIC | Age: 29
End: 2024-07-26
Payer: COMMERCIAL

## 2024-07-30 ENCOUNTER — HOSPITAL ENCOUNTER (EMERGENCY)
Facility: HOSPITAL | Age: 29
Discharge: HOME | End: 2024-07-31
Attending: STUDENT IN AN ORGANIZED HEALTH CARE EDUCATION/TRAINING PROGRAM
Payer: COMMERCIAL

## 2024-07-30 DIAGNOSIS — R11.0 NAUSEA: ICD-10-CM

## 2024-07-30 DIAGNOSIS — R10.31 RIGHT LOWER QUADRANT ABDOMINAL PAIN: Primary | ICD-10-CM

## 2024-07-30 LAB
ALBUMIN SERPL BCP-MCNC: 4.2 G/DL (ref 3.4–5)
ALP SERPL-CCNC: 71 U/L (ref 33–110)
ALT SERPL W P-5'-P-CCNC: 34 U/L (ref 7–45)
ANION GAP SERPL CALC-SCNC: 15 MMOL/L (ref 10–20)
APPEARANCE UR: ABNORMAL
AST SERPL W P-5'-P-CCNC: 26 U/L (ref 9–39)
BASOPHILS # BLD AUTO: 0.04 X10*3/UL (ref 0–0.1)
BASOPHILS NFR BLD AUTO: 0.5 %
BILIRUB SERPL-MCNC: 0.6 MG/DL (ref 0–1.2)
BILIRUB UR STRIP.AUTO-MCNC: ABNORMAL MG/DL
BUN SERPL-MCNC: 9 MG/DL (ref 6–23)
CALCIUM SERPL-MCNC: 9.3 MG/DL (ref 8.6–10.3)
CHLORIDE SERPL-SCNC: 105 MMOL/L (ref 98–107)
CO2 SERPL-SCNC: 23 MMOL/L (ref 21–32)
COLOR UR: YELLOW
CREAT SERPL-MCNC: 0.69 MG/DL (ref 0.5–1.05)
EGFRCR SERPLBLD CKD-EPI 2021: >90 ML/MIN/1.73M*2
EOSINOPHIL # BLD AUTO: 0.23 X10*3/UL (ref 0–0.7)
EOSINOPHIL NFR BLD AUTO: 3 %
ERYTHROCYTE [DISTWIDTH] IN BLOOD BY AUTOMATED COUNT: 14.5 % (ref 11.5–14.5)
GLUCOSE SERPL-MCNC: 92 MG/DL (ref 74–99)
GLUCOSE UR STRIP.AUTO-MCNC: ABNORMAL MG/DL
HCG UR QL IA.RAPID: NEGATIVE
HCT VFR BLD AUTO: 36.5 % (ref 36–46)
HGB BLD-MCNC: 11.8 G/DL (ref 12–16)
IMM GRANULOCYTES # BLD AUTO: 0.02 X10*3/UL (ref 0–0.7)
IMM GRANULOCYTES NFR BLD AUTO: 0.3 % (ref 0–0.9)
KETONES UR STRIP.AUTO-MCNC: ABNORMAL MG/DL
LACTATE SERPL-SCNC: 1 MMOL/L (ref 0.4–2)
LEUKOCYTE ESTERASE UR QL STRIP.AUTO: NEGATIVE
LIPASE SERPL-CCNC: 22 U/L (ref 9–82)
LYMPHOCYTES # BLD AUTO: 2.07 X10*3/UL (ref 1.2–4.8)
LYMPHOCYTES NFR BLD AUTO: 27 %
MCH RBC QN AUTO: 28.1 PG (ref 26–34)
MCHC RBC AUTO-ENTMCNC: 32.3 G/DL (ref 32–36)
MCV RBC AUTO: 87 FL (ref 80–100)
MONOCYTES # BLD AUTO: 0.47 X10*3/UL (ref 0.1–1)
MONOCYTES NFR BLD AUTO: 6.1 %
MUCOUS THREADS #/AREA URNS AUTO: NORMAL /LPF
NEUTROPHILS # BLD AUTO: 4.83 X10*3/UL (ref 1.2–7.7)
NEUTROPHILS NFR BLD AUTO: 63.1 %
NITRITE UR QL STRIP.AUTO: NEGATIVE
NRBC BLD-RTO: 0 /100 WBCS (ref 0–0)
PH UR STRIP.AUTO: 5.5 [PH]
PLATELET # BLD AUTO: 322 X10*3/UL (ref 150–450)
POTASSIUM SERPL-SCNC: 3.9 MMOL/L (ref 3.5–5.3)
PROT SERPL-MCNC: 7.4 G/DL (ref 6.4–8.2)
PROT UR STRIP.AUTO-MCNC: ABNORMAL MG/DL
RBC # BLD AUTO: 4.2 X10*6/UL (ref 4–5.2)
RBC # UR STRIP.AUTO: NEGATIVE /UL
RBC #/AREA URNS AUTO: NORMAL /HPF
SODIUM SERPL-SCNC: 139 MMOL/L (ref 136–145)
SP GR UR STRIP.AUTO: 1.04
SQUAMOUS #/AREA URNS AUTO: NORMAL /HPF
UROBILINOGEN UR STRIP.AUTO-MCNC: ABNORMAL MG/DL
WBC # BLD AUTO: 7.7 X10*3/UL (ref 4.4–11.3)
WBC #/AREA URNS AUTO: NORMAL /HPF

## 2024-07-30 PROCEDURE — 36415 COLL VENOUS BLD VENIPUNCTURE: CPT | Performed by: NURSE PRACTITIONER

## 2024-07-30 PROCEDURE — 2500000004 HC RX 250 GENERAL PHARMACY W/ HCPCS (ALT 636 FOR OP/ED): Performed by: NURSE PRACTITIONER

## 2024-07-30 PROCEDURE — 85025 COMPLETE CBC W/AUTO DIFF WBC: CPT | Performed by: NURSE PRACTITIONER

## 2024-07-30 PROCEDURE — 96374 THER/PROPH/DIAG INJ IV PUSH: CPT

## 2024-07-30 PROCEDURE — 96361 HYDRATE IV INFUSION ADD-ON: CPT

## 2024-07-30 PROCEDURE — 96375 TX/PRO/DX INJ NEW DRUG ADDON: CPT

## 2024-07-30 PROCEDURE — 81025 URINE PREGNANCY TEST: CPT | Performed by: NURSE PRACTITIONER

## 2024-07-30 PROCEDURE — 83605 ASSAY OF LACTIC ACID: CPT | Performed by: NURSE PRACTITIONER

## 2024-07-30 PROCEDURE — 81001 URINALYSIS AUTO W/SCOPE: CPT | Performed by: NURSE PRACTITIONER

## 2024-07-30 PROCEDURE — 80053 COMPREHEN METABOLIC PANEL: CPT | Performed by: NURSE PRACTITIONER

## 2024-07-30 PROCEDURE — 83690 ASSAY OF LIPASE: CPT | Performed by: NURSE PRACTITIONER

## 2024-07-30 PROCEDURE — 99284 EMERGENCY DEPT VISIT MOD MDM: CPT | Mod: 25

## 2024-07-30 RX ORDER — MORPHINE SULFATE 4 MG/ML
4 INJECTION INTRAVENOUS ONCE
Status: COMPLETED | OUTPATIENT
Start: 2024-07-30 | End: 2024-07-30

## 2024-07-30 RX ORDER — ONDANSETRON HYDROCHLORIDE 2 MG/ML
4 INJECTION, SOLUTION INTRAVENOUS ONCE
Status: COMPLETED | OUTPATIENT
Start: 2024-07-30 | End: 2024-07-30

## 2024-07-30 ASSESSMENT — PAIN SCALES - GENERAL: PAINLEVEL_OUTOF10: 4

## 2024-07-30 ASSESSMENT — LIFESTYLE VARIABLES
EVER FELT BAD OR GUILTY ABOUT YOUR DRINKING: NO
EVER HAD A DRINK FIRST THING IN THE MORNING TO STEADY YOUR NERVES TO GET RID OF A HANGOVER: NO
HAVE YOU EVER FELT YOU SHOULD CUT DOWN ON YOUR DRINKING: NO
TOTAL SCORE: 0
HAVE PEOPLE ANNOYED YOU BY CRITICIZING YOUR DRINKING: NO

## 2024-07-30 ASSESSMENT — COLUMBIA-SUICIDE SEVERITY RATING SCALE - C-SSRS
2. HAVE YOU ACTUALLY HAD ANY THOUGHTS OF KILLING YOURSELF?: NO
6. HAVE YOU EVER DONE ANYTHING, STARTED TO DO ANYTHING, OR PREPARED TO DO ANYTHING TO END YOUR LIFE?: NO
1. IN THE PAST MONTH, HAVE YOU WISHED YOU WERE DEAD OR WISHED YOU COULD GO TO SLEEP AND NOT WAKE UP?: NO

## 2024-07-30 ASSESSMENT — PAIN - FUNCTIONAL ASSESSMENT: PAIN_FUNCTIONAL_ASSESSMENT: 0-10

## 2024-07-30 ASSESSMENT — PAIN DESCRIPTION - LOCATION: LOCATION: ABDOMEN

## 2024-07-30 NOTE — Clinical Note
Erick Becerril was seen and treated in our emergency department on 7/30/2024.  She may return to work on 08/01/2024.       If you have any questions or concerns, please don't hesitate to call.      Binta Whatley, APRN-CNP

## 2024-07-31 ENCOUNTER — APPOINTMENT (OUTPATIENT)
Dept: RADIOLOGY | Facility: HOSPITAL | Age: 29
End: 2024-07-31
Payer: COMMERCIAL

## 2024-07-31 VITALS
SYSTOLIC BLOOD PRESSURE: 112 MMHG | WEIGHT: 224 LBS | BODY MASS INDEX: 42.29 KG/M2 | HEIGHT: 61 IN | DIASTOLIC BLOOD PRESSURE: 70 MMHG | HEART RATE: 84 BPM | TEMPERATURE: 98.2 F | OXYGEN SATURATION: 100 % | RESPIRATION RATE: 16 BRPM

## 2024-07-31 PROCEDURE — A9698 NON-RAD CONTRAST MATERIALNOC: HCPCS | Performed by: STUDENT IN AN ORGANIZED HEALTH CARE EDUCATION/TRAINING PROGRAM

## 2024-07-31 PROCEDURE — 2550000001 HC RX 255 CONTRASTS: Performed by: STUDENT IN AN ORGANIZED HEALTH CARE EDUCATION/TRAINING PROGRAM

## 2024-07-31 PROCEDURE — 74177 CT ABD & PELVIS W/CONTRAST: CPT | Performed by: RADIOLOGY

## 2024-07-31 PROCEDURE — 74177 CT ABD & PELVIS W/CONTRAST: CPT

## 2024-07-31 RX ORDER — ONDANSETRON 4 MG/1
4 TABLET, ORALLY DISINTEGRATING ORAL EVERY 8 HOURS PRN
Qty: 20 TABLET | Refills: 0 | Status: SHIPPED | OUTPATIENT
Start: 2024-07-31 | End: 2024-08-07

## 2024-07-31 RX ORDER — HYDROCODONE BITARTRATE AND ACETAMINOPHEN 5; 325 MG/1; MG/1
1 TABLET ORAL EVERY 6 HOURS PRN
Qty: 8 TABLET | Refills: 0 | Status: SHIPPED | OUTPATIENT
Start: 2024-07-31 | End: 2024-08-03

## 2024-07-31 ASSESSMENT — PAIN - FUNCTIONAL ASSESSMENT: PAIN_FUNCTIONAL_ASSESSMENT: 0-10

## 2024-07-31 ASSESSMENT — PAIN SCALES - GENERAL
PAINLEVEL_OUTOF10: 4
PAINLEVEL_OUTOF10: 2

## 2024-07-31 NOTE — ED TRIAGE NOTES
Patient presents to the ED with c/o abdominal pain that started yesterday.  She had a gastric sleeve procedure two months ago and reached out to her surgeon who instructed her to come into the ED.

## 2024-07-31 NOTE — ED PROVIDER NOTES
[facility]  ED  Encounter Note  Admit Date/RoomTime: 2024  9:54 PM  ED Room: [facility]  NAME: Erick Becerril  : 1995  MRN: [id number]     Chief Complaint:  Abdominal Pain    HISTORY OF PRESENT ILLNESS        Erick Becerril is a 29 y.o. female who presents to the ED for evaluation of rlq that started about 24 hours ago. Pt is sick to stomach and felt some rumbling of her stomach. Had gastric sleeve 2 months ago and was okay She called her surgeon this am  who recommended that she come to the hospital. Denied fever chills or urinary symptoms. Had gallbladder and hysterectomy in the past.     ROS   Pertinent positives and negatives are stated within HPI, all other systems reviewed and are negative.    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    Surgical History:  has a past surgical history that includes [place] tooth extraction; Cholecystectomy; Tubal ligation; Hysterectomy; Ankle arthroscopy w/ open repair (Right, 2023); Sleeve Gastroplasty (2024); and Hiatal hernia repair (2024).    Social History:  reports that she has never smoked. She has never been exposed to tobacco smoke. She has never used smokeless tobacco. She reports that she does not currently use alcohol. She reports that she does not use drugs.    Family History: family history includes Diabetes in her paternal grandfather; No Known Problems in her father; Skin cancer in her mother.     Allergies: Patient has no known allergies.    PHYSICAL EXAM   Oxygen Saturation Interpretation: Normal.      Physical Exam  Constitutional/General: Alert and oriented x3, well appearing, non toxic  HEENT:  NC/NT. PERRLA.  Airway patent.  Neck: Supple, full ROM. No midline vertebral tenderness or crepitus.   Respiratory: Lung sounds clear to auscultation bilaterally. No wheezes, rhonchi or stridor. Not in respiratory distress.  CV:  Regular rate. Regular rhythm. No murmurs or rubs. 2+ distal pulses.  GI:  Abdomen soft, tender, non-distended. +BS. No rebound, guarding, or rigidity. No pulsatile masses.  Musculoskeletal: Moves all extremities x 4. Warm and well perfused. Capillary refill <3 seconds  Integument: Skin warm and dry. No rashes.   Neurologic: Alert and oriented with no focal deficits, symmetric strength 5/5 in the upper and lower extremities bilaterally.  Psychiatric: Normal affect.    Lab / Imaging Results   (All laboratory and radiology results have been personally reviewed by myself)  Labs:  Results for orders placed or performed during the hospital encounter of 07/30/24   CBC and Auto Differential   Result Value Ref Range    WBC 7.7 4.4 - 11.3 x10*3/uL    nRBC 0.0 0.0 - 0.0 /100 WBCs    RBC 4.20 4.00 - 5.20 x10*6/uL    Hemoglobin 11.8 (L) 12.0 - 16.0 g/dL    Hematocrit 36.5 36.0 - 46.0 %    MCV 87 80 - 100 fL    MCH 28.1 26.0 - 34.0 pg    MCHC 32.3 32.0 - 36.0 g/dL    RDW 14.5 11.5 - 14.5 %    Platelets 322 150 - 450 x10*3/uL    Neutrophils % 63.1 40.0 - 80.0 %    Immature Granulocytes %, Automated 0.3 0.0 - 0.9 %    Lymphocytes % 27.0 13.0 - 44.0 %    Monocytes % 6.1 2.0 - 10.0 %    Eosinophils % 3.0 0.0 - 6.0 %    Basophils % 0.5 0.0 - 2.0 %    Neutrophils Absolute 4.83 1.20 - 7.70 x10*3/uL    Immature Granulocytes Absolute, Automated 0.02 0.00 - 0.70 x10*3/uL    Lymphocytes Absolute 2.07 1.20 - 4.80 x10*3/uL    Monocytes Absolute 0.47 0.10 - 1.00 x10*3/uL    Eosinophils Absolute 0.23 0.00 - 0.70 x10*3/uL    Basophils Absolute 0.04 0.00 - 0.10 x10*3/uL   Comprehensive metabolic panel   Result Value Ref  Range    Glucose 92 74 - 99 mg/dL    Sodium 139 136 - 145 mmol/L    Potassium 3.9 3.5 - 5.3 mmol/L    Chloride 105 98 - 107 mmol/L    Bicarbonate 23 21 - 32 mmol/L    Anion Gap 15 10 - 20 mmol/L    Urea Nitrogen 9 6 - 23 mg/dL    Creatinine 0.69 0.50 - 1.05 mg/dL    eGFR >90 >60 mL/min/1.73m*2    Calcium 9.3 8.6 - 10.3 mg/dL    Albumin 4.2 3.4 - 5.0 g/dL    Alkaline Phosphatase 71 33 - 110 U/L    Total Protein 7.4 6.4 - 8.2 g/dL    AST 26 9 - 39 U/L    Bilirubin, Total 0.6 0.0 - 1.2 mg/dL    ALT 34 7 - 45 U/L   Lipase   Result Value Ref Range    Lipase 22 9 - 82 U/L   Lactate   Result Value Ref Range    Lactate 1.0 0.4 - 2.0 mmol/L   Urinalysis with Reflex Microscopic   Result Value Ref Range    Color, Urine Yellow Light-Yellow, Yellow, Dark-Yellow    Appearance, Urine Turbid (N) Clear    Specific Gravity, Urine 1.038 (N) 1.005 - 1.035    pH, Urine 5.5 5.0, 5.5, 6.0, 6.5, 7.0, 7.5, 8.0    Protein, Urine 70 (1+) (A) NEGATIVE, 10 (TRACE), 20 (TRACE) mg/dL    Glucose, Urine 30 (TRACE) (A) Normal mg/dL    Blood, Urine NEGATIVE NEGATIVE    Ketones, Urine 10 (1+) (A) NEGATIVE mg/dL    Bilirubin, Urine 0.5 (1+) (A) NEGATIVE    Urobilinogen, Urine 3 (1+) (A) Normal mg/dL    Nitrite, Urine NEGATIVE NEGATIVE    Leukocyte Esterase, Urine NEGATIVE NEGATIVE   hCG, Urine, Qualitative   Result Value Ref Range    HCG, Urine NEGATIVE NEGATIVE   Microscopic Only, Urine   Result Value Ref Range    WBC, Urine 1-5 1-5, NONE /HPF    RBC, Urine 3-5 NONE, 1-2, 3-5 /HPF    Squamous Epithelial Cells, Urine 10-25 (FEW) Reference range not established. /HPF    Mucus, Urine 4+ Reference range not established. /LPF     Imaging:  All Radiology results interpreted by Radiologist unless otherwise noted.  CT abdomen pelvis w IV contrast   Final Result   Postsurgical change of gastric surgery. No evidence of bowel   obstruction or acute appendicitis. No significant free abdominal or   pelvic fluid.        Postsurgical change of prior cholecystectomy.         MACRO:   None        Signed by: Watson Bruce 7/31/2024 1:47 AM   Dictation workstation:   [id number]          ED Course / Medical Decision Making     Medications   sodium chloride 0.9 % bolus 1,000 mL (0 mL intravenous Stopped 7/30/24 2312)   morphine injection 4 mg (4 mg intravenous Given 7/30/24 2243)   ondansetron (Zofran) injection 4 mg (4 mg intravenous Given 7/30/24 2243)   sodium chloride 0.9 % bolus 1,000 mL (0 mL intravenous Stopped 7/31/24 0036)   iohexol (OMNIPaque) 350 mg iodine/mL solution 75 mL (75 mL intravenous Given 7/31/24 0048)   iohexol (OMNIPaque) 12 mg iodine/mL oral contrast 500 mL (500 mL oral Given 7/31/24 0049)     ED Course as of 07/31/24 1925 Wed Jul 31, 2024 0047 This is a 29-year-old female who follows with Dr. Salmon coming in with chief complaint of abdominal pain.  Pain is on the right lower quadrant.  Pending at this time awaiting imaging. [WL]   0134 Pain is just right of her umbilicus.  Nontender when I examined her but this after pain medication was already given.  States she has been having some nausea but treated well here with the Zofran.  States she has had some loose stool symptoms have been ongoing for about 24 hours. [WL]   0150 Ct Shows Postsurgical change of gastric surgery. No evidence of bowel  obstruction or acute appendicitis. No significant free abdominal or  pelvic fluid.      Postsurgical change of prior cholecystectomy.   [WL]   0150  we will give her Norco and Zofran to go home with and she will follow-up with her surgeon Dr. Salmon. [WL]      ED Course User Index  [WL] Ezequiel Roberts DO         Diagnoses as of 07/31/24 1925   Right lower quadrant abdominal pain   Nausea     Re-examination:    Patient’s condition stable.    Consult(s):   None/possibly Dr Salmon pending final ct scan results        MDM:       Erick Becerril is a 29 y.o. female who presents to the ED for evaluation of rlq that started about 24 hours ago. Pt is sick to stomach and felt  some rumbling of her stomach. Had gastric sleeve 2 months ago and was okay She called her surgeon this am  who recommended that she come to the hospital. Denied fever chills or urinary symptoms. Had gallbladder and hysterectomy in the past.     ED course stable  Labs cbc cmp wnl  Patient felt better after iv fluids  Vss remained stable; sign out to attending as ct scan abd pelvis pending.   Ddx: abdominal pain     Plan of Care/Counseling:  I reviewed today's visit with the patient  in addition to providing specific details for the plan of care and counseling regarding the diagnosis and prognosis.  Questions are answered at this time and are agreeable with the plan.    ASSESSMENT     1. Right lower quadrant abdominal pain    2. Nausea      PLAN   Anticipate home; care signed out to attending    New Medications     New Medications Ordered This Visit   Medications    sodium chloride 0.9 % bolus 1,000 mL    morphine injection 4 mg    ondansetron (Zofran) injection 4 mg    sodium chloride 0.9 % bolus 1,000 mL    iohexol (OMNIPaque) 350 mg iodine/mL solution 75 mL    iohexol (OMNIPaque) 12 mg iodine/mL oral contrast 500 mL    HYDROcodone-acetaminophen (Norco) 5-325 mg tablet     Sig: Take 1 tablet by mouth every 6 hours if needed for severe pain (7 - 10) for up to 3 days.     Dispense:  8 tablet     Refill:  0    ondansetron ODT (Zofran-ODT) 4 mg disintegrating tablet     Sig: Take 1 tablet (4 mg) by mouth every 8 hours if needed for nausea or vomiting for up to 7 days.     Dispense:  20 tablet     Refill:  0     Electronically signed by UMA Waddell     **This report was transcribed using voice recognition software. Every effort was made to ensure accuracy; however, inadvertent computerized transcription errors may be present.  END OF ED PROVIDER NOTE     UMA Waddell  07/31/24 2526

## 2024-08-07 ENCOUNTER — TELEPHONE (OUTPATIENT)
Dept: SURGERY | Facility: CLINIC | Age: 29
End: 2024-08-07
Payer: COMMERCIAL

## 2024-08-07 NOTE — TELEPHONE ENCOUNTER
Thank you for speaking with me earlier today & confirming your scheduled visit with Dr. Salmon on 8/8/24 10AM [facility].  We look forward to seeing you!  Adri Kaufman, [facility] Nurse.

## 2024-08-08 ENCOUNTER — APPOINTMENT (OUTPATIENT)
Dept: SURGERY | Facility: CLINIC | Age: 29
End: 2024-08-08
Payer: COMMERCIAL

## 2024-08-08 DIAGNOSIS — Z98.84 S/P LAPAROSCOPIC SLEEVE GASTRECTOMY: ICD-10-CM

## 2024-08-08 DIAGNOSIS — R10.11 RIGHT UPPER QUADRANT ABDOMINAL PAIN: Primary | ICD-10-CM

## 2024-08-08 DIAGNOSIS — K21.00 HIATAL HERNIA WITH GERD AND ESOPHAGITIS: ICD-10-CM

## 2024-08-08 DIAGNOSIS — K44.9 HIATAL HERNIA WITH GERD AND ESOPHAGITIS: ICD-10-CM

## 2024-08-08 PROCEDURE — 99024 POSTOP FOLLOW-UP VISIT: CPT | Performed by: SURGERY

## 2024-08-08 RX ORDER — SUCRALFATE 1 G/1
1 TABLET ORAL
Qty: 120 TABLET | Refills: 2 | Status: SHIPPED | OUTPATIENT
Start: 2024-08-08

## 2024-08-08 NOTE — PROGRESS NOTES
BARIATRIC SURGERY CLINIC  FOLLOW UP NOTE      Name: Erick Becerril  MRN: [id number]      Index Surgery  Date of Surgery: 6/4/24   Surgeon: Nacho Salmon MD  Surgical Procedure: Laparoscopic sleeve gastrectomy  52277 and Hiatal hernia repair 08938  Initial weight: 261 lbs  Pre-surgical weight: 267 lbs      Other Bariatric Surgeries  none    Visit: 2    months  Today's Visit:   Current weight: 221.6 lbs  Wt Readings from Last 1 Encounters:   07/30/24 102 kg (224 lb)    There is no height or weight on file to calculate BMI.   Last Visit:    Wt Readings from Last 3 Encounters:   07/30/24 102 kg (224 lb)   07/25/24 103 kg (227 lb 8 oz)   07/18/24 105 kg (232 lb 6.4 oz)       Total weight loss: 46 lbs    HPI: Started having stomach pain and diarrhea last week. Went to ER and had normal labs and a normal CT (no leak). She continues to have the same pain, has sharp pains that can wake her up. Pain is on the right side and when you push on her abdomen in hurts. Pain gets worse if she eats but is constant no matter what. Stools are still loose but only once a day. It doesn't follow eating or drinking. No N/V. She does not have a gallbladder. She is taking the prilosec once a day. She is staying hydrated. No overt dysphagia - but occasionally feels that food sits in her chest with a heaviness but no pain - it does pass eventually.     DIET INTAKE: Regular food        Current Outpatient Medications   Medication Sig Dispense Refill    acetaminophen (Tylenol) 325 mg tablet Take 2 tablets (650 mg) by mouth every 6 hours.      ALPRAZolam (Xanax) 0.5 mg tablet take 1 to 2 tablets by mouth once daily if needed for anxiety      cholecalciferol (Vitamin D3) 50 MCG (2000 UT) tablet Take 1 tablet (2,000 Units) by mouth once daily.      FLUoxetine (PROzac) 20 mg capsule Take 1 capsule (20 mg) by mouth once daily.      multivitamin-min-iron-FA-vit K 45 mg iron- 800 mcg-120 mcg capsule Take 1 tablet by mouth 2 times a day.      omeprazole  (PriLOSEC) 40 mg DR capsule Take 1 capsule (40 mg) by mouth once daily in the morning. Take before meals. Do not crush or chew. Open capsule, sprinkle beads on SF jello, pudding or applesauce. 30 capsule 5    ondansetron ODT (Zofran-ODT) 4 mg disintegrating tablet Take 1 tablet (4 mg) by mouth every 6 hours if needed for nausea or vomiting. 15 tablet 1    simethicone (Mylicon) 80 mg chewable tablet Chew 1 tablet (80 mg) every 4 hours if needed for flatulence (gas pain).      UNABLE TO FIND Take 1 tablet by mouth once daily. Med Name: Baritastic Calcium Supplement       No current facility-administered medications for this visit.       Comorbidities:  No problem-specific Assessment & Plan notes found for this encounter.        REVIEW OF SYSTEMS:  CONSTITUTIONAL: Patient denies fevers, chills, sweats and weight changes.  EYES: Patient denies any visual symptoms.  EARS, NOSE, AND THROAT: No difficulties with hearing. No symptoms of rhinitis or sore throat.  CARDIOVASCULAR: Patient denies chest pains, palpitations, orthopnea and paroxysmal nocturnal dyspnea.  RESPIRATORY: No dyspnea on exertion, no wheezing or cough.  GI: No nausea, vomiting, diarrhea, constipation, abdominal pain, hematochezia or melena.  : No urinary hesitancy or dribbling. No nocturia or urinary frequency. No abnormal urethral discharge.  MUSCULOSKELETAL: No myalgias or arthralgias.  NEUROLOGIC: No chronic headaches, no seizures. Patient denies numbness, tingling or weakness.  PSYCHIATRIC: Patient denies problems with mood disturbance. No problems with anxiety.  ENDOCRINE: No excessive urination or excessive thirst.  DERMATOLOGIC: Patient denies any rashes or skin changes.    PHYSICAL EXAM:  No vital signs and a limited physical exam was conducted during this virtual visit.         A/P: Acute onset RUQ abdominal pain and diarrhea 2 months s/p sleeve gastrectomy and HHR. Ddx includes gastritis, gastric/duodenal ulcer, HH recurrence.    Increase  prilosec to 2 times a day and open capsule.  Start sucralfate 4 times a day.  EGD next week.     Nacho Salmon MD  Bariatric and Minimally Invasive General Surgery

## 2024-08-08 NOTE — H&P (VIEW-ONLY)
BARIATRIC SURGERY CLINIC  FOLLOW UP NOTE      Name: Erick Becerril  MRN: [id number]      Index Surgery  Date of Surgery: 6/4/24   Surgeon: Nacho Salmon MD  Surgical Procedure: Laparoscopic sleeve gastrectomy  23041 and Hiatal hernia repair 75852  Initial weight: 261 lbs  Pre-surgical weight: 267 lbs      Other Bariatric Surgeries  none    Visit: 2    months  Today's Visit:   Current weight: 221.6 lbs  Wt Readings from Last 1 Encounters:   07/30/24 102 kg (224 lb)    There is no height or weight on file to calculate BMI.   Last Visit:    Wt Readings from Last 3 Encounters:   07/30/24 102 kg (224 lb)   07/25/24 103 kg (227 lb 8 oz)   07/18/24 105 kg (232 lb 6.4 oz)       Total weight loss: 46 lbs    HPI: Started having stomach pain and diarrhea last week. Went to ER and had normal labs and a normal CT (no leak). She continues to have the same pain, has sharp pains that can wake her up. Pain is on the right side and when you push on her abdomen in hurts. Pain gets worse if she eats but is constant no matter what. Stools are still loose but only once a day. It doesn't follow eating or drinking. No N/V. She does not have a gallbladder. She is taking the prilosec once a day. She is staying hydrated. No overt dysphagia - but occasionally feels that food sits in her chest with a heaviness but no pain - it does pass eventually.     DIET INTAKE: Regular food        Current Outpatient Medications   Medication Sig Dispense Refill    acetaminophen (Tylenol) 325 mg tablet Take 2 tablets (650 mg) by mouth every 6 hours.      ALPRAZolam (Xanax) 0.5 mg tablet take 1 to 2 tablets by mouth once daily if needed for anxiety      cholecalciferol (Vitamin D3) 50 MCG (2000 UT) tablet Take 1 tablet (2,000 Units) by mouth once daily.      FLUoxetine (PROzac) 20 mg capsule Take 1 capsule (20 mg) by mouth once daily.      multivitamin-min-iron-FA-vit K 45 mg iron- 800 mcg-120 mcg capsule Take 1 tablet by mouth 2 times a day.      omeprazole  (PriLOSEC) 40 mg DR capsule Take 1 capsule (40 mg) by mouth once daily in the morning. Take before meals. Do not crush or chew. Open capsule, sprinkle beads on SF jello, pudding or applesauce. 30 capsule 5    ondansetron ODT (Zofran-ODT) 4 mg disintegrating tablet Take 1 tablet (4 mg) by mouth every 6 hours if needed for nausea or vomiting. 15 tablet 1    simethicone (Mylicon) 80 mg chewable tablet Chew 1 tablet (80 mg) every 4 hours if needed for flatulence (gas pain).      UNABLE TO FIND Take 1 tablet by mouth once daily. Med Name: Baritastic Calcium Supplement       No current facility-administered medications for this visit.       Comorbidities:  No problem-specific Assessment & Plan notes found for this encounter.        REVIEW OF SYSTEMS:  CONSTITUTIONAL: Patient denies fevers, chills, sweats and weight changes.  EYES: Patient denies any visual symptoms.  EARS, NOSE, AND THROAT: No difficulties with hearing. No symptoms of rhinitis or sore throat.  CARDIOVASCULAR: Patient denies chest pains, palpitations, orthopnea and paroxysmal nocturnal dyspnea.  RESPIRATORY: No dyspnea on exertion, no wheezing or cough.  GI: No nausea, vomiting, diarrhea, constipation, abdominal pain, hematochezia or melena.  : No urinary hesitancy or dribbling. No nocturia or urinary frequency. No abnormal urethral discharge.  MUSCULOSKELETAL: No myalgias or arthralgias.  NEUROLOGIC: No chronic headaches, no seizures. Patient denies numbness, tingling or weakness.  PSYCHIATRIC: Patient denies problems with mood disturbance. No problems with anxiety.  ENDOCRINE: No excessive urination or excessive thirst.  DERMATOLOGIC: Patient denies any rashes or skin changes.    PHYSICAL EXAM:  No vital signs and a limited physical exam was conducted during this virtual visit.         A/P: Acute onset RUQ abdominal pain and diarrhea 2 months s/p sleeve gastrectomy and HHR. Ddx includes gastritis, gastric/duodenal ulcer, HH recurrence.    Increase  prilosec to 2 times a day and open capsule.  Start sucralfate 4 times a day.  EGD next week.     Nacho Salmon MD  Bariatric and Minimally Invasive General Surgery

## 2024-08-13 ENCOUNTER — APPOINTMENT (OUTPATIENT)
Dept: OPERATING ROOM | Facility: HOSPITAL | Age: 29
End: 2024-08-13
Payer: COMMERCIAL

## 2024-08-26 ENCOUNTER — ANESTHESIA EVENT (OUTPATIENT)
Dept: OPERATING ROOM | Facility: HOSPITAL | Age: 29
End: 2024-08-26
Payer: COMMERCIAL

## 2024-08-26 RX ORDER — OXYCODONE HYDROCHLORIDE 5 MG/1
10 TABLET ORAL EVERY 4 HOURS PRN
OUTPATIENT
Start: 2024-08-26

## 2024-08-26 RX ORDER — ACETAMINOPHEN 325 MG/1
650 TABLET ORAL EVERY 4 HOURS PRN
OUTPATIENT
Start: 2024-08-26

## 2024-08-26 RX ORDER — OXYCODONE HYDROCHLORIDE 5 MG/1
5 TABLET ORAL EVERY 4 HOURS PRN
OUTPATIENT
Start: 2024-08-26

## 2024-08-26 RX ORDER — LIDOCAINE HYDROCHLORIDE 10 MG/ML
0.1 INJECTION, SOLUTION EPIDURAL; INFILTRATION; INTRACAUDAL; PERINEURAL ONCE
OUTPATIENT
Start: 2024-08-26 | End: 2024-08-26

## 2024-08-27 ENCOUNTER — ANESTHESIA (OUTPATIENT)
Dept: OPERATING ROOM | Facility: HOSPITAL | Age: 29
End: 2024-08-27
Payer: COMMERCIAL

## 2024-08-27 ENCOUNTER — HOSPITAL ENCOUNTER (OUTPATIENT)
Dept: OPERATING ROOM | Facility: HOSPITAL | Age: 29
Setting detail: OUTPATIENT SURGERY
Discharge: HOME | End: 2024-08-27
Payer: COMMERCIAL

## 2024-08-27 VITALS
RESPIRATION RATE: 16 BRPM | OXYGEN SATURATION: 99 % | TEMPERATURE: 97.9 F | DIASTOLIC BLOOD PRESSURE: 71 MMHG | HEART RATE: 64 BPM | BODY MASS INDEX: 40.33 KG/M2 | SYSTOLIC BLOOD PRESSURE: 129 MMHG | WEIGHT: 213.63 LBS | HEIGHT: 61 IN

## 2024-08-27 DIAGNOSIS — R10.11 RIGHT UPPER QUADRANT ABDOMINAL PAIN: ICD-10-CM

## 2024-08-27 DIAGNOSIS — Z98.84 S/P LAPAROSCOPIC SLEEVE GASTRECTOMY: ICD-10-CM

## 2024-08-27 DIAGNOSIS — K44.9 HIATAL HERNIA WITH GERD AND ESOPHAGITIS: ICD-10-CM

## 2024-08-27 DIAGNOSIS — K21.00 HIATAL HERNIA WITH GERD AND ESOPHAGITIS: ICD-10-CM

## 2024-08-27 PROCEDURE — 3700000001 HC GENERAL ANESTHESIA TIME - INITIAL BASE CHARGE: Performed by: ANESTHESIOLOGY

## 2024-08-27 PROCEDURE — 3600000007 HC OR TIME - EACH INCREMENTAL 1 MINUTE - PROCEDURE LEVEL TWO: Performed by: ANESTHESIOLOGY

## 2024-08-27 PROCEDURE — 2500000004 HC RX 250 GENERAL PHARMACY W/ HCPCS (ALT 636 FOR OP/ED): Performed by: NURSE ANESTHETIST, CERTIFIED REGISTERED

## 2024-08-27 PROCEDURE — 7100000009 HC PHASE TWO TIME - INITIAL BASE CHARGE: Performed by: ANESTHESIOLOGY

## 2024-08-27 PROCEDURE — 3600000002 HC OR TIME - INITIAL BASE CHARGE - PROCEDURE LEVEL TWO: Performed by: ANESTHESIOLOGY

## 2024-08-27 PROCEDURE — 2500000004 HC RX 250 GENERAL PHARMACY W/ HCPCS (ALT 636 FOR OP/ED): Performed by: STUDENT IN AN ORGANIZED HEALTH CARE EDUCATION/TRAINING PROGRAM

## 2024-08-27 PROCEDURE — 3700000002 HC GENERAL ANESTHESIA TIME - EACH INCREMENTAL 1 MINUTE: Performed by: ANESTHESIOLOGY

## 2024-08-27 PROCEDURE — 7100000010 HC PHASE TWO TIME - EACH INCREMENTAL 1 MINUTE: Performed by: ANESTHESIOLOGY

## 2024-08-27 PROCEDURE — 43235 EGD DIAGNOSTIC BRUSH WASH: CPT | Performed by: SURGERY

## 2024-08-27 RX ORDER — FENTANYL CITRATE 50 UG/ML
INJECTION, SOLUTION INTRAMUSCULAR; INTRAVENOUS AS NEEDED
Status: DISCONTINUED | OUTPATIENT
Start: 2024-08-27 | End: 2024-08-27

## 2024-08-27 RX ORDER — PROPOFOL 10 MG/ML
INJECTION, EMULSION INTRAVENOUS AS NEEDED
Status: DISCONTINUED | OUTPATIENT
Start: 2024-08-27 | End: 2024-08-27

## 2024-08-27 RX ORDER — SODIUM CHLORIDE, SODIUM LACTATE, POTASSIUM CHLORIDE, CALCIUM CHLORIDE 600; 310; 30; 20 MG/100ML; MG/100ML; MG/100ML; MG/100ML
100 INJECTION, SOLUTION INTRAVENOUS CONTINUOUS
Status: DISCONTINUED | OUTPATIENT
Start: 2024-08-27 | End: 2024-08-28 | Stop reason: HOSPADM

## 2024-08-27 RX ORDER — MIDAZOLAM HYDROCHLORIDE 1 MG/ML
INJECTION INTRAMUSCULAR; INTRAVENOUS AS NEEDED
Status: DISCONTINUED | OUTPATIENT
Start: 2024-08-27 | End: 2024-08-27

## 2024-08-27 SDOH — HEALTH STABILITY: MENTAL HEALTH: CURRENT SMOKER: 0

## 2024-08-27 ASSESSMENT — PAIN SCALES - GENERAL
PAINLEVEL_OUTOF10: 0 - NO PAIN
PAINLEVEL_OUTOF10: 0 - NO PAIN
PAIN_LEVEL: 2
PAINLEVEL_OUTOF10: 0 - NO PAIN
PAINLEVEL_OUTOF10: 0 - NO PAIN

## 2024-08-27 ASSESSMENT — PAIN - FUNCTIONAL ASSESSMENT
PAIN_FUNCTIONAL_ASSESSMENT: 0-10
PAIN_FUNCTIONAL_ASSESSMENT: 0-10

## 2024-08-27 NOTE — PROGRESS NOTES
"Follow Up Bariatric Nutrition Assessment    Name: Erick Becerril  MRN: [id number]  Date: 09/05/24     Surgery Date: 6/04/24  Surgeon: Viky  Procedure: sleeve gastrectomy    ASSESSMENT:    Current weight in pounds:     Vitals:    09/05/24 1136   Weight: 93.9 kg (207 lb)        Ht:  1.549 m (5' 1\")      BMI:  Body mass index is 39.11 kg/m².  Previous weight in pounds: 232.4  7/18/24  Initial start weight in pounds: 261.0  10/11/23  EBW in pounds: 125.0  Total weight change in pounds: 54.0  %EBW Lost: 43.2    PROGRESS:  Nutrition Interventions for last encounter   Continue to drink your protein shakes to meet your goal of 60-70 g of protein per day. Begin measuring how much protein you can eat on the soft diet so that you know when to start weaning off of your protein shakes.   Continue to drink 64 oz. of zero calorie beverages per day  Continue no drinking 30 min before, during the meal and for 30 minutes after the meal  Continue to exercise  Remember to eat slowly and chew thoroughly  Try one new food at a time to test for any intolerances.   Continue to take all of your vitamins and minerals.   Attend support groups as needed    CHANGES IN TREATMENT:   Patient met goals: yes   24 hour food recall: 64 oz. fluid and 70 grams protein  Breakfast: Greek yogurt with blueberries  Snack: raw vegetables   Lunch: 2 oz. ground chicken breast with TBS, cheese, tomatoes, peppers and light sour cream  Snack: no  Dinner: 2 oz. steak and 1/4 sweet potato  Snack: Greek yogurt  Beverages: water  Alcohol: no    Vitamins: : MVI: Bariatric Fusion with iron 4 times daily; 1260 mg. calcium  in MVI), and B12(in MVI)   Medications:   Current Outpatient Medications:     acetaminophen (Tylenol) 325 mg tablet, Take 2 tablets (650 mg) by mouth every 6 hours., Disp: , Rfl:     ALPRAZolam (Xanax) 0.5 mg tablet, take 1 to 2 tablets by mouth once daily if needed for anxiety, Disp: , Rfl:     cholecalciferol (Vitamin D3) 50 MCG (2000 UT) tablet, Take 1 " tablet (2,000 Units) by mouth once daily., Disp: , Rfl:     FLUoxetine (PROzac) 20 mg capsule, Take 1 capsule (20 mg) by mouth once daily., Disp: , Rfl:     multivitamin-min-iron-FA-vit K 45 mg iron- 800 mcg-120 mcg capsule, Take 1 tablet by mouth 2 times a day., Disp: , Rfl:     omeprazole (PriLOSEC) 40 mg DR capsule, Take 1 capsule (40 mg) by mouth once daily in the morning. Take before meals. Do not crush or chew. Open capsule, sprinkle beads on SF jello, pudding or applesauce., Disp: 30 capsule, Rfl: 5    ondansetron ODT (Zofran-ODT) 4 mg disintegrating tablet, Take 1 tablet (4 mg) by mouth every 6 hours if needed for nausea or vomiting., Disp: 15 tablet, Rfl: 1    simethicone (Mylicon) 80 mg chewable tablet, Chew 1 tablet (80 mg) every 4 hours if needed for flatulence (gas pain)., Disp: , Rfl:     UNABLE TO FIND, Take 1 tablet by mouth once daily. 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 and walking but not consistently.    READINESS TO LEARN:  Motivation to learn:  Good       Understanding of instruction: Good  Anticipated Compliance: Good  Family Support: Unable to assess-family not present     The pt is 3 months postop sleeve gastrectomy.   Patient has lost 54.0 pounds since initial assessment accounting for 43.2% loss excess body weight.  Tolerating regular diet without difficulty.  Protein intake is adequate for post-op individual.  Fluid consumption is adequate.  Patient is supplementing recommended vitamin/minerals.  Pt is feeling well and is not having any difficulties.    Malnutrition Screening  Significant unintentional weight loss? n/a  Eating less than 75% of usual intake for more than 2 weeks? n/a    Nutrition Diagnosis:   Increased protein and nutrition needs related to altered GI function as evidenced by pt. s/p sleeve gastrectomy.  Food- and nutrition-related knowledge deficit related to lack of prior exposure to  surgical weight loss information as evidenced by diet recall.     Nutrition Interventions:   Modify type and amount of food and nutrients within meals and snacks.  Comprehensive Nutrition Education  -Nutrition education materials: none      Recommendations:    Continue to eat 60-70 g of protein per day.  Continue to drink 64 oz. of zero calorie beverages per day.  Continue no drinking 30 min before, during the meal and for 30 minutes after the meal.  Eat slowly, chew thoroughly.  Continue regular exercise and increase intensity and duration.  Continue current vit/min regimen.  Attend support Groups as needed.    Nutrition Monitoring and Evaluation:   1-2 pounds weight loss per week  Criteria: weight check, food recall  Need for Follow-up: 6 months post-op

## 2024-08-27 NOTE — ANESTHESIA POSTPROCEDURE EVALUATION
Patient: Erick Becerril    Procedure Summary       Date: 08/27/24 Room / Location: [facility] OR    Anesthesia Start: 1259 Anesthesia Stop: 1319    Procedure: EGD Diagnosis:       Right upper quadrant abdominal pain      S/P laparoscopic sleeve gastrectomy      Hiatal hernia with GERD and esophagitis    Scheduled Providers: Nacho Salmon MD; Lauro Shearer MD Responsible Provider: Lauro Shearer MD    Anesthesia Type: MAC ASA Status: 2            Anesthesia Type: MAC    Vitals Value Taken Time   BP  08/27/24 1429   Temp  08/27/24 1429   Pulse  08/27/24 1429   Resp  08/27/24 1429   SpO2  08/27/24 1429       Anesthesia Post Evaluation    Patient location during evaluation: PACU  Patient participation: complete - patient participated  Level of consciousness: awake  Pain score: 2  Pain management: adequate  Multimodal analgesia pain management approach  Airway patency: patent  Two or more strategies used to mitigate risk of obstructive sleep apnea  Cardiovascular status: acceptable  Respiratory status: acceptable  Hydration status: acceptable  Postoperative Nausea and Vomiting: none    There were no known notable events for this encounter.

## 2024-08-27 NOTE — ANESTHESIA PREPROCEDURE EVALUATION
Patient: Erick Becerril    Procedure Information       Date/Time: 08/27/24 1300    Scheduled providers: Nacho Salmon MD; Lauro Shearer MD    Procedure: EGD    Location: [facility] OR            Relevant Problems   GI   (+) Hiatal hernia with GERD and esophagitis      Endocrine   (+) Class 3 severe obesity due to excess calories with body mass index (BMI) of 45.0 to 49.9 in adult (Multi)   (+) Morbid obesity due to excess calories (Multi)      Hematology   (+) Anemia      HEENT   (+) Seasonal allergies      ID   (+) Furuncle of umbilicus   (+) Hand, foot and mouth disease   (+) Wound infection       Clinical information reviewed:    Allergies  Meds               NPO Detail:  NPO/Void Status  Date of Last Liquid: 08/26/24  Time of Last Liquid: 1900  Date of Last Solid: 08/26/24  Time of Last Solid: 1900         Physical Exam    Airway  Mallampati: II     Cardiovascular - normal exam     Dental    Pulmonary - normal exam     Abdominal - normal exam             Anesthesia Plan    History of general anesthesia?: yes  History of complications of general anesthesia?: no    ASA 2     MAC     The patient is not a current smoker.    intravenous induction   Anesthetic plan and risks discussed with patient.  Use of blood products discussed with patient who.

## 2024-09-05 ENCOUNTER — APPOINTMENT (OUTPATIENT)
Dept: SURGERY | Facility: CLINIC | Age: 29
End: 2024-09-05
Payer: COMMERCIAL

## 2024-09-05 VITALS — BODY MASS INDEX: 39.08 KG/M2 | HEIGHT: 61 IN | WEIGHT: 207 LBS

## 2024-09-05 DIAGNOSIS — E66.9 OBESITY, CLASS II, BMI 35-39.9: ICD-10-CM

## 2024-09-05 DIAGNOSIS — Z98.84 S/P LAPAROSCOPIC SLEEVE GASTRECTOMY: Primary | ICD-10-CM

## 2024-09-05 PROBLEM — E66.812 OBESITY, CLASS II, BMI 35-39.9: Status: ACTIVE | Noted: 2024-09-05

## 2024-09-06 ENCOUNTER — APPOINTMENT (OUTPATIENT)
Dept: SURGERY | Facility: CLINIC | Age: 29
End: 2024-09-06
Payer: COMMERCIAL

## 2024-09-13 ENCOUNTER — APPOINTMENT (OUTPATIENT)
Dept: SURGERY | Facility: CLINIC | Age: 29
End: 2024-09-13
Payer: COMMERCIAL

## 2024-09-24 ENCOUNTER — HOSPITAL ENCOUNTER (OUTPATIENT)
Dept: RADIOLOGY | Facility: HOSPITAL | Age: 29
Discharge: HOME | End: 2024-09-24
Payer: COMMERCIAL

## 2024-09-24 DIAGNOSIS — R10.11 RIGHT UPPER QUADRANT ABDOMINAL PAIN: ICD-10-CM

## 2024-09-24 DIAGNOSIS — K44.9 HIATAL HERNIA WITH GERD AND ESOPHAGITIS: ICD-10-CM

## 2024-09-24 DIAGNOSIS — K21.00 HIATAL HERNIA WITH GERD AND ESOPHAGITIS: ICD-10-CM

## 2024-09-24 DIAGNOSIS — Z98.84 S/P LAPAROSCOPIC SLEEVE GASTRECTOMY: ICD-10-CM

## 2024-09-24 PROCEDURE — 2500000005 HC RX 250 GENERAL PHARMACY W/O HCPCS: Performed by: SURGERY

## 2024-09-24 PROCEDURE — 74220 X-RAY XM ESOPHAGUS 1CNTRST: CPT

## 2024-09-24 PROCEDURE — 74221 X-RAY XM ESOPHAGUS 2CNTRST: CPT | Performed by: RADIOLOGY

## 2024-10-07 ENCOUNTER — TELEPHONE (OUTPATIENT)
Dept: SURGERY | Facility: CLINIC | Age: 29
End: 2024-10-07
Payer: COMMERCIAL

## 2024-10-07 NOTE — TELEPHONE ENCOUNTER
This RN received the results of the esophagram from Dr. Salmon for this patient. Per Dr. Salmon the results were normal and wanted RN to assess if patient was still having pain while eating. This RN called and spoke to patient in regards to the results and the patient educated RN that she is still having pain/ discomfort a couple times per day when eating. RN made Dr. Salmon aware and educated patient that if Dr. Salmon would like to do any further testing, RN would call the patient and patient verbalized understanding.

## 2024-10-15 ENCOUNTER — TELEPHONE (OUTPATIENT)
Dept: SURGERY | Facility: CLINIC | Age: 29
End: 2024-10-15
Payer: COMMERCIAL

## 2024-10-16 ENCOUNTER — TELEPHONE (OUTPATIENT)
Dept: SURGERY | Facility: CLINIC | Age: 29
End: 2024-10-16
Payer: COMMERCIAL

## 2024-10-16 NOTE — TELEPHONE ENCOUNTER
Thank you for speaking with me earlier today & confirming your scheduled visit with Dr. Salmon on 10/17/24 8:45 am Virtually.  Adri Kaufman, [facility] Nurse.

## 2024-10-17 ENCOUNTER — APPOINTMENT (OUTPATIENT)
Dept: SURGERY | Facility: CLINIC | Age: 29
End: 2024-10-17
Payer: COMMERCIAL

## 2024-10-17 DIAGNOSIS — E66.812 OBESITY, CLASS II, BMI 35-39.9: ICD-10-CM

## 2024-10-17 DIAGNOSIS — Z98.84 S/P LAPAROSCOPIC SLEEVE GASTRECTOMY: ICD-10-CM

## 2024-10-17 DIAGNOSIS — K21.00 HIATAL HERNIA WITH GERD AND ESOPHAGITIS: ICD-10-CM

## 2024-10-17 DIAGNOSIS — K44.9 HIATAL HERNIA WITH GERD AND ESOPHAGITIS: ICD-10-CM

## 2024-10-17 DIAGNOSIS — K91.2 INTESTINAL MALABSORPTION FOLLOWING GASTRECTOMY (HHS-HCC): ICD-10-CM

## 2024-10-17 DIAGNOSIS — Z90.3 INTESTINAL MALABSORPTION FOLLOWING GASTRECTOMY (HHS-HCC): ICD-10-CM

## 2024-10-17 DIAGNOSIS — R13.19 ESOPHAGEAL DYSPHAGIA: Primary | ICD-10-CM

## 2024-10-17 PROCEDURE — 99214 OFFICE O/P EST MOD 30 MIN: CPT | Performed by: SURGERY

## 2024-10-17 PROCEDURE — 1036F TOBACCO NON-USER: CPT | Performed by: SURGERY

## 2024-10-17 NOTE — PROGRESS NOTES
BARIATRIC SURGERY CLINIC  FOLLOW UP NOTE      Name: Erick Becerril  MRN: [id number]      Index Surgery  Date of Surgery: 6/4/24   Surgeon: Nacho Salmon MD  Surgical Procedure: Laparoscopic sleeve gastrectomy  62237 and Hiatal hernia repair 76129  Initial weight: 261 lbs  Pre-surgical weight: 267 lbs      Other Bariatric Surgeries  none    Visit: 4    months  Today's Visit:   Current weight: 196 lbs  Wt Readings from Last 1 Encounters:   09/05/24 93.9 kg (207 lb)    There is no height or weight on file to calculate BMI.   Last Visit:    Wt Readings from Last 3 Encounters:   09/05/24 93.9 kg (207 lb)   08/27/24 96.9 kg (213 lb 10 oz)   07/30/24 102 kg (224 lb)       Total weight loss: 70 lbs    HPI: She still has some intermittent chest/epigastric pain that sometimes follows eating and drinking. It feels like the pain just hangs in her chest for a second and then goes away as the food or drink passes through. Denies N/V/regurgitation. Diarrhea has resolved.     EGD: normal anatomy of sleeve, no hernia recurrence, no strictures.  Esophagram: intermittent fullness of distal esophagus.     DIET INTAKE: Soft food    Diet History:     Fluid intake: 64 oz/day  Eating 3 meals a day.      DAILY SUPPLEMENTS:  Calcium: Calcium Citrate w/ vitamin D (1200 - 1500mg)  Multivitamin & Minerals: 2 per day - bariatric fusion combo vitamin with iron  Iron Supplement: included in multi-vitamin  Vitamin B12: included in multi-vitamin  Vitamin D3: included in multi-vitamin  Other: none  PPI:prn omeprazole    EXERCISE: 3 days a week cardio at the gym, on days she doesn't go to the gym she goes for a walk with her kids      Current Outpatient Medications   Medication Sig Dispense Refill    acetaminophen (Tylenol) 325 mg tablet Take 2 tablets (650 mg) by mouth every 6 hours. (Patient taking differently: Take 2 tablets (650 mg) by mouth every 6 hours. 10/16/24  Pt verbalizes taking PRN [facility] LPN)      ALPRAZolam (Xanax) 0.5 mg tablet take 1 to 2  tablets by mouth once daily if needed for anxiety      cholecalciferol (Vitamin D3) 50 MCG (2000 UT) tablet Take 1 tablet (2,000 Units) by mouth once daily.      FLUoxetine (PROzac) 20 mg capsule Take 1 capsule (20 mg) by mouth once daily.      multivitamin-min-iron-FA-vit K 45 mg iron- 800 mcg-120 mcg capsule Take 1 tablet by mouth 2 times a day.      omeprazole (PriLOSEC) 40 mg DR capsule Take 1 capsule (40 mg) by mouth once daily in the morning. Take before meals. Do not crush or chew. Open capsule, sprinkle beads on SF jello, pudding or applesauce. 30 capsule 5    ondansetron ODT (Zofran-ODT) 4 mg disintegrating tablet Take 1 tablet (4 mg) by mouth every 6 hours if needed for nausea or vomiting. 15 tablet 1    simethicone (Mylicon) 80 mg chewable tablet Chew 1 tablet (80 mg) every 4 hours if needed for flatulence (gas pain).      UNABLE TO FIND Take 1 tablet by mouth once daily. Med Name: Baritastic Calcium Supplement       No current facility-administered medications for this visit.       Comorbidities:  No problem-specific Assessment & Plan notes found for this encounter.        REVIEW OF SYSTEMS:  CONSTITUTIONAL: Patient denies fevers, chills, sweats and weight changes.  EYES: Patient denies any visual symptoms.  EARS, NOSE, AND THROAT: No difficulties with hearing. No symptoms of rhinitis or sore throat.  CARDIOVASCULAR: Patient denies chest pains, palpitations, orthopnea and paroxysmal nocturnal dyspnea.  RESPIRATORY: No dyspnea on exertion, no wheezing or cough.  GI: No nausea, vomiting, diarrhea, constipation, abdominal pain, hematochezia or melena.  : No urinary hesitancy or dribbling. No nocturia or urinary frequency. No abnormal urethral discharge.  MUSCULOSKELETAL: No myalgias or arthralgias.  NEUROLOGIC: No chronic headaches, no seizures. Patient denies numbness, tingling or weakness.  PSYCHIATRIC: Patient denies problems with mood disturbance. No problems with anxiety.  ENDOCRINE: No excessive  "urination or excessive thirst.  DERMATOLOGIC: Patient denies any rashes or skin changes.    PHYSICAL EXAM:  No vital signs and a limited physical exam was conducted during this virtual visit.         A/P: Normal post-OP course except for some intermittent dysphagia that correlates with the \"intermittent fullness of the distal esophagus\" seen on esophagram.     Will order esophageal manometry.     No sign of infection, Doing well, tolerating diet, and Mobility improved    Energy: Energy good    Weight loss: Steady    Recommendations: Fluid intake 60 oz/day, Protein 60 g/day, Plan your meals, Continue taking vitamins as directed., and 3 month labs    Follow up: after manometry completed.    Nacho Salmon MD  Bariatric and Minimally Invasive General Surgery                "

## 2024-10-21 ENCOUNTER — LAB (OUTPATIENT)
Dept: LAB | Facility: LAB | Age: 29
End: 2024-10-21
Payer: COMMERCIAL

## 2024-10-21 DIAGNOSIS — Z98.84 S/P LAPAROSCOPIC SLEEVE GASTRECTOMY: ICD-10-CM

## 2024-10-21 DIAGNOSIS — Z90.3 INTESTINAL MALABSORPTION FOLLOWING GASTRECTOMY (HHS-HCC): ICD-10-CM

## 2024-10-21 DIAGNOSIS — K44.9 HIATAL HERNIA WITH GERD AND ESOPHAGITIS: ICD-10-CM

## 2024-10-21 DIAGNOSIS — K91.2 INTESTINAL MALABSORPTION FOLLOWING GASTRECTOMY (HHS-HCC): ICD-10-CM

## 2024-10-21 DIAGNOSIS — K21.00 HIATAL HERNIA WITH GERD AND ESOPHAGITIS: ICD-10-CM

## 2024-10-21 LAB
25(OH)D3 SERPL-MCNC: 35 NG/ML (ref 30–100)
ALBUMIN SERPL BCP-MCNC: 4.2 G/DL (ref 3.4–5)
ALP SERPL-CCNC: 63 U/L (ref 33–110)
ALT SERPL W P-5'-P-CCNC: 20 U/L (ref 7–45)
ANION GAP SERPL CALC-SCNC: 16 MMOL/L (ref 10–20)
AST SERPL W P-5'-P-CCNC: 20 U/L (ref 9–39)
BASOPHILS # BLD AUTO: 0.03 X10*3/UL (ref 0–0.1)
BASOPHILS NFR BLD AUTO: 0.6 %
BILIRUB SERPL-MCNC: 0.6 MG/DL (ref 0–1.2)
BUN SERPL-MCNC: 10 MG/DL (ref 6–23)
CALCIUM SERPL-MCNC: 9.4 MG/DL (ref 8.6–10.6)
CHLORIDE SERPL-SCNC: 105 MMOL/L (ref 98–107)
CO2 SERPL-SCNC: 25 MMOL/L (ref 21–32)
CREAT SERPL-MCNC: 0.62 MG/DL (ref 0.5–1.05)
EGFRCR SERPLBLD CKD-EPI 2021: >90 ML/MIN/1.73M*2
EOSINOPHIL # BLD AUTO: 0.14 X10*3/UL (ref 0–0.7)
EOSINOPHIL NFR BLD AUTO: 2.9 %
ERYTHROCYTE [DISTWIDTH] IN BLOOD BY AUTOMATED COUNT: 13.2 % (ref 11.5–14.5)
EST. AVERAGE GLUCOSE BLD GHB EST-MCNC: 105 MG/DL
FERRITIN SERPL-MCNC: 129 NG/ML (ref 8–150)
FOLATE SERPL-MCNC: 6.5 NG/ML
GLUCOSE SERPL-MCNC: 88 MG/DL (ref 74–99)
HBA1C MFR BLD: 5.3 %
HCT VFR BLD AUTO: 37.9 % (ref 36–46)
HGB BLD-MCNC: 12.2 G/DL (ref 12–16)
IMM GRANULOCYTES # BLD AUTO: 0.05 X10*3/UL (ref 0–0.7)
IMM GRANULOCYTES NFR BLD AUTO: 1 % (ref 0–0.9)
IRON SATN MFR SERPL: 23 % (ref 25–45)
IRON SERPL-MCNC: 70 UG/DL (ref 35–150)
LYMPHOCYTES # BLD AUTO: 1.21 X10*3/UL (ref 1.2–4.8)
LYMPHOCYTES NFR BLD AUTO: 25.1 %
MCH RBC QN AUTO: 28.9 PG (ref 26–34)
MCHC RBC AUTO-ENTMCNC: 32.2 G/DL (ref 32–36)
MCV RBC AUTO: 90 FL (ref 80–100)
MONOCYTES # BLD AUTO: 0.43 X10*3/UL (ref 0.1–1)
MONOCYTES NFR BLD AUTO: 8.9 %
NEUTROPHILS # BLD AUTO: 2.97 X10*3/UL (ref 1.2–7.7)
NEUTROPHILS NFR BLD AUTO: 61.5 %
NRBC BLD-RTO: 0 /100 WBCS (ref 0–0)
PLATELET # BLD AUTO: 280 X10*3/UL (ref 150–450)
POTASSIUM SERPL-SCNC: 3.8 MMOL/L (ref 3.5–5.3)
PROT SERPL-MCNC: 7 G/DL (ref 6.4–8.2)
RBC # BLD AUTO: 4.22 X10*6/UL (ref 4–5.2)
SODIUM SERPL-SCNC: 142 MMOL/L (ref 136–145)
TIBC SERPL-MCNC: 307 UG/DL (ref 240–445)
UIBC SERPL-MCNC: 237 UG/DL (ref 110–370)
VIT B12 SERPL-MCNC: 296 PG/ML (ref 211–911)
WBC # BLD AUTO: 4.8 X10*3/UL (ref 4.4–11.3)

## 2024-10-21 PROCEDURE — 83970 ASSAY OF PARATHORMONE: CPT

## 2024-10-21 PROCEDURE — 83540 ASSAY OF IRON: CPT

## 2024-10-21 PROCEDURE — 80053 COMPREHEN METABOLIC PANEL: CPT

## 2024-10-21 PROCEDURE — 82746 ASSAY OF FOLIC ACID SERUM: CPT

## 2024-10-21 PROCEDURE — 82306 VITAMIN D 25 HYDROXY: CPT

## 2024-10-21 PROCEDURE — 85025 COMPLETE CBC W/AUTO DIFF WBC: CPT

## 2024-10-21 PROCEDURE — 36415 COLL VENOUS BLD VENIPUNCTURE: CPT

## 2024-10-21 PROCEDURE — 82728 ASSAY OF FERRITIN: CPT

## 2024-10-21 PROCEDURE — 82607 VITAMIN B-12: CPT

## 2024-10-21 PROCEDURE — 83036 HEMOGLOBIN GLYCOSYLATED A1C: CPT

## 2024-10-21 PROCEDURE — 84425 ASSAY OF VITAMIN B-1: CPT

## 2024-10-21 PROCEDURE — 83550 IRON BINDING TEST: CPT

## 2024-10-22 LAB — PTH-INTACT SERPL-MCNC: 74.5 PG/ML (ref 18.5–88)

## 2024-10-24 LAB — VIT B1 PYROPHOSHATE BLD-SCNC: 62 NMOL/L (ref 70–180)

## 2024-11-11 ENCOUNTER — TELEPHONE (OUTPATIENT)
Dept: SURGERY | Facility: CLINIC | Age: 29
End: 2024-11-11
Payer: COMMERCIAL

## 2024-11-11 DIAGNOSIS — E51.9 VITAMIN B1 DEFICIENCY: ICD-10-CM

## 2024-11-11 NOTE — TELEPHONE ENCOUNTER
This RN attempted to call patient to go over lab results that were ordered by Dr Salmon. RN left voicemail for patient to call RN back to go over the results.

## 2024-11-11 NOTE — PROGRESS NOTES
Patient called RN back and RN educated patient that due to her deficient vitamin B1, she is to get over the counter thiamine and begin to take daily. In 4 weeks we will retest patient's vitamin B1. Patient verbalized understanding.

## 2024-11-13 ENCOUNTER — HOSPITAL ENCOUNTER (OUTPATIENT)
Dept: GASTROENTEROLOGY | Facility: HOSPITAL | Age: 29
Discharge: HOME | End: 2024-11-13
Payer: COMMERCIAL

## 2024-11-13 ENCOUNTER — APPOINTMENT (OUTPATIENT)
Dept: ORTHOPEDIC SURGERY | Facility: CLINIC | Age: 29
End: 2024-11-13
Payer: COMMERCIAL

## 2024-11-13 ENCOUNTER — APPOINTMENT (OUTPATIENT)
Dept: GASTROENTEROLOGY | Facility: HOSPITAL | Age: 29
End: 2024-11-13
Payer: COMMERCIAL

## 2024-11-13 VITALS
DIASTOLIC BLOOD PRESSURE: 63 MMHG | SYSTOLIC BLOOD PRESSURE: 117 MMHG | OXYGEN SATURATION: 99 % | HEART RATE: 76 BPM | RESPIRATION RATE: 19 BRPM

## 2024-11-13 DIAGNOSIS — Z98.84 S/P LAPAROSCOPIC SLEEVE GASTRECTOMY: ICD-10-CM

## 2024-11-13 DIAGNOSIS — R13.19 ESOPHAGEAL DYSPHAGIA: ICD-10-CM

## 2024-11-13 DIAGNOSIS — K21.00 HIATAL HERNIA WITH GERD AND ESOPHAGITIS: ICD-10-CM

## 2024-11-13 DIAGNOSIS — K44.9 HIATAL HERNIA WITH GERD AND ESOPHAGITIS: ICD-10-CM

## 2024-11-13 PROCEDURE — 91010 ESOPHAGUS MOTILITY STUDY: CPT | Performed by: SURGERY

## 2024-11-13 ASSESSMENT — PAIN - FUNCTIONAL ASSESSMENT
PAIN_FUNCTIONAL_ASSESSMENT: 0-10
PAIN_FUNCTIONAL_ASSESSMENT: 0-10

## 2024-11-13 ASSESSMENT — PAIN SCALES - GENERAL
PAINLEVEL_OUTOF10: 0 - NO PAIN
PAINLEVEL_OUTOF10: 0 - NO PAIN

## 2024-11-13 NOTE — DISCHARGE INSTRUCTIONS
Return to normal diet, medications and activities. Today, you may experience slight nasal and throat discomfort along with minimal nosebleed. Follow up with ordering provider in about two weeks for results. Please call [phone] if you need to speak with the manometry nurse about your test.

## 2024-11-13 NOTE — NURSING NOTE
Esophageal Manometry Test    Referring Provider: Nacho Salmon MD  [id number]  [place] [address]    Indication: Dysphagia    Symptoms: Esophageal dysphagia [R13.19], Hiatal hernia with GERD and esophagitis [K44.9, K21.00], S/P laparoscopic sleeve gastrectomy [Z98.84]     Age: 29 y.o.    EMOT catheter calibrated.  Procedure explained-all questions answered. Placed 2% lido viscous bilateral nares via syringe. Probe placed right nare without difficulty, placed left lateral position with HOB elevated 20 degrees per/protocol. Probe adjusted for proper positioning. Swallows performed per/protocol, probe removed intact without difficulty. Discharged patient to home in stable condition without complaint.

## 2024-11-14 ENCOUNTER — APPOINTMENT (OUTPATIENT)
Dept: SURGERY | Facility: CLINIC | Age: 29
End: 2024-11-14
Payer: COMMERCIAL

## 2024-11-25 ENCOUNTER — HOSPITAL ENCOUNTER (EMERGENCY)
Facility: HOSPITAL | Age: 29
Discharge: HOME | End: 2024-11-25
Attending: EMERGENCY MEDICINE
Payer: COMMERCIAL

## 2024-11-25 ENCOUNTER — APPOINTMENT (OUTPATIENT)
Dept: CARDIOLOGY | Facility: HOSPITAL | Age: 29
End: 2024-11-25
Payer: COMMERCIAL

## 2024-11-25 ENCOUNTER — APPOINTMENT (OUTPATIENT)
Dept: RADIOLOGY | Facility: HOSPITAL | Age: 29
End: 2024-11-25
Payer: COMMERCIAL

## 2024-11-25 VITALS
RESPIRATION RATE: 22 BRPM | HEART RATE: 75 BPM | WEIGHT: 187 LBS | TEMPERATURE: 98.6 F | DIASTOLIC BLOOD PRESSURE: 65 MMHG | HEIGHT: 62 IN | OXYGEN SATURATION: 100 % | BODY MASS INDEX: 34.41 KG/M2 | SYSTOLIC BLOOD PRESSURE: 99 MMHG

## 2024-11-25 DIAGNOSIS — R00.2 PALPITATIONS: Primary | ICD-10-CM

## 2024-11-25 DIAGNOSIS — E87.6 HYPOKALEMIA: ICD-10-CM

## 2024-11-25 LAB
ALBUMIN SERPL BCP-MCNC: 4.4 G/DL (ref 3.4–5)
ALP SERPL-CCNC: 68 U/L (ref 33–110)
ALT SERPL W P-5'-P-CCNC: 8 U/L (ref 7–45)
ANION GAP SERPL CALC-SCNC: 15 MMOL/L (ref 10–20)
AST SERPL W P-5'-P-CCNC: 11 U/L (ref 9–39)
BASOPHILS # BLD AUTO: 0.05 X10*3/UL (ref 0–0.1)
BASOPHILS NFR BLD AUTO: 0.5 %
BILIRUB SERPL-MCNC: 0.5 MG/DL (ref 0–1.2)
BUN SERPL-MCNC: 12 MG/DL (ref 6–23)
CALCIUM SERPL-MCNC: 9.3 MG/DL (ref 8.6–10.3)
CARDIAC TROPONIN I PNL SERPL HS: <3 NG/L (ref 0–13)
CHLORIDE SERPL-SCNC: 103 MMOL/L (ref 98–107)
CO2 SERPL-SCNC: 25 MMOL/L (ref 21–32)
CREAT SERPL-MCNC: 0.6 MG/DL (ref 0.5–1.05)
EGFRCR SERPLBLD CKD-EPI 2021: >90 ML/MIN/1.73M*2
EOSINOPHIL # BLD AUTO: 0.09 X10*3/UL (ref 0–0.7)
EOSINOPHIL NFR BLD AUTO: 0.9 %
ERYTHROCYTE [DISTWIDTH] IN BLOOD BY AUTOMATED COUNT: 13 % (ref 11.5–14.5)
GLUCOSE SERPL-MCNC: 86 MG/DL (ref 74–99)
HCT VFR BLD AUTO: 38.1 % (ref 36–46)
HGB BLD-MCNC: 12.5 G/DL (ref 12–16)
IMM GRANULOCYTES # BLD AUTO: 0.02 X10*3/UL (ref 0–0.7)
IMM GRANULOCYTES NFR BLD AUTO: 0.2 % (ref 0–0.9)
LYMPHOCYTES # BLD AUTO: 2.52 X10*3/UL (ref 1.2–4.8)
LYMPHOCYTES NFR BLD AUTO: 25.8 %
MAGNESIUM SERPL-MCNC: 2.19 MG/DL (ref 1.6–2.4)
MCH RBC QN AUTO: 28.7 PG (ref 26–34)
MCHC RBC AUTO-ENTMCNC: 32.8 G/DL (ref 32–36)
MCV RBC AUTO: 88 FL (ref 80–100)
MONOCYTES # BLD AUTO: 0.51 X10*3/UL (ref 0.1–1)
MONOCYTES NFR BLD AUTO: 5.2 %
NEUTROPHILS # BLD AUTO: 6.56 X10*3/UL (ref 1.2–7.7)
NEUTROPHILS NFR BLD AUTO: 67.4 %
NRBC BLD-RTO: 0 /100 WBCS (ref 0–0)
PLATELET # BLD AUTO: 349 X10*3/UL (ref 150–450)
POTASSIUM SERPL-SCNC: 3.3 MMOL/L (ref 3.5–5.3)
PROT SERPL-MCNC: 7.8 G/DL (ref 6.4–8.2)
RBC # BLD AUTO: 4.35 X10*6/UL (ref 4–5.2)
SODIUM SERPL-SCNC: 140 MMOL/L (ref 136–145)
WBC # BLD AUTO: 9.8 X10*3/UL (ref 4.4–11.3)

## 2024-11-25 PROCEDURE — 93005 ELECTROCARDIOGRAM TRACING: CPT | Mod: 59

## 2024-11-25 PROCEDURE — 93005 ELECTROCARDIOGRAM TRACING: CPT

## 2024-11-25 PROCEDURE — 99283 EMERGENCY DEPT VISIT LOW MDM: CPT | Mod: 25

## 2024-11-25 PROCEDURE — 71045 X-RAY EXAM CHEST 1 VIEW: CPT | Performed by: RADIOLOGY

## 2024-11-25 PROCEDURE — 36415 COLL VENOUS BLD VENIPUNCTURE: CPT | Performed by: EMERGENCY MEDICINE

## 2024-11-25 PROCEDURE — 84484 ASSAY OF TROPONIN QUANT: CPT | Performed by: EMERGENCY MEDICINE

## 2024-11-25 PROCEDURE — 71045 X-RAY EXAM CHEST 1 VIEW: CPT

## 2024-11-25 PROCEDURE — 85025 COMPLETE CBC W/AUTO DIFF WBC: CPT | Performed by: EMERGENCY MEDICINE

## 2024-11-25 PROCEDURE — 80053 COMPREHEN METABOLIC PANEL: CPT | Performed by: EMERGENCY MEDICINE

## 2024-11-25 PROCEDURE — 83735 ASSAY OF MAGNESIUM: CPT | Performed by: EMERGENCY MEDICINE

## 2024-11-25 RX ORDER — POTASSIUM CHLORIDE 20 MEQ/1
40 TABLET, EXTENDED RELEASE ORAL DAILY
Status: DISCONTINUED | OUTPATIENT
Start: 2024-11-26 | End: 2024-11-26 | Stop reason: HOSPADM

## 2024-11-25 ASSESSMENT — PAIN - FUNCTIONAL ASSESSMENT: PAIN_FUNCTIONAL_ASSESSMENT: 0-10

## 2024-11-25 ASSESSMENT — PAIN SCALES - GENERAL: PAINLEVEL_OUTOF10: 4

## 2024-11-26 LAB
ATRIAL RATE: 72 BPM
P AXIS: 23 DEGREES
P OFFSET: 204 MS
P ONSET: 156 MS
PR INTERVAL: 130 MS
Q ONSET: 221 MS
QRS COUNT: 12 BEATS
QRS DURATION: 86 MS
QT INTERVAL: 406 MS
QTC CALCULATION(BAZETT): 444 MS
QTC FREDERICIA: 431 MS
R AXIS: 25 DEGREES
T AXIS: 1 DEGREES
T OFFSET: 424 MS
VENTRICULAR RATE: 72 BPM

## 2024-11-26 NOTE — ED PROVIDER NOTES
HPI   Chief Complaint   Patient presents with    Palpitations    Dizziness    Back Pain       Erick is a 29-year-old female who presents with palpitations.  It has been coming going for about 2 weeks now.  She feels like all of a sudden her heart is fluttering skipped some beats.  When happen she feels lightheaded.  Lasts a few minutes and goes away.  Happens with activity but also at rest.  She denies any fever chills cough or cold.  She has a past medical history of hysterectomy gallbladder removal and a gastric sleeve done in June.  She has had no nausea or vomiting or abdominal complaints.  Historian is patient and EMR.  She denies any previous heart history or taking any blood pressure medication.              Patient History   Past Medical History:   Diagnosis Date    Anxiety     Bariatric surgery status     S/P laparoscopic sleeve gastrectomy    Class 3 severe obesity with body mass index (BMI) of 40.0 to 44.9 in adult 07/30/2024    42.32 kg/m²    Cystic fibrosis carrier     Encounter for pre-operative cardiovascular clearance 01/05/2024    Alexander Granado MD for Bariatric Procedure    Furuncle of umbilicus     Hiatal hernia with GERD and esophagitis     Insomnia     Morbid obesity with BMI of 45.0-49.9, adult (Multi) 05/24/2024    48.87 kg/m²    PCOS (polycystic ovarian syndrome)     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 [facility] [place]    [place]Y      SLEEVE GASTROPLASTY  06/04/2024    [place] [facility] [place]    TUBAL LIGATION      WISDOM TOOTH EXTRACTION       Family History   Problem Relation  Name Age of Onset    Skin cancer Mother      No Known Problems Father      Diabetes Paternal Grandfather Zeferino      Social History     Tobacco Use    Smoking status: Never     Passive exposure: Never (10/16/24  Pt verifies verbally [facility] LPN)    Smokeless tobacco: Never    Tobacco comments:     10/16/24:  Erick verifies verbally [facility] LPN   Vaping Use    Vaping status: Never Used   Substance Use Topics    Alcohol use: Not Currently     Comment: 10/16/24:  Erick verifies verbally [facility] LPN    Drug use: Not Currently     Types: Benzodiazepines     Comment: 10/16/24:  Erick boboifies verbally has prescribed Benzo PRN [facility] LPN       Physical Exam   ED Triage Vitals [11/25/24 1917]   Temperature Heart Rate Respirations BP   37 °C (98.6 °F) 62 16 122/82      Pulse Ox Temp src Heart Rate Source Patient Position   100 % -- -- --      BP Location FiO2 (%)     -- --       Physical Exam  Vitals reviewed.   Constitutional:       General: She is awake.      Appearance: Normal appearance.   HENT:      Head: Normocephalic.      Nose: Nose normal.   Cardiovascular:      Rate and Rhythm: Normal rate and regular rhythm.      Comments: No irregular beats heard during auscultation.  No murmur  Pulmonary:      Effort: Pulmonary effort is normal.      Breath sounds: Normal breath sounds.   Abdominal:      Palpations: Abdomen is soft.   Musculoskeletal:      Cervical back: Normal range of motion.   Skin:     General: Skin is warm.      Capillary Refill: Capillary refill takes less than 2 seconds.   Neurological:      Mental Status: She is alert.           ED Course & MDM   ED Course as of 11/25/24 2217   Mon Nov 25, 2024 2045 Patient presents with palpitations.  Currently not having them.  When she gets them she feels little lightheaded.  It has been coming going for about 2 weeks happens at rest and with activity.  Plan is to workup with chest x-ray and labs.  I discussed differential diagnosis with the patient which includes electrolyte  abnormalities anemia cardiac arrhythmia mitral valve prolapse [RZ]   2050 EKG done at 1916 interpreted by me shows normal sinus rhythm at 72 bpm with nonspecific T wave abnormality no STEMI similar to old EKG January 5, 2024 [RZ]   2216 On exam patient stable was found to have slightly low potassium will be given some potassium.  I believe she needs follow-up with cardiology.  I gave her the name of her cardiologist on-call.  She is agreeable with plan.  We talked about return indications and follow-up instructions.  She be given some supplemental oral potassium I do not believe that is the cause of the symptoms. [RZ]      ED Course User Index  [RZ] Joel Childers MD         Diagnoses as of 11/25/24 2217   Palpitations   Hypokalemia                 No data recorded                                 Medical Decision Making      Procedure  Procedures     Joel Childers MD  11/25/24 2217

## 2024-11-26 NOTE — ED TRIAGE NOTES
Pt presents ambulatory via POV through triage from home for c/o episodes of palpitations and dizziness for several weeks. Pt states they are becoming more frequent and now having chest pain with them. Pt has Hx of bariatric surgery in June with Dr. Salmon and has had trouble eating/drinking due to pain ever since. Pt also has c/o low back for the past 3 days, denies any injury. X given by EMS PTA. IV # placed by EMS PTA. Pt placed on cardiac monitor. Call light within reach.

## 2024-12-03 ENCOUNTER — TELEPHONE (OUTPATIENT)
Dept: SURGERY | Facility: CLINIC | Age: 29
End: 2024-12-03
Payer: COMMERCIAL

## 2024-12-03 NOTE — TELEPHONE ENCOUNTER
Thank you for speaking with me earlier today & confirming your scheduled visit with Dr. Salmon on 12/6/24 at 10:00 am at [facility] (inside [facility], main floor in the [facility]).   Parking is available at a cost of $5.00 at the Main Entrance.  We look forward to seeing you!  Maria Elena Bates RN, Clinic Nurse

## 2024-12-05 ENCOUNTER — APPOINTMENT (OUTPATIENT)
Dept: SURGERY | Facility: CLINIC | Age: 29
End: 2024-12-05
Payer: COMMERCIAL

## 2024-12-05 VITALS — HEIGHT: 62 IN | WEIGHT: 185.2 LBS | BODY MASS INDEX: 34.08 KG/M2

## 2024-12-05 DIAGNOSIS — Z98.84 S/P LAPAROSCOPIC SLEEVE GASTRECTOMY: Primary | ICD-10-CM

## 2024-12-06 ENCOUNTER — APPOINTMENT (OUTPATIENT)
Dept: SURGERY | Facility: CLINIC | Age: 29
End: 2024-12-06
Payer: COMMERCIAL

## 2024-12-06 VITALS
HEART RATE: 71 BPM | DIASTOLIC BLOOD PRESSURE: 63 MMHG | WEIGHT: 185.5 LBS | OXYGEN SATURATION: 99 % | HEIGHT: 62 IN | BODY MASS INDEX: 34.14 KG/M2 | SYSTOLIC BLOOD PRESSURE: 104 MMHG

## 2024-12-06 DIAGNOSIS — R13.19 ESOPHAGEAL DYSPHAGIA: Primary | ICD-10-CM

## 2024-12-06 DIAGNOSIS — K44.9 HIATAL HERNIA: ICD-10-CM

## 2024-12-06 DIAGNOSIS — Z98.84 S/P LAPAROSCOPIC SLEEVE GASTRECTOMY: ICD-10-CM

## 2024-12-06 PROCEDURE — 3008F BODY MASS INDEX DOCD: CPT | Performed by: SURGERY

## 2024-12-06 PROCEDURE — 1036F TOBACCO NON-USER: CPT | Performed by: SURGERY

## 2024-12-06 PROCEDURE — 99214 OFFICE O/P EST MOD 30 MIN: CPT | Performed by: SURGERY

## 2024-12-06 NOTE — H&P (VIEW-ONLY)
BARIATRIC SURGERY CLINIC  FOLLOW UP NOTE      Name: Erick Becerril  MRN: [id number]      Index Surgery  Date of Surgery: 6/4/24   Surgeon: Nacho Salmon MD  Surgical Procedure: Laparoscopic sleeve gastrectomy  82759 and Hiatal hernia repair 88966  Initial weight: 261 lbs  Pre-surgical weight: 267 lbs      Other Bariatric Surgeries  none    Visit: 6    months  Today's Visit:   Wt Readings from Last 1 Encounters:   12/06/24 84.1 kg (185 lb 8 oz)    Body mass index is 33.93 kg/m².   Last Visit:    Wt Readings from Last 3 Encounters:   12/06/24 84.1 kg (185 lb 8 oz)   12/05/24 84 kg (185 lb 3.2 oz)   11/25/24 84.8 kg (187 lb)       Total weight loss: 82 lbs    HPI: Continues to complain of chest/epigastric pain and fullness following eating a drinking. She feels like this limits her PO intake and she is prone to dehydration.     EGD: normal anatomy of sleeve, no hernia recurrence, no strictures.  Esophagram: intermittent fullness of distal esophagus.   Esophageal manometry: The study of the LES rveals a shortened length and a hiatal hernia. The LES pressure is normal at 32 mmHg and relaxes completely. The esophageal body study demonstrates normal peristalsis and contraction amplitudes (110 mmHg) and DCI of 2093. What is notable is the pressure distal to the hiatus. There is a lot of pressure noted in the gastric lumen which may be due to the altered anatomy. bolus transit is normal.     DIET INTAKE: Regular food      DAILY SUPPLEMENTS:  Calcium: Calcium Citrate w/ vitamin D (1200 - 1500mg)  Multivitamin & Minerals: 2 per day - bariatric fusion combo vitamin with iron  Iron Supplement: included in multi-vitamin  Vitamin B12: included in multi-vitamin  Vitamin D3: included in multi-vitamin  Other: none  PPI:none    EXERCISE: walking 3-4 days a week and some gym        Current Outpatient Medications   Medication Sig Dispense Refill    ALPRAZolam (Xanax) 0.5 mg tablet take 1 to 2 tablets by mouth once daily if needed for  "anxiety      cholecalciferol (Vitamin D3) 50 MCG (2000 UT) tablet Take 1 tablet (2,000 Units) by mouth once daily.      FLUoxetine (PROzac) 20 mg capsule Take 1 capsule (20 mg) by mouth once daily.      multivitamin-min-iron-FA-vit K 45 mg iron- 800 mcg-120 mcg capsule Take 1 tablet by mouth 2 times a day.      UNABLE TO FIND Take 1 tablet by mouth once daily. Med Name: Baritastic Calcium Supplement      acetaminophen (Tylenol) 325 mg tablet Take 2 tablets (650 mg) by mouth every 6 hours. (Patient taking differently: Take 2 tablets (650 mg) by mouth every 6 hours. 10/16/24  [facility]       No current facility-administered medications for this visit.       Comorbidities:  No problem-specific Assessment & Plan notes found for this encounter.        REVIEW OF SYSTEMS:  CONSTITUTIONAL: Patient denies fevers, chills, sweats and weight changes.  EYES: Patient denies any visual symptoms.  EARS, NOSE, AND THROAT: No difficulties with hearing. No symptoms of rhinitis or sore throat.  CARDIOVASCULAR: Patient denies chest pains, palpitations, orthopnea and paroxysmal nocturnal dyspnea.  RESPIRATORY: No dyspnea on exertion, no wheezing or cough.  GI: No nausea, vomiting, diarrhea, constipation, abdominal pain, hematochezia or melena.  : No urinary hesitancy or dribbling. No nocturia or urinary frequency. No abnormal urethral discharge.  MUSCULOSKELETAL: No myalgias or arthralgias.  NEUROLOGIC: No chronic headaches, no seizures. Patient denies numbness, tingling or weakness.  PSYCHIATRIC: Patient denies problems with mood disturbance. No problems with anxiety.  ENDOCRINE: No excessive urination or excessive thirst.  DERMATOLOGIC: Patient denies any rashes or skin changes.    PHYSICAL EXAM:  /63 (BP Location: Right arm, Patient Position: Sitting, BP Cuff Size: Large adult)   Pulse 71   Ht 1.575 m (5' 2\")   Wt 84.1 kg (185 lb 8 oz)   LMP 07/29/2022   SpO2 99%   BMI 33.93 kg/m²   GENERAL: " Obese. No apparent distress. Pt is alert and oriented x3.  HEENT: Head is normocephalic and atraumatic. Extraocular muscles are intact. Pupils are equal, round, and reactive to light and accommodation. Nares appeared normal. Mouth is well hydrated and without lesions. Mucous membranes are moist. Posterior pharynx clear of any exudate or lesions.  NECK: Supple. No carotid bruits. No lymphadenopathy or thyromegaly.  LUNGS: Clear to auscultation.  HEART: Regular rate and rhythm without murmur.  ABDOMEN: Soft, nontender, and nondistended. Positive bowel sounds. No hepatosplenomegaly was noted.  EXTREMITIES: Without any cyanosis, clubbing, rash, lesions or edema.  NEUROLOGIC: Cranial nerves II through XII are grossly intact.  PSYCHIATRIC: Flat affect, but denies suicidal or homicidal ideations.  SKIN: No ulceration or induration present.      A/P: Subtle recurrent hiatal hernia. It is unclear if this is causing her discomfort and dysphagia with PO intake or if her tight/high pressure sleeve is contributing.    Will start with an EGD and botox of the pylorus as a less invasive option to resolve her symptoms. If needed, can undertake a recurrent hiatal hernia repair in the future.       Recommendations: Continue taking vitamins as directed. and 6 months labs    Follow up:  3 months    Nacho Salmon MD  Bariatric and Minimally Invasive General Surgery

## 2024-12-06 NOTE — PROGRESS NOTES
BARIATRIC SURGERY CLINIC  FOLLOW UP NOTE      Name: Erick Becerril  MRN: [id number]      Index Surgery  Date of Surgery: 6/4/24   Surgeon: Nacho Salmon MD  Surgical Procedure: Laparoscopic sleeve gastrectomy  35494 and Hiatal hernia repair 78101  Initial weight: 261 lbs  Pre-surgical weight: 267 lbs      Other Bariatric Surgeries  none    Visit: 6    months  Today's Visit:   Wt Readings from Last 1 Encounters:   12/06/24 84.1 kg (185 lb 8 oz)    Body mass index is 33.93 kg/m².   Last Visit:    Wt Readings from Last 3 Encounters:   12/06/24 84.1 kg (185 lb 8 oz)   12/05/24 84 kg (185 lb 3.2 oz)   11/25/24 84.8 kg (187 lb)       Total weight loss: 82 lbs    HPI: Continues to complain of chest/epigastric pain and fullness following eating a drinking. She feels like this limits her PO intake and she is prone to dehydration.     EGD: normal anatomy of sleeve, no hernia recurrence, no strictures.  Esophagram: intermittent fullness of distal esophagus.   Esophageal manometry: The study of the LES rveals a shortened length and a hiatal hernia. The LES pressure is normal at 32 mmHg and relaxes completely. The esophageal body study demonstrates normal peristalsis and contraction amplitudes (110 mmHg) and DCI of 2093. What is notable is the pressure distal to the hiatus. There is a lot of pressure noted in the gastric lumen which may be due to the altered anatomy. bolus transit is normal.     DIET INTAKE: Regular food      DAILY SUPPLEMENTS:  Calcium: Calcium Citrate w/ vitamin D (1200 - 1500mg)  Multivitamin & Minerals: 2 per day - bariatric fusion combo vitamin with iron  Iron Supplement: included in multi-vitamin  Vitamin B12: included in multi-vitamin  Vitamin D3: included in multi-vitamin  Other: none  PPI:none    EXERCISE: walking 3-4 days a week and some gym        Current Outpatient Medications   Medication Sig Dispense Refill    ALPRAZolam (Xanax) 0.5 mg tablet take 1 to 2 tablets by mouth once daily if needed for  "anxiety      cholecalciferol (Vitamin D3) 50 MCG (2000 UT) tablet Take 1 tablet (2,000 Units) by mouth once daily.      FLUoxetine (PROzac) 20 mg capsule Take 1 capsule (20 mg) by mouth once daily.      multivitamin-min-iron-FA-vit K 45 mg iron- 800 mcg-120 mcg capsule Take 1 tablet by mouth 2 times a day.      UNABLE TO FIND Take 1 tablet by mouth once daily. Med Name: Baritastic Calcium Supplement      acetaminophen (Tylenol) 325 mg tablet Take 2 tablets (650 mg) by mouth every 6 hours. (Patient taking differently: Take 2 tablets (650 mg) by mouth every 6 hours. 10/16/24  [facility]       No current facility-administered medications for this visit.       Comorbidities:  No problem-specific Assessment & Plan notes found for this encounter.        REVIEW OF SYSTEMS:  CONSTITUTIONAL: Patient denies fevers, chills, sweats and weight changes.  EYES: Patient denies any visual symptoms.  EARS, NOSE, AND THROAT: No difficulties with hearing. No symptoms of rhinitis or sore throat.  CARDIOVASCULAR: Patient denies chest pains, palpitations, orthopnea and paroxysmal nocturnal dyspnea.  RESPIRATORY: No dyspnea on exertion, no wheezing or cough.  GI: No nausea, vomiting, diarrhea, constipation, abdominal pain, hematochezia or melena.  : No urinary hesitancy or dribbling. No nocturia or urinary frequency. No abnormal urethral discharge.  MUSCULOSKELETAL: No myalgias or arthralgias.  NEUROLOGIC: No chronic headaches, no seizures. Patient denies numbness, tingling or weakness.  PSYCHIATRIC: Patient denies problems with mood disturbance. No problems with anxiety.  ENDOCRINE: No excessive urination or excessive thirst.  DERMATOLOGIC: Patient denies any rashes or skin changes.    PHYSICAL EXAM:  /63 (BP Location: Right arm, Patient Position: Sitting, BP Cuff Size: Large adult)   Pulse 71   Ht 1.575 m (5' 2\")   Wt 84.1 kg (185 lb 8 oz)   LMP 07/29/2022   SpO2 99%   BMI 33.93 kg/m²   GENERAL: " Obese. No apparent distress. Pt is alert and oriented x3.  HEENT: Head is normocephalic and atraumatic. Extraocular muscles are intact. Pupils are equal, round, and reactive to light and accommodation. Nares appeared normal. Mouth is well hydrated and without lesions. Mucous membranes are moist. Posterior pharynx clear of any exudate or lesions.  NECK: Supple. No carotid bruits. No lymphadenopathy or thyromegaly.  LUNGS: Clear to auscultation.  HEART: Regular rate and rhythm without murmur.  ABDOMEN: Soft, nontender, and nondistended. Positive bowel sounds. No hepatosplenomegaly was noted.  EXTREMITIES: Without any cyanosis, clubbing, rash, lesions or edema.  NEUROLOGIC: Cranial nerves II through XII are grossly intact.  PSYCHIATRIC: Flat affect, but denies suicidal or homicidal ideations.  SKIN: No ulceration or induration present.      A/P: Subtle recurrent hiatal hernia. It is unclear if this is causing her discomfort and dysphagia with PO intake or if her tight/high pressure sleeve is contributing.    Will start with an EGD and botox of the pylorus as a less invasive option to resolve her symptoms. If needed, can undertake a recurrent hiatal hernia repair in the future.       Recommendations: Continue taking vitamins as directed. and 6 months labs    Follow up:  3 months    Nacho Salmon MD  Bariatric and Minimally Invasive General Surgery

## 2024-12-16 ENCOUNTER — ANESTHESIA EVENT (OUTPATIENT)
Dept: OPERATING ROOM | Facility: HOSPITAL | Age: 29
End: 2024-12-16
Payer: COMMERCIAL

## 2024-12-17 ENCOUNTER — HOSPITAL ENCOUNTER (OUTPATIENT)
Dept: OPERATING ROOM | Facility: HOSPITAL | Age: 29
Setting detail: OUTPATIENT SURGERY
Discharge: HOME | End: 2024-12-17
Payer: COMMERCIAL

## 2024-12-17 ENCOUNTER — ANESTHESIA (OUTPATIENT)
Dept: OPERATING ROOM | Facility: HOSPITAL | Age: 29
End: 2024-12-17
Payer: COMMERCIAL

## 2024-12-17 VITALS
SYSTOLIC BLOOD PRESSURE: 107 MMHG | WEIGHT: 182.98 LBS | HEIGHT: 62 IN | BODY MASS INDEX: 33.67 KG/M2 | TEMPERATURE: 97.3 F | DIASTOLIC BLOOD PRESSURE: 69 MMHG | RESPIRATION RATE: 16 BRPM | HEART RATE: 69 BPM | OXYGEN SATURATION: 98 %

## 2024-12-17 DIAGNOSIS — K21.00 HIATAL HERNIA WITH GERD AND ESOPHAGITIS: ICD-10-CM

## 2024-12-17 DIAGNOSIS — Z98.84 S/P LAPAROSCOPIC SLEEVE GASTRECTOMY: ICD-10-CM

## 2024-12-17 DIAGNOSIS — K44.9 HIATAL HERNIA WITH GERD AND ESOPHAGITIS: ICD-10-CM

## 2024-12-17 DIAGNOSIS — K44.9 HIATAL HERNIA: ICD-10-CM

## 2024-12-17 DIAGNOSIS — R13.19 ESOPHAGEAL DYSPHAGIA: ICD-10-CM

## 2024-12-17 PROBLEM — F41.9 ANXIETY: Status: ACTIVE | Noted: 2024-12-17

## 2024-12-17 PROCEDURE — 7100000009 HC PHASE TWO TIME - INITIAL BASE CHARGE: Performed by: STUDENT IN AN ORGANIZED HEALTH CARE EDUCATION/TRAINING PROGRAM

## 2024-12-17 PROCEDURE — A43236 PR ESOPHAGOGASTRODUODENOSCOPY SUBMUCOSAL INJECTION: Performed by: STUDENT IN AN ORGANIZED HEALTH CARE EDUCATION/TRAINING PROGRAM

## 2024-12-17 PROCEDURE — 3600000002 HC OR TIME - INITIAL BASE CHARGE - PROCEDURE LEVEL TWO: Performed by: STUDENT IN AN ORGANIZED HEALTH CARE EDUCATION/TRAINING PROGRAM

## 2024-12-17 PROCEDURE — 3700000002 HC GENERAL ANESTHESIA TIME - EACH INCREMENTAL 1 MINUTE: Performed by: STUDENT IN AN ORGANIZED HEALTH CARE EDUCATION/TRAINING PROGRAM

## 2024-12-17 PROCEDURE — 43236 UPPR GI SCOPE W/SUBMUC INJ: CPT | Performed by: SURGERY

## 2024-12-17 PROCEDURE — 7100000010 HC PHASE TWO TIME - EACH INCREMENTAL 1 MINUTE: Performed by: STUDENT IN AN ORGANIZED HEALTH CARE EDUCATION/TRAINING PROGRAM

## 2024-12-17 PROCEDURE — 3600000007 HC OR TIME - EACH INCREMENTAL 1 MINUTE - PROCEDURE LEVEL TWO: Performed by: STUDENT IN AN ORGANIZED HEALTH CARE EDUCATION/TRAINING PROGRAM

## 2024-12-17 PROCEDURE — 2500000004 HC RX 250 GENERAL PHARMACY W/ HCPCS (ALT 636 FOR OP/ED): Mod: JZ | Performed by: SURGERY

## 2024-12-17 PROCEDURE — 3700000001 HC GENERAL ANESTHESIA TIME - INITIAL BASE CHARGE: Performed by: STUDENT IN AN ORGANIZED HEALTH CARE EDUCATION/TRAINING PROGRAM

## 2024-12-17 PROCEDURE — 2720000007 HC OR 272 NO HCPCS: Performed by: STUDENT IN AN ORGANIZED HEALTH CARE EDUCATION/TRAINING PROGRAM

## 2024-12-17 PROCEDURE — 96372 THER/PROPH/DIAG INJ SC/IM: CPT | Performed by: SURGERY

## 2024-12-17 PROCEDURE — 2500000004 HC RX 250 GENERAL PHARMACY W/ HCPCS (ALT 636 FOR OP/ED): Performed by: REGISTERED NURSE

## 2024-12-17 PROCEDURE — A43236 PR ESOPHAGOGASTRODUODENOSCOPY SUBMUCOSAL INJECTION: Performed by: REGISTERED NURSE

## 2024-12-17 RX ORDER — ACETAMINOPHEN 325 MG/1
650 TABLET ORAL EVERY 4 HOURS PRN
Status: DISCONTINUED | OUTPATIENT
Start: 2024-12-17 | End: 2024-12-18 | Stop reason: HOSPADM

## 2024-12-17 RX ORDER — DEXMEDETOMIDINE IN 0.9 % NACL 20 MCG/5ML
SYRINGE (ML) INTRAVENOUS AS NEEDED
Status: DISCONTINUED | OUTPATIENT
Start: 2024-12-17 | End: 2024-12-17

## 2024-12-17 RX ORDER — MIDAZOLAM HYDROCHLORIDE 1 MG/ML
INJECTION INTRAMUSCULAR; INTRAVENOUS AS NEEDED
Status: DISCONTINUED | OUTPATIENT
Start: 2024-12-17 | End: 2024-12-17

## 2024-12-17 RX ORDER — FENTANYL CITRATE 50 UG/ML
INJECTION, SOLUTION INTRAMUSCULAR; INTRAVENOUS AS NEEDED
Status: DISCONTINUED | OUTPATIENT
Start: 2024-12-17 | End: 2024-12-17

## 2024-12-17 RX ORDER — PROPOFOL 10 MG/ML
INJECTION, EMULSION INTRAVENOUS AS NEEDED
Status: DISCONTINUED | OUTPATIENT
Start: 2024-12-17 | End: 2024-12-17

## 2024-12-17 RX ORDER — LIDOCAINE HYDROCHLORIDE 10 MG/ML
0.1 INJECTION, SOLUTION EPIDURAL; INFILTRATION; INTRACAUDAL; PERINEURAL ONCE
Status: DISCONTINUED | OUTPATIENT
Start: 2024-12-17 | End: 2024-12-18 | Stop reason: HOSPADM

## 2024-12-17 RX ORDER — DROPERIDOL 2.5 MG/ML
0.62 INJECTION, SOLUTION INTRAMUSCULAR; INTRAVENOUS ONCE AS NEEDED
Status: DISCONTINUED | OUTPATIENT
Start: 2024-12-17 | End: 2024-12-18 | Stop reason: HOSPADM

## 2024-12-17 RX ORDER — OXYCODONE HYDROCHLORIDE 5 MG/1
5 TABLET ORAL EVERY 4 HOURS PRN
Status: DISCONTINUED | OUTPATIENT
Start: 2024-12-17 | End: 2024-12-18 | Stop reason: HOSPADM

## 2024-12-17 RX ORDER — ACETAMINOPHEN 325 MG/1
650 TABLET ORAL EVERY 6 HOURS
Start: 2024-12-17

## 2024-12-17 RX ORDER — OXYCODONE HYDROCHLORIDE 5 MG/1
10 TABLET ORAL EVERY 4 HOURS PRN
Status: DISCONTINUED | OUTPATIENT
Start: 2024-12-17 | End: 2024-12-18 | Stop reason: HOSPADM

## 2024-12-17 RX ORDER — ONDANSETRON HYDROCHLORIDE 2 MG/ML
4 INJECTION, SOLUTION INTRAVENOUS ONCE AS NEEDED
Status: DISCONTINUED | OUTPATIENT
Start: 2024-12-17 | End: 2024-12-18 | Stop reason: HOSPADM

## 2024-12-17 ASSESSMENT — PAIN - FUNCTIONAL ASSESSMENT
PAIN_FUNCTIONAL_ASSESSMENT: 0-10
PAIN_FUNCTIONAL_ASSESSMENT: 0-10

## 2024-12-17 ASSESSMENT — PAIN SCALES - GENERAL
PAINLEVEL_OUTOF10: 0 - NO PAIN
PAINLEVEL_OUTOF10: 0 - NO PAIN

## 2024-12-17 NOTE — ANESTHESIA POSTPROCEDURE EVALUATION
Patient: Erick Becerril    Procedure Summary       Date: 12/17/24 Room / Location: [facility] OR    Anesthesia Start: 0950 Anesthesia Stop: 1026    Procedure: EGD Diagnosis:       S/P laparoscopic sleeve gastrectomy      Hiatal hernia      BMI 33.0-33.9,adult      Esophageal dysphagia    Scheduled Providers: Nacho Salmon MD; Ana Maria Valdez MD Responsible Provider: Ana Maria Valdez MD    Anesthesia Type: MAC ASA Status: 3            Anesthesia Type: MAC    Vitals Value Taken Time   /69 12/17/24 1038   Temp 36.3 °C (97.3 °F) 12/17/24 1023   Pulse 69 12/17/24 1038   Resp 16 12/17/24 1038   SpO2 98 % 12/17/24 1038       Anesthesia Post Evaluation    Patient location during evaluation: PACU  Patient participation: complete - patient participated  Level of consciousness: awake and alert  Pain management: adequate  Airway patency: patent  Cardiovascular status: acceptable  Respiratory status: acceptable  Hydration status: acceptable  Postoperative Nausea and Vomiting: none        There were no known notable events for this encounter.

## 2024-12-17 NOTE — ANESTHESIA PREPROCEDURE EVALUATION
Patient: Erick Becerril    Procedure Information       Date/Time: 12/17/24 0945    Scheduled providers: Nacho Salmon MD; Ana Maria Valdez MD    Procedure: EGD    Location: [facility] OR            Relevant Problems   Neuro   (+) Anxiety      GI   (+) Esophageal dysphagia   (+) Hiatal hernia      Endocrine   (+) Class 3 severe obesity due to excess calories with body mass index (BMI) of 45.0 to 49.9 in adult   (+) Morbid obesity due to excess calories (Multi)      Hematology   (+) Anemia      HEENT   (+) Seasonal allergies      ID   (+) Furuncle of umbilicus   (+) Hand, foot and mouth disease   (+) Wound infection       Clinical information reviewed:                   NPO Detail:  No data recorded     There were no vitals filed for this visit.    Past Surgical History:   Procedure Laterality Date    ANKLE ARTHROSCOPY W/ OPEN REPAIR Right 11/13/2023    right ankle arthroscopy with ligament repair    CHOLECYSTECTOMY      HIATAL HERNIA REPAIR  06/04/2024    Lap w/[place]leeve by Dr. Salmon [place] [facility] [place]    HYSTERECTOMY      SLEEVE GASTROPLASTY  06/04/2024    Lap plus HHR w/Dr. Salmon at [facility] [place]    TUBAL LIGATION      WISDOM TOOTH EXTRACTION       Past Medical History:   Diagnosis Date    Anxiety     Bariatric surgery status     S/P laparoscopic sleeve gastrectomy    Class 1 obesity with body mass index (BMI) of 33.0 to 33.9 in adult 12/06/2024    Class 3 severe obesity with body mass index (BMI) of 40.0 to 44.9 in adult 07/30/2024    42.32 kg/m²    Cystic fibrosis carrier     Encounter for pre-operative cardiovascular clearance 01/05/2024    Alexander Granado MD for Bariatric Procedure    Furuncle of umbilicus     Hiatal hernia with GERD and esophagitis     Insomnia     Morbid obesity with BMI of 45.0-49.9, adult (Multi) 05/24/2024    48.87 kg/m²    PCOS (polycystic ovarian syndrome)     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 (Patient taking differently: Take 2 tablets (650 mg) by mouth every 6 hours. 10/16/24  Pt verbalizes taking PRN [facility] LPN), Disp: , Rfl:     ALPRAZolam (Xanax) 0.5 mg tablet, take 1 to 2 tablets by mouth once daily if needed for anxiety, Disp: , Rfl:     cholecalciferol (Vitamin D3) 50 MCG (2000 UT) tablet, Take 1 tablet (2,000 Units) by mouth once daily., Disp: , Rfl:     FLUoxetine (PROzac) 20 mg capsule, Take 1 capsule (20 mg) by mouth once daily., Disp: , Rfl:     multivitamin-min-iron-FA-vit K 45 mg iron- 800 mcg-120 mcg capsule, Take 1 tablet by mouth 2 times a day., Disp: , Rfl:     UNABLE TO FIND, Take 1 tablet by mouth once daily. Med Name: Baritastic Calcium Supplement, Disp: , Rfl:     Current Facility-Administered Medications:     onabotulinumtoxinA (Botox) injection 100 Units, 100 Units, intramuscular, Once, Nacho Salmon MD  Prior to Admission medications    Medication Sig Start Date End Date Taking? Authorizing Provider   acetaminophen (Tylenol) 325 mg tablet Take 2 tablets (650 mg) by mouth every 6 hours.  Patient taking differently: Take 2 tablets (650 mg) by mouth every 6 hours. 10/16/24  Pt verbalizes taking PRN [facility] LPN 6/5/24   Nacho Salmon MD   ALPRAZolam (Xanax) 0.5 mg tablet take 1 to 2 tablets by mouth once daily if needed for anxiety 4/27/23   Historical Provider, MD   cholecalciferol (Vitamin D3) 50 MCG (2000 UT) tablet Take 1 tablet (2,000 Units) by mouth once daily. 8/8/14   Historical Provider, MD   FLUoxetine (PROzac) 20 mg capsule Take 1 capsule (20 mg) by mouth once daily. 12/12/23   Historical Provider, MD   multivitamin-min-iron-FA-vit K 45 mg iron- 800 mcg-120 mcg capsule Take 1 tablet by mouth 2 times a day.    Historical Provider, MD   UNABLE TO FIND Take 1 tablet by  mouth once daily. Med Name: Baritastic Calcium Supplement    Historical Provider, MD     Allergies   Allergen Reactions    Adhesive Rash     Social History     Tobacco Use    Smoking status: Never     Passive exposure: Never (10/16/24  Pt verifies verbally [facility] LPN)    Smokeless tobacco: Never    Tobacco comments:     12/3/24: patient verbalized. ERIBERTO RN    Substance Use Topics    Alcohol use: Not Currently     Comment: 12/3/24: patient verbalized. ERIBERTO RN         Chemistry    Lab Results   Component Value Date/Time     11/25/2024 2053    K 3.3 (L) 11/25/2024 2053     11/25/2024 2053    CO2 25 11/25/2024 2053    BUN 12 11/25/2024 2053    CREATININE 0.60 11/25/2024 2053    Lab Results   Component Value Date/Time    CALCIUM 9.3 11/25/2024 2053    ALKPHOS 68 11/25/2024 2053    AST 11 11/25/2024 2053    ALT 8 11/25/2024 2053    BILITOT 0.5 11/25/2024 2053          Lab Results   Component Value Date/Time    WBC 9.8 11/25/2024 2053    HGB 12.5 11/25/2024 2053    HCT 38.1 11/25/2024 2053     11/25/2024 2053     Lab Results   Component Value Date/Time    PROTIME 12.0 11/09/2023 0858    INR 1.1 11/09/2023 0858     Encounter Date: 11/25/24   ECG 12 lead   Result Value    Ventricular Rate 72    Atrial Rate 72    NJ Interval 130    QRS Duration 86    QT Interval 406    QTC Calculation(Bazett) 444    P Axis 23    R Axis 25    T Axis 1    QRS Count 12    Q Onset 221    P Onset 156    P Offset 204    T Offset 424    QTC Fredericia 431    Narrative    Normal sinus rhythm  Nonspecific T wave abnormality  Abnormal ECG  When compared with ECG of 05-JAN-2024 13:53,  T wave inversion now evident in Inferior leads  T wave inversion now evident in Anterior leads  See ED provider note for full interpretation and clinical correlation  Confirmed by Vandana Ugalde ([id number]) on 11/26/2024 4:20:14 PM     No results found for this or any previous visit from the past 1095 days.      PHYSICAL EXAM    Anesthesia Plan    History of general  anesthesia?: yes  History of complications of general anesthesia?: no    ASA 3     MAC     intravenous induction

## 2024-12-24 ENCOUNTER — HOSPITAL ENCOUNTER (OUTPATIENT)
Dept: RADIOLOGY | Facility: CLINIC | Age: 29
Discharge: HOME | End: 2024-12-24
Payer: COMMERCIAL

## 2024-12-24 ENCOUNTER — OFFICE VISIT (OUTPATIENT)
Dept: ORTHOPEDIC SURGERY | Facility: CLINIC | Age: 29
End: 2024-12-24
Payer: COMMERCIAL

## 2024-12-24 DIAGNOSIS — M25.871 ANKLE IMPINGEMENT SYNDROME, RIGHT: Primary | ICD-10-CM

## 2024-12-24 DIAGNOSIS — M25.562 CHRONIC PATELLOFEMORAL PAIN OF LEFT KNEE: ICD-10-CM

## 2024-12-24 DIAGNOSIS — M25.871 ANKLE IMPINGEMENT SYNDROME, RIGHT: ICD-10-CM

## 2024-12-24 DIAGNOSIS — G89.29 CHRONIC PATELLOFEMORAL PAIN OF LEFT KNEE: ICD-10-CM

## 2024-12-24 PROCEDURE — 99214 OFFICE O/P EST MOD 30 MIN: CPT | Performed by: STUDENT IN AN ORGANIZED HEALTH CARE EDUCATION/TRAINING PROGRAM

## 2024-12-24 PROCEDURE — 73610 X-RAY EXAM OF ANKLE: CPT | Mod: RT

## 2024-12-24 ASSESSMENT — PAIN - FUNCTIONAL ASSESSMENT: PAIN_FUNCTIONAL_ASSESSMENT: NO/DENIES PAIN

## 2024-12-24 NOTE — PROGRESS NOTES
ORTHOPAEDIC SURGERY OUTPATIENT PROGRESS NOTE    Chief Complaint:  Right ankle pain    History Of Present Illness  28-year-old female presents for evaluation of right ankle pain in follow-up from urgent care. Patient reports an inversion type injury to her ankle on 05/29/2023. Patient was attempting to hike and a misstep. She reports no prior history of trauma that she can recall. She experienced severe pain and had difficulty bearing weight. She was in the woods at the time hiking and had a friend assist her to her vehicle as she was unable to bear weight. She is currently reporting 7 out of 10 pain. She been taking ibuprofen. She was seen in urgent care setting where x-rays were obtained and the patient was placed into a walking boot and made nonweightbearing with crutches. She denies any associated numbness, tingling or weakness. Patient works as a  and was sent home from work today until further evaluation.     Patient is a current non-smoker, nondiabetic and does not use any anticoagulation.     Please see detailed patient history sheet for further information.     06/20/2023: Patient returns in follow-up of her right ankle injury. Patient reports that she has been essentially nonweightbearing from her prior appointment. She has not been comfortable putting weight down has been using crutches for toileting. She denies any new trauma or associated numbness, tingling weakness. She has noted that her swelling has decreased significantly. She has not yet returned to work.     07/03/2023: Patient returns for follow-up of her right ankle injury. Patient has transitioned to weightbearing in regular shoes and is currently wearing sandals with ankle brace. She reports returning to work with some residual, 3 out of 10 pain. She has not formally started physical therapy but has been doing HEP at home. Pain is worse when she has a misstep with some associated swelling. She has been using compression socks. She is  leaving Friday for vacation.     08/11/2023: Patient returns for follow-up of her right ankle injury. She has been weightbearing as tolerated in regular shoes and continuing to use her ankle brace. She is reporting continued pain in the ankle that worsens with activity and is rated a 7 out of 10 particular with turning her ankle in. She has tried physical therapy that appears to have helped some but still has pain. She has returned to work and is not reporting any finn instability at this time.     09/27/2023: Patient returns for follow-up of her right ankle injury. She is continued in physical therapy with the use of a brace. Unfortunately, she continues to complain of intermittent moderate to severe, typically 7 out of 10 pain when going downstairs. Pain is predominantly on the inside of her ankle. She does report subjective symptoms with ankle giving out or giving way.    10/26/2023: Patient returns for follow-up of her right ankle injury.  She has continued with severe ankle pain, typically 7-8 out of 10. Pain is also noted when going downstairs.  She has a clicking and catching.  She does note the ankle gives out or gives way.  She typically feels better in the brace.    11/22/2023: Patient returns s/p R ankle arthroscopy with extensive debridement, open loose body removal and medial/lateral ligament reconstruction from 11/13/2023.  Patient reports 4 out of 10 pain.  She really has had no numbness, tingling or weakness.  She reports that her ankle feels improved from prior to surgery.    12/04/2023: Patient returns as above s/p R ankle arthroscopy with extensive debridement, open loose body removal and medial/lateral ligament reconstruction from 11/13/2023.  Patient continues to report 4 out of 10 pain.  This is worse while at work when she is unable to elevate her extremity.  As per our telephone encounter, the patient got her cast wet and presents earlier than anticipated for cast exchange.  She denies any  interval trauma, she has been compliant with nonweightbearing restrictions.  She has continued on Lovenox for DVT prophylaxis.    12/20/2023: Patient returns as above s/p right ankle arthroscopy with extensive debridement, open loose body removal and medial/lateral ligament reconstruction from 11/13/2023.  Patient continues with a 4 out of 10 pain.  She has been compliant with nonweightbearing restrictions.  She has continued on Lovenox for DVT prophylaxis.    01/17/2024: Patient returns as above s/p right ankle arthroscopy with extensive debridement, open loose body removal and medial/lateral ligament reconstruction from 11/13/2023.  Patient is reporting 2 out of 10 pain.  She is steadily improving.  There has been a significant delay to initiating physical therapy due to scheduling delays with the holidays.  She is scheduled to start in approximately 2 weeks.  She has been out of the boot most of the time and wearing her lace up style ankle brace.  She has been working on ankle range of motion on her own.  She is quite happy with her progress so far.  She denies any new trauma or numbness, tingling and weakness.    02/13/2024: Patient returns as above for follow-up of her right ankle.  She reports minimal pain, she has been to 1 physical therapy appointment there has been some difficulty with scheduling.  Patient reports that the therapist was impressed with her overall progress to this point.  She has not had any reported instability events.  She continues to deny numbness, tingling or weakness in the RLE.    04/10/2024: Patient returns for follow-up of her right ankle as above.  She reports no pain.  She has returned to work without restrictions.  She has not noticed any clicking or catching of her ankle.  She is not reporting any instability but does notice occasional swelling at the end of the day which improves with ice.  She denies new trauma or associated numbness, tingling or weakness.    12/24/2024:  Patient returns for follow-up of her right ankle pain as above.  She is currently reporting no pain.  She has not had any mechanical symptoms or recurrence of instability.  She has had an intentional 90 pound weight loss and continues to recover well.  She has noticed increased and ongoing pain in her left knee.  She notices pain when getting up from a seated position as well as a clicking.    Past Medical History  Past Medical History:   Diagnosis Date    Anxiety     Bariatric surgery status     S/P laparoscopic sleeve gastrectomy    Class 1 obesity with body mass index (BMI) of 33.0 to 33.9 in adult 12/06/2024    Class 3 severe obesity with body mass index (BMI) of 40.0 to 44.9 in adult 07/30/2024    42.32 kg/m²    Cystic fibrosis carrier     Encounter for pre-operative cardiovascular clearance 01/05/2024    Alexander Granado MD for Bariatric Procedure    Furuncle of umbilicus     Hiatal hernia with GERD and esophagitis     Insomnia     Morbid obesity with BMI of 45.0-49.9, adult (Multi) 05/24/2024    48.87 kg/m²    PCOS (polycystic ovarian syndrome)     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 [facility] RN    Vaping Use    Vaping status: Never Used   Substance and Sexual Activity    Alcohol use: Not Currently     Comment: 12/3/24: patient verbalized. [facility] RN    Drug use: Yes     Types: Benzodiazepines     Comment: 12/3/24:  Erick verifies verbally has prescribed Benzo PRN ERIBERTO MOHAN     Sexual activity: Yes     Partners: Male     Birth control/protection: Female Sterilization, None     Comment: Hysterectomy     Social Drivers of Health     Financial Resource Strain: Low Risk  (6/5/2024)    Overall Financial Resource Strain (CARDIA)     Difficulty of Paying Living Expenses: Not hard at all   Transportation Needs: No Transportation Needs (6/5/2024)    PRAPARE - Transportation     Lack of Transportation (Medical): No     Lack of Transportation (Non-Medical): No   Housing Stability: Low Risk  (6/5/2024)    Housing Stability Vital Sign     Unable to Pay for Housing in the Last Year: No     Number of Places Lived in the Last Year: 1     Unstable Housing in the Last Year: No       Family History  Family History   Problem Relation Name Age of Onset    Skin cancer Mother      No Known Problems Father      Diabetes Paternal Grandfather Zeferino         Allergies  Allergies   Allergen Reactions    Adhesive Rash       Review of Systems  REVIEW OF SYSTEMS  Constitutional: no unplanned weight loss  Psychiatric: no suicidal ideation  ENT: no vision changes, no sinus problems  Pulmonary: no shortness of breath during office visit today  Lymphatic: no enlarged lymph nodes  Cardiovascular: no chest pain or shortness of breath during office visit today  Gastrointestinal: no stomach problems  Genitourinary: no dysuria   Skin: no rashes  Endocrine: no thyroid problems  Neurological: no headache, no numbness  Hematological: no easy bruising  Musculoskeletal: Right ankle pain     Physical Exam  PHYSICAL EXAMINATION  Constitutional Exam: well developed and well nourished  Psychiatric Exam: alert and oriented, appropriate mood and behavior  Eye Exam: EOMI  Pulmonary Exam: breathing non-labored, no apparent distress  Lymphatic exam: no appreciable lymphadenopathy in the lower extremities  Cardiovascular exam: RRR to peripheral palpation, DP pulses 2+, PT 2+, toes are pink with good capillary refill, no pitting edema  Skin exam:  no open lesions, rashes, abrasions or ulcerations  Neurological exam: sensation to light touch intact in both lower extremities in peripheral and dermatomal distributions (except for any abnormalities noted in musculoskeletal exam)    Musculoskeletal exam: Right lower extremity examination.  Patient has well-healed anteromedial, anterolateral as well as medial and lateral ankle incisions.  There is no ankle effusion.  Patient has pain-free and supple ankle range of motion without crepitus.  Subtalar and midtarsal joint range of motion remains supple and pain-free.  Patient typical knee pain localized posterior to the patella.  Nontender to palpation overlying the medial lateral joint line.  Patient has a positive patellar apprehension test.  Patient has knee range of motion from full extension to 110 degrees of flexion with pain at the extreme of flexion without obvious crepitus.  Patient has sensation intact light touch grossly to saphenous, sural, superficial peroneal, deep peroneal and tibial nerve distribution.  She has 5-5 strength in plantarflexion, dorsiflexion and EHL.  She has 2+ DP/PT pulse palpated.  Patient has a negative anterior drawer, negative talar tilt as well as negative dorsiflexion and external rotation stress.  There is no anteromedial or anterolateral management noted on examination.  Patient knee stability examination within normal limits, no varus or valgus laxity noted, negative posterior and anterior drawer, negative Lachman's.    Last Recorded Vitals  Last menstrual period 07/29/2022, not currently breastfeeding.    Laboratory Results  No results found for this or any previous visit (from the past 24 hours).     Radiology Results  X-ray imaging 3 view weightbearing right ankle reviewed and independently evaluated by me on 12/20/2024 demonstrates well aligned ankle mortise without fracture, dislocation.  There is no obvious joint space loss.  Incidental note of os  anisha.    Assessment/Plan:  29-year-old female s/p R ankle arthroscopy with extensive debridement, open loose body removal and medial/lateral ligament reconstruction from 11/13/2023 who continues to recover well.  I would recommend the patient continue weightbearing to her tolerance in her right lower extremity.  I have no formal restrictions for her.  I reviewed typical return ankle symptoms including increased mechanical symptoms such as catching, locking, giving out or giving way.  I reviewed with the patient anticipate her knee pain to be consistent likely with PFPS which may be amenable to physical therapy but given the duration of her symptoms I will provide her with a formal referral to Dr. Rick for evaluation.  I would plan to see the patient back on an as-needed basis.  Upon return, patient require three-view weightbearing right ankle if symptoms warrant.    Samuel Corcoran MD, ANNIA  Department of Orthopaedic Surgery  [facility]    The diagnosis and treatment plan were reviewed with the patient. All questions were answered. The patient verbalized understanding of the treatment plan. There were no barriers to understanding identified.    Note dictated with [employer] software.  Completed without full type editing and sent to avoid delay.

## 2025-01-07 ENCOUNTER — TELEPHONE (OUTPATIENT)
Dept: OBSTETRICS AND GYNECOLOGY | Facility: CLINIC | Age: 30
End: 2025-01-07
Payer: COMMERCIAL

## 2025-01-07 NOTE — TELEPHONE ENCOUNTER
Patient called stating she is experiencing discharge and odor says she noticed it about 2 weeks ago. Please advise

## 2025-01-09 ENCOUNTER — APPOINTMENT (OUTPATIENT)
Dept: ORTHOPEDIC SURGERY | Facility: CLINIC | Age: 30
End: 2025-01-09
Payer: COMMERCIAL

## 2025-01-15 ENCOUNTER — APPOINTMENT (OUTPATIENT)
Dept: OBSTETRICS AND GYNECOLOGY | Facility: CLINIC | Age: 30
End: 2025-01-15
Payer: COMMERCIAL

## 2025-01-15 VITALS
HEIGHT: 62 IN | BODY MASS INDEX: 32.39 KG/M2 | DIASTOLIC BLOOD PRESSURE: 64 MMHG | SYSTOLIC BLOOD PRESSURE: 120 MMHG | WEIGHT: 176 LBS

## 2025-01-15 DIAGNOSIS — N89.8 VAGINAL DISCHARGE: Primary | ICD-10-CM

## 2025-01-15 PROCEDURE — 1036F TOBACCO NON-USER: CPT | Performed by: NURSE PRACTITIONER

## 2025-01-15 PROCEDURE — 87205 SMEAR GRAM STAIN: CPT

## 2025-01-15 PROCEDURE — 3008F BODY MASS INDEX DOCD: CPT | Performed by: NURSE PRACTITIONER

## 2025-01-15 PROCEDURE — 99214 OFFICE O/P EST MOD 30 MIN: CPT | Performed by: NURSE PRACTITIONER

## 2025-01-15 RX ORDER — METRONIDAZOLE 500 MG/1
500 TABLET ORAL 2 TIMES DAILY
Qty: 14 TABLET | Refills: 0 | Status: SHIPPED | OUTPATIENT
Start: 2025-01-15 | End: 2025-01-22

## 2025-01-15 ASSESSMENT — ENCOUNTER SYMPTOMS
CONSTITUTIONAL NEGATIVE: 1
PSYCHIATRIC NEGATIVE: 1
GASTROINTESTINAL NEGATIVE: 1

## 2025-01-15 NOTE — PROGRESS NOTES
Subjective   Patient ID: Erick Becerril is a 29 y.o. female who presents for Infection (Patient complains of white discharge for the last two weeks with slight odor. /).  29 year old here for complaint of having vaginal discharge and odor.  She notes the discharge started about 2 weeks ago.  The discharge is white and has an odor off and on.  She has a history of having BV in the past and notes that metronidazole was effective.  She denies any new partners or need for std testing.  She denies any burning, itching, bleeding and urinary changes.      Female  Problem  The patient's primary symptoms include vaginal discharge. The patient's pertinent negatives include no genital odor. This is a recurrent problem. The current episode started 1 to 4 weeks ago. The problem occurs daily. The problem has been unchanged. The patient is experiencing no pain. The problem affects both sides. She is not pregnant. Nothing aggravates the symptoms. She is sexually active. No, her partner does not have an STD. She uses hysterectomy for contraception.       Review of Systems   Constitutional: Negative.    Gastrointestinal: Negative.    Genitourinary:  Positive for vaginal discharge.        Vaginal odor     Skin: Negative.    Psychiatric/Behavioral: Negative.         Objective   Physical Exam  Vitals reviewed.   Constitutional:       Appearance: Normal appearance. She is well-developed.   Pulmonary:      Effort: Pulmonary effort is normal. No respiratory distress.   Chest:   Breasts:     Breasts are symmetrical.      Right: Normal. No swelling, bleeding, inverted nipple, mass, nipple discharge, skin change or tenderness.      Left: Normal. No swelling, bleeding, inverted nipple, mass, nipple discharge, skin change or tenderness.   Abdominal:      Palpations: Abdomen is soft.   Genitourinary:     General: Normal vulva.      Exam position: Lithotomy position.      Pubic Area: No rash.       Labia:         Right: No rash, tenderness,  lesion or injury.         Left: No rash, tenderness, lesion or injury.       Urethra: No prolapse, urethral pain, urethral swelling or urethral lesion.      Vagina: Vaginal discharge present.      Uterus: Absent.       Adnexa: Right adnexa normal and left adnexa normal.      Rectum: Normal.   Musculoskeletal:         General: Normal range of motion.   Lymphadenopathy:      Upper Body:      Right upper body: No supraclavicular, axillary or pectoral adenopathy.      Left upper body: No supraclavicular, axillary or pectoral adenopathy.   Skin:     General: Skin is warm and dry.   Neurological:      General: No focal deficit present.      Mental Status: She is alert and oriented to person, place, and time. Mental status is at baseline.   Psychiatric:         Attention and Perception: Attention and perception normal.         Mood and Affect: Mood and affect normal.         Speech: Speech normal.         Behavior: Behavior normal. Behavior is cooperative.         Thought Content: Thought content normal.         Judgment: Judgment normal.         Assessment/Plan   Problem List Items Addressed This Visit    None  Visit Diagnoses         Codes    Vaginal discharge    -  Primary N89.8    Relevant Medications    metroNIDAZOLE (Flagyl) 500 mg tablet        Gram stain sent, metronidazole ordered  Follow up for annual exam, sooner as needed if problems arise         Mag Ahuja, JEANINE-CNP 01/15/25 11:43 AM

## 2025-01-16 LAB
CLUE CELLS VAG LPF-#/AREA: PRESENT /[LPF]
NUGENT SCORE: 8
YEAST VAG WET PREP-#/AREA: ABNORMAL

## 2025-01-24 ENCOUNTER — TELEPHONE (OUTPATIENT)
Dept: SURGERY | Facility: CLINIC | Age: 30
End: 2025-01-24
Payer: COMMERCIAL

## 2025-01-24 DIAGNOSIS — K21.9 GASTROESOPHAGEAL REFLUX DISEASE, UNSPECIFIED WHETHER ESOPHAGITIS PRESENT: ICD-10-CM

## 2025-01-24 DIAGNOSIS — Z98.84 S/P LAPAROSCOPIC SLEEVE GASTRECTOMY: ICD-10-CM

## 2025-01-24 DIAGNOSIS — B96.81 HELICOBACTER POSITIVE GASTRITIS: ICD-10-CM

## 2025-01-24 DIAGNOSIS — K29.70 HELICOBACTER POSITIVE GASTRITIS: ICD-10-CM

## 2025-01-24 RX ORDER — SUCRALFATE 1 G/1
1 TABLET ORAL
Qty: 120 TABLET | Refills: 11 | Status: SHIPPED | OUTPATIENT
Start: 2025-01-24 | End: 2026-01-24

## 2025-01-24 RX ORDER — OMEPRAZOLE 40 MG/1
40 CAPSULE, DELAYED RELEASE ORAL 2 TIMES DAILY
Qty: 30 CAPSULE | Refills: 1 | Status: SHIPPED | OUTPATIENT
Start: 2025-01-24 | End: 2025-02-23

## 2025-01-24 NOTE — TELEPHONE ENCOUNTER
Patient called RN about the EGD she had with botox done by Dr Salmon on 12/17/24. Patient stated that Dr Salmon said that if her symptoms did not improve that she is to reach out to the bariatric office. Patient stated that things have not improved and actually gotten worse. She stated that within the last week her acid reflux has been worse, and last night it woke her up out of her sleep due to it coming out of her nose. Patient stated that she does have acid reflux medication and she has been taking it. Patient is wondering what is next, based on Dr Salmon' note prior to maternity leave could possible need hiatal hernia repair. RN stated to patient that she will reach out to the doctor covering for Dr Salmon about next steps and get back to the patient. Patient verbalized understanding.

## 2025-01-29 ENCOUNTER — LAB REQUISITION (OUTPATIENT)
Dept: LAB | Facility: HOSPITAL | Age: 30
End: 2025-01-29
Payer: COMMERCIAL

## 2025-01-29 DIAGNOSIS — E66.813 OBESITY, CLASS 3: ICD-10-CM

## 2025-01-29 DIAGNOSIS — K44.9 DIAPHRAGMATIC HERNIA WITHOUT OBSTRUCTION OR GANGRENE: ICD-10-CM

## 2025-01-29 LAB
ABO GROUP (TYPE) IN BLOOD: NORMAL
ANTIBODY SCREEN: NORMAL
RH FACTOR (ANTIGEN D): NORMAL

## 2025-01-29 PROCEDURE — 86850 RBC ANTIBODY SCREEN: CPT

## 2025-01-29 PROCEDURE — 86900 BLOOD TYPING SEROLOGIC ABO: CPT

## 2025-01-29 PROCEDURE — 86900 BLOOD TYPING SEROLOGIC ABO: CPT | Mod: OUT | Performed by: SURGERY

## 2025-01-29 PROCEDURE — 86901 BLOOD TYPING SEROLOGIC RH(D): CPT

## 2025-01-30 LAB
25(OH)D3+25(OH)D2 SERPL-MCNC: 28 NG/ML (ref 30–100)
ALBUMIN SERPL-MCNC: 4.2 G/DL (ref 3.6–5.1)
ALP SERPL-CCNC: 56 U/L (ref 31–125)
ALT SERPL-CCNC: 5 U/L (ref 6–29)
ANION GAP SERPL CALCULATED.4IONS-SCNC: 7 MMOL/L (CALC) (ref 7–17)
AST SERPL-CCNC: 8 U/L (ref 10–30)
BASOPHILS # BLD AUTO: 27 CELLS/UL (ref 0–200)
BASOPHILS NFR BLD AUTO: 0.4 %
BILIRUB SERPL-MCNC: 0.7 MG/DL (ref 0.2–1.2)
BUN SERPL-MCNC: 13 MG/DL (ref 7–25)
CALCIUM SERPL-MCNC: 9.2 MG/DL (ref 8.6–10.2)
CHLORIDE SERPL-SCNC: 107 MMOL/L (ref 98–110)
CO2 SERPL-SCNC: 25 MMOL/L (ref 20–32)
CREAT SERPL-MCNC: 0.6 MG/DL (ref 0.5–0.97)
EGFRCR SERPLBLD CKD-EPI 2021: 123 ML/MIN/1.73M2
EOSINOPHIL # BLD AUTO: 121 CELLS/UL (ref 15–500)
EOSINOPHIL NFR BLD AUTO: 1.8 %
ERYTHROCYTE [DISTWIDTH] IN BLOOD BY AUTOMATED COUNT: 12.8 % (ref 11–15)
FERRITIN SERPL-MCNC: 72 NG/ML (ref 16–154)
FOLATE SERPL-MCNC: 6.6 NG/ML
GLUCOSE SERPL-MCNC: 87 MG/DL (ref 65–99)
HCT VFR BLD AUTO: 37.2 % (ref 35–45)
HGB BLD-MCNC: 11.9 G/DL (ref 11.7–15.5)
IRON SATN MFR SERPL: 24 % (CALC) (ref 16–45)
IRON SERPL-MCNC: 75 MCG/DL (ref 40–190)
LYMPHOCYTES # BLD AUTO: 1293 CELLS/UL (ref 850–3900)
LYMPHOCYTES NFR BLD AUTO: 19.3 %
MCH RBC QN AUTO: 28.3 PG (ref 27–33)
MCHC RBC AUTO-ENTMCNC: 32 G/DL (ref 32–36)
MCV RBC AUTO: 88.6 FL (ref 80–100)
MONOCYTES # BLD AUTO: 328 CELLS/UL (ref 200–950)
MONOCYTES NFR BLD AUTO: 4.9 %
NEUTROPHILS # BLD AUTO: 4931 CELLS/UL (ref 1500–7800)
NEUTROPHILS NFR BLD AUTO: 73.6 %
PLATELET # BLD AUTO: 266 THOUSAND/UL (ref 140–400)
PMV BLD REES-ECKER: 11 FL (ref 7.5–12.5)
POTASSIUM SERPL-SCNC: 4.4 MMOL/L (ref 3.5–5.3)
PROT SERPL-MCNC: 6.9 G/DL (ref 6.1–8.1)
PTH-INTACT SERPL-MCNC: 56 PG/ML (ref 16–77)
RBC # BLD AUTO: 4.2 MILLION/UL (ref 3.8–5.1)
SODIUM SERPL-SCNC: 139 MMOL/L (ref 135–146)
TIBC SERPL-MCNC: 314 MCG/DL (CALC) (ref 250–450)
VIT B1 BLD-SCNC: NORMAL NMOL/L
VIT B12 SERPL-MCNC: 280 PG/ML (ref 200–1100)
WBC # BLD AUTO: 6.7 THOUSAND/UL (ref 3.8–10.8)

## 2025-02-02 LAB
ALBUMIN SERPL-MCNC: 4.2 G/DL (ref 3.6–5.1)
ALP SERPL-CCNC: 57 U/L (ref 31–125)
ALT SERPL-CCNC: 7 U/L (ref 6–29)
ANION GAP SERPL CALCULATED.4IONS-SCNC: 8 MMOL/L (CALC) (ref 7–17)
AST SERPL-CCNC: 10 U/L (ref 10–30)
BILIRUB SERPL-MCNC: 0.7 MG/DL (ref 0.2–1.2)
BUN SERPL-MCNC: 14 MG/DL (ref 7–25)
CALCIUM SERPL-MCNC: 9.2 MG/DL (ref 8.6–10.2)
CHLORIDE SERPL-SCNC: 107 MMOL/L (ref 98–110)
CO2 SERPL-SCNC: 24 MMOL/L (ref 20–32)
COTININE SERPL-MCNC: <2 NG/ML
CREAT SERPL-MCNC: 0.6 MG/DL (ref 0.5–0.97)
EGFRCR SERPLBLD CKD-EPI 2021: 123 ML/MIN/1.73M2
ERYTHROCYTE [DISTWIDTH] IN BLOOD BY AUTOMATED COUNT: 12.8 % (ref 11–15)
GLUCOSE SERPL-MCNC: 85 MG/DL (ref 65–99)
HCT VFR BLD AUTO: 36.7 % (ref 35–45)
HGB BLD-MCNC: 11.8 G/DL (ref 11.7–15.5)
INR PPP: 1
MCH RBC QN AUTO: 28.2 PG (ref 27–33)
MCHC RBC AUTO-ENTMCNC: 32.2 G/DL (ref 32–36)
MCV RBC AUTO: 87.8 FL (ref 80–100)
NICOTINE SERPL-MCNC: <2 NG/ML
PLATELET # BLD AUTO: 265 THOUSAND/UL (ref 140–400)
PMV BLD REES-ECKER: 11.1 FL (ref 7.5–12.5)
POTASSIUM SERPL-SCNC: 4.5 MMOL/L (ref 3.5–5.3)
PROT SERPL-MCNC: 6.9 G/DL (ref 6.1–8.1)
PROTHROMBIN TIME: 11 SEC (ref 9–11.5)
RBC # BLD AUTO: 4.18 MILLION/UL (ref 3.8–5.1)
SODIUM SERPL-SCNC: 139 MMOL/L (ref 135–146)
WBC # BLD AUTO: 6.5 THOUSAND/UL (ref 3.8–10.8)

## 2025-02-03 ENCOUNTER — APPOINTMENT (OUTPATIENT)
Dept: ORTHOPEDIC SURGERY | Facility: CLINIC | Age: 30
End: 2025-02-03
Payer: COMMERCIAL

## 2025-02-03 ENCOUNTER — HOSPITAL ENCOUNTER (OUTPATIENT)
Dept: RADIOLOGY | Facility: CLINIC | Age: 30
Discharge: HOME | End: 2025-02-03
Payer: COMMERCIAL

## 2025-02-03 VITALS — HEIGHT: 62 IN | WEIGHT: 170 LBS | BODY MASS INDEX: 31.28 KG/M2

## 2025-02-03 DIAGNOSIS — M25.562 CHRONIC PATELLOFEMORAL PAIN OF LEFT KNEE: ICD-10-CM

## 2025-02-03 DIAGNOSIS — M25.562 LEFT KNEE PAIN, UNSPECIFIED CHRONICITY: ICD-10-CM

## 2025-02-03 DIAGNOSIS — G89.29 CHRONIC PATELLOFEMORAL PAIN OF LEFT KNEE: ICD-10-CM

## 2025-02-03 LAB
25(OH)D3+25(OH)D2 SERPL-MCNC: 28 NG/ML (ref 30–100)
ALBUMIN SERPL-MCNC: 4.2 G/DL (ref 3.6–5.1)
ALP SERPL-CCNC: 56 U/L (ref 31–125)
ALT SERPL-CCNC: 5 U/L (ref 6–29)
ANION GAP SERPL CALCULATED.4IONS-SCNC: 7 MMOL/L (CALC) (ref 7–17)
AST SERPL-CCNC: 8 U/L (ref 10–30)
BASOPHILS # BLD AUTO: 27 CELLS/UL (ref 0–200)
BASOPHILS NFR BLD AUTO: 0.4 %
BILIRUB SERPL-MCNC: 0.7 MG/DL (ref 0.2–1.2)
BUN SERPL-MCNC: 13 MG/DL (ref 7–25)
CALCIUM SERPL-MCNC: 9.2 MG/DL (ref 8.6–10.2)
CHLORIDE SERPL-SCNC: 107 MMOL/L (ref 98–110)
CO2 SERPL-SCNC: 25 MMOL/L (ref 20–32)
CREAT SERPL-MCNC: 0.6 MG/DL (ref 0.5–0.97)
EGFRCR SERPLBLD CKD-EPI 2021: 123 ML/MIN/1.73M2
EOSINOPHIL # BLD AUTO: 121 CELLS/UL (ref 15–500)
EOSINOPHIL NFR BLD AUTO: 1.8 %
ERYTHROCYTE [DISTWIDTH] IN BLOOD BY AUTOMATED COUNT: 12.8 % (ref 11–15)
FERRITIN SERPL-MCNC: 72 NG/ML (ref 16–154)
FOLATE SERPL-MCNC: 6.6 NG/ML
GLUCOSE SERPL-MCNC: 87 MG/DL (ref 65–99)
HCT VFR BLD AUTO: 37.2 % (ref 35–45)
HGB BLD-MCNC: 11.9 G/DL (ref 11.7–15.5)
IRON SATN MFR SERPL: 24 % (CALC) (ref 16–45)
IRON SERPL-MCNC: 75 MCG/DL (ref 40–190)
LYMPHOCYTES # BLD AUTO: 1293 CELLS/UL (ref 850–3900)
LYMPHOCYTES NFR BLD AUTO: 19.3 %
MCH RBC QN AUTO: 28.3 PG (ref 27–33)
MCHC RBC AUTO-ENTMCNC: 32 G/DL (ref 32–36)
MCV RBC AUTO: 88.6 FL (ref 80–100)
MONOCYTES # BLD AUTO: 328 CELLS/UL (ref 200–950)
MONOCYTES NFR BLD AUTO: 4.9 %
NEUTROPHILS # BLD AUTO: 4931 CELLS/UL (ref 1500–7800)
NEUTROPHILS NFR BLD AUTO: 73.6 %
PLATELET # BLD AUTO: 266 THOUSAND/UL (ref 140–400)
PMV BLD REES-ECKER: 11 FL (ref 7.5–12.5)
POTASSIUM SERPL-SCNC: 4.4 MMOL/L (ref 3.5–5.3)
PROT SERPL-MCNC: 6.9 G/DL (ref 6.1–8.1)
PTH-INTACT SERPL-MCNC: 56 PG/ML (ref 16–77)
RBC # BLD AUTO: 4.2 MILLION/UL (ref 3.8–5.1)
SODIUM SERPL-SCNC: 139 MMOL/L (ref 135–146)
TIBC SERPL-MCNC: 314 MCG/DL (CALC) (ref 250–450)
VIT B1 BLD-SCNC: 77 NMOL/L (ref 78–185)
VIT B12 SERPL-MCNC: 280 PG/ML (ref 200–1100)
WBC # BLD AUTO: 6.7 THOUSAND/UL (ref 3.8–10.8)

## 2025-02-03 PROCEDURE — 99214 OFFICE O/P EST MOD 30 MIN: CPT | Performed by: STUDENT IN AN ORGANIZED HEALTH CARE EDUCATION/TRAINING PROGRAM

## 2025-02-03 PROCEDURE — 73564 X-RAY EXAM KNEE 4 OR MORE: CPT | Mod: LT

## 2025-02-03 PROCEDURE — 1036F TOBACCO NON-USER: CPT | Performed by: STUDENT IN AN ORGANIZED HEALTH CARE EDUCATION/TRAINING PROGRAM

## 2025-02-03 PROCEDURE — 3008F BODY MASS INDEX DOCD: CPT | Performed by: STUDENT IN AN ORGANIZED HEALTH CARE EDUCATION/TRAINING PROGRAM

## 2025-02-03 PROCEDURE — 73564 X-RAY EXAM KNEE 4 OR MORE: CPT | Mod: LEFT SIDE | Performed by: RADIOLOGY

## 2025-02-03 ASSESSMENT — PAIN - FUNCTIONAL ASSESSMENT: PAIN_FUNCTIONAL_ASSESSMENT: NO/DENIES PAIN
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
## 2025-02-14 ENCOUNTER — HOSPITAL ENCOUNTER (EMERGENCY)
Facility: HOSPITAL | Age: 30
Discharge: HOME | End: 2025-02-15
Payer: COMMERCIAL

## 2025-02-14 ENCOUNTER — APPOINTMENT (OUTPATIENT)
Dept: RADIOLOGY | Facility: HOSPITAL | Age: 30
End: 2025-02-14
Payer: COMMERCIAL

## 2025-02-14 ENCOUNTER — APPOINTMENT (OUTPATIENT)
Dept: CARDIOLOGY | Facility: HOSPITAL | Age: 30
End: 2025-02-14
Payer: COMMERCIAL

## 2025-02-14 DIAGNOSIS — R42 LIGHTHEADEDNESS: ICD-10-CM

## 2025-02-14 DIAGNOSIS — R82.71 ASYMPTOMATIC BACTERIURIA: Primary | ICD-10-CM

## 2025-02-14 LAB
ALBUMIN SERPL BCP-MCNC: 4 G/DL (ref 3.4–5)
ALP SERPL-CCNC: 53 U/L (ref 33–110)
ALT SERPL W P-5'-P-CCNC: 8 U/L (ref 7–45)
ANION GAP SERPL CALC-SCNC: 15 MMOL/L (ref 10–20)
APPEARANCE UR: ABNORMAL
AST SERPL W P-5'-P-CCNC: 9 U/L (ref 9–39)
BACTERIA #/AREA URNS AUTO: ABNORMAL /HPF
BASOPHILS # BLD AUTO: 0.04 X10*3/UL (ref 0–0.1)
BASOPHILS NFR BLD AUTO: 0.4 %
BILIRUB SERPL-MCNC: 0.6 MG/DL (ref 0–1.2)
BILIRUB UR STRIP.AUTO-MCNC: ABNORMAL MG/DL
BUN SERPL-MCNC: 11 MG/DL (ref 6–23)
CALCIUM SERPL-MCNC: 9.4 MG/DL (ref 8.6–10.3)
CARDIAC TROPONIN I PNL SERPL HS: <3 NG/L (ref 0–13)
CHLORIDE SERPL-SCNC: 104 MMOL/L (ref 98–107)
CO2 SERPL-SCNC: 25 MMOL/L (ref 21–32)
COLOR UR: YELLOW
CREAT SERPL-MCNC: 0.63 MG/DL (ref 0.5–1.05)
EGFRCR SERPLBLD CKD-EPI 2021: >90 ML/MIN/1.73M*2
EOSINOPHIL # BLD AUTO: 0.12 X10*3/UL (ref 0–0.7)
EOSINOPHIL NFR BLD AUTO: 1.3 %
ERYTHROCYTE [DISTWIDTH] IN BLOOD BY AUTOMATED COUNT: 13.2 % (ref 11.5–14.5)
FLUAV RNA RESP QL NAA+PROBE: NOT DETECTED
FLUBV RNA RESP QL NAA+PROBE: NOT DETECTED
GLUCOSE SERPL-MCNC: 99 MG/DL (ref 74–99)
GLUCOSE UR STRIP.AUTO-MCNC: NORMAL MG/DL
HCT VFR BLD AUTO: 35.2 % (ref 36–46)
HGB BLD-MCNC: 11.7 G/DL (ref 12–16)
IMM GRANULOCYTES # BLD AUTO: 0.04 X10*3/UL (ref 0–0.7)
IMM GRANULOCYTES NFR BLD AUTO: 0.4 % (ref 0–0.9)
INR PPP: 1.2 (ref 0.9–1.1)
KETONES UR STRIP.AUTO-MCNC: ABNORMAL MG/DL
LEUKOCYTE ESTERASE UR QL STRIP.AUTO: NEGATIVE
LYMPHOCYTES # BLD AUTO: 2.15 X10*3/UL (ref 1.2–4.8)
LYMPHOCYTES NFR BLD AUTO: 23.4 %
MAGNESIUM SERPL-MCNC: 1.84 MG/DL (ref 1.6–2.4)
MCH RBC QN AUTO: 28.7 PG (ref 26–34)
MCHC RBC AUTO-ENTMCNC: 33.2 G/DL (ref 32–36)
MCV RBC AUTO: 87 FL (ref 80–100)
MONOCYTES # BLD AUTO: 0.49 X10*3/UL (ref 0.1–1)
MONOCYTES NFR BLD AUTO: 5.3 %
MUCOUS THREADS #/AREA URNS AUTO: ABNORMAL /LPF
NEUTROPHILS # BLD AUTO: 6.36 X10*3/UL (ref 1.2–7.7)
NEUTROPHILS NFR BLD AUTO: 69.2 %
NITRITE UR QL STRIP.AUTO: NEGATIVE
NRBC BLD-RTO: 0 /100 WBCS (ref 0–0)
PH UR STRIP.AUTO: 5.5 [PH]
PLATELET # BLD AUTO: 276 X10*3/UL (ref 150–450)
POTASSIUM SERPL-SCNC: 3.5 MMOL/L (ref 3.5–5.3)
PROT SERPL-MCNC: 7 G/DL (ref 6.4–8.2)
PROT UR STRIP.AUTO-MCNC: ABNORMAL MG/DL
PROTHROMBIN TIME: 13.6 SECONDS (ref 9.8–12.8)
RBC # BLD AUTO: 4.07 X10*6/UL (ref 4–5.2)
RBC # UR STRIP.AUTO: NEGATIVE MG/DL
RBC #/AREA URNS AUTO: ABNORMAL /HPF
SARS-COV-2 RNA RESP QL NAA+PROBE: NOT DETECTED
SODIUM SERPL-SCNC: 140 MMOL/L (ref 136–145)
SP GR UR STRIP.AUTO: 1.04
SQUAMOUS #/AREA URNS AUTO: ABNORMAL /HPF
UROBILINOGEN UR STRIP.AUTO-MCNC: ABNORMAL MG/DL
WBC # BLD AUTO: 9.2 X10*3/UL (ref 4.4–11.3)
WBC #/AREA URNS AUTO: ABNORMAL /HPF

## 2025-02-14 PROCEDURE — 99285 EMERGENCY DEPT VISIT HI MDM: CPT | Mod: 25

## 2025-02-14 PROCEDURE — 96360 HYDRATION IV INFUSION INIT: CPT

## 2025-02-14 PROCEDURE — 87636 SARSCOV2 & INF A&B AMP PRB: CPT | Performed by: HEALTH CARE PROVIDER

## 2025-02-14 PROCEDURE — 85610 PROTHROMBIN TIME: CPT | Performed by: HEALTH CARE PROVIDER

## 2025-02-14 PROCEDURE — 85025 COMPLETE CBC W/AUTO DIFF WBC: CPT | Performed by: HEALTH CARE PROVIDER

## 2025-02-14 PROCEDURE — 36415 COLL VENOUS BLD VENIPUNCTURE: CPT | Performed by: HEALTH CARE PROVIDER

## 2025-02-14 PROCEDURE — 81003 URINALYSIS AUTO W/O SCOPE: CPT | Performed by: HEALTH CARE PROVIDER

## 2025-02-14 PROCEDURE — 83735 ASSAY OF MAGNESIUM: CPT | Performed by: HEALTH CARE PROVIDER

## 2025-02-14 PROCEDURE — 71045 X-RAY EXAM CHEST 1 VIEW: CPT

## 2025-02-14 PROCEDURE — 71045 X-RAY EXAM CHEST 1 VIEW: CPT | Performed by: RADIOLOGY

## 2025-02-14 PROCEDURE — 80053 COMPREHEN METABOLIC PANEL: CPT | Performed by: HEALTH CARE PROVIDER

## 2025-02-14 PROCEDURE — 93005 ELECTROCARDIOGRAM TRACING: CPT

## 2025-02-14 PROCEDURE — 2500000004 HC RX 250 GENERAL PHARMACY W/ HCPCS (ALT 636 FOR OP/ED): Performed by: HEALTH CARE PROVIDER

## 2025-02-14 PROCEDURE — 84484 ASSAY OF TROPONIN QUANT: CPT | Performed by: HEALTH CARE PROVIDER

## 2025-02-14 RX ADMIN — SODIUM CHLORIDE, POTASSIUM CHLORIDE, SODIUM LACTATE AND CALCIUM CHLORIDE 1000 ML: 600; 310; 30; 20 INJECTION, SOLUTION INTRAVENOUS at 22:50

## 2025-02-14 ASSESSMENT — PAIN DESCRIPTION - PAIN TYPE: TYPE: ACUTE PAIN

## 2025-02-14 ASSESSMENT — PAIN SCALES - GENERAL
PAINLEVEL_OUTOF10: 0 - NO PAIN
PAINLEVEL_OUTOF10: 3

## 2025-02-14 ASSESSMENT — PAIN DESCRIPTION - LOCATION: LOCATION: ABDOMEN

## 2025-02-14 ASSESSMENT — PAIN - FUNCTIONAL ASSESSMENT: PAIN_FUNCTIONAL_ASSESSMENT: 0-10

## 2025-02-14 ASSESSMENT — PAIN DESCRIPTION - ORIENTATION: ORIENTATION: MID;UPPER

## 2025-02-14 NOTE — Clinical Note
Erick Becerril was seen and treated in our emergency department on 2/14/2025.  She may return to work on 02/17/2025.       If you have any questions or concerns, please don't hesitate to call.      Mesfin Turk PA-C

## 2025-02-15 VITALS
OXYGEN SATURATION: 98 % | BODY MASS INDEX: 30.36 KG/M2 | WEIGHT: 165 LBS | SYSTOLIC BLOOD PRESSURE: 110 MMHG | HEIGHT: 62 IN | TEMPERATURE: 97.9 F | DIASTOLIC BLOOD PRESSURE: 83 MMHG | HEART RATE: 74 BPM | RESPIRATION RATE: 17 BRPM

## 2025-02-15 LAB — HOLD SPECIMEN: NORMAL

## 2025-02-15 NOTE — ED PROVIDER NOTES
HPI   Chief Complaint   Patient presents with    Weakness, Gen     Pt weak, dizzy, and left hand is going numb intermittently       CC: Shaking, lightheaded  HPI:   29-year-old female presents ED complaining of acute onset of feeling shakiness, chills, lightheaded, patient also reported having an episode where she stated that her left hand looked bluish but only lasted a couple seconds she has never had this episode before and she denied any associated pain when this happened she has no history of Raynaud's and she states that she was not leaning on top of it, she denies having any chest pain, shortness of breath, denies any nausea vomiting or diarrhea denies any fevers, she denies having any abdominal pain, she does have a history anxiety and gastric sleeve.    Additional Limitations to History:   External Records Reviewed: I reviewed recent and relevant outside records including   History Obtained From:     Past Medical History: Per HPI  Medications: Reviewed in EMR and with patient  Allergies:  Reviewed in EMR  Past Surgical History:   Social History:     ------------------------------------------------------------------------------------------------------  Physical Exam:  --Vital signs reviewed in nursing triage note, EMR flow sheets, and at patient's bedside  GEN:  A&Ox3, no acute distress, appears comfortable.  Conversational and appropriate.  No confusion or gross mental status changes.  EYES: EOMI, non-injected sclera.  ENT: Moist mucous membranes, no apparent injuries or lesions.   CARDIO: Normal rate and regular rhythm. No murmurs, rubs, or gallops.  2+ equal pulses of the distal extremities.   PULM: Clear to auscultation bilaterally. No rales, rhonchi, or wheezes. Good symmetric chest expansion.  GI: Soft, non-tender, non-distended. No rebound tenderness or guarding.  SKIN: Warm and dry, no rashes or lesions.  MSK: ROM intact the extremities without contractures.   EXT: No peripheral edema, contusions, or  wounds.   NEURO: Cranial nerves II-XII grossly intact. Sensation to light touch intact and equal bilaterally in upper and lower extremities.  Symmetric 5/5 strength in upper and lower extremities.  PSYCH: Appropriate mood and behavior, converses and responds appropriately during exam.  -------------------------------------------------------------------------------------------------------      Differential Diagnoses Considered:   Chronic Medical Conditions Significantly Affecting Care:   Diagnostic testing considered: [PERC, D-Dimer, PECARN, etc.]    - EKG interpreted by myself normal sinus rhythm ventricular rate 73 KS interval 132 normal QRS duration no prolonged QT/QTc no obvious ST elevation, depression or acute ischemic findings normal axis  - I independently interpreted: [CXR, CT, POCUS, etc. including your interpretation]  - Labs notable for     Escalation of Care: Appropriate for   Social Determinants of Health Significantly Affecting Care: [Homelessness, lacking transportation, uninsured, unable to afford medications]  Prescription Drug Consideration: [Antibiotics, antivirals, pain medications, etc.]  Discussion of Management with Other Providers:  I discussed the patient/results with: [admitting team, consultant, radiologist, social work, EPAT, case management, PT/OT, RT, PCP, etc.]      Mesfin Turk PA-C              Patient History   Past Medical History:   Diagnosis Date    Anxiety     Bariatric surgery status     S/P laparoscopic sleeve gastrectomy    Class 1 obesity with body mass index (BMI) of 33.0 to 33.9 in adult 12/06/2024    Class 3 severe obesity with body mass index (BMI) of 40.0 to 44.9 in adult 07/30/2024    42.32 kg/m²    Cystic fibrosis carrier     Encounter for pre-operative cardiovascular clearance 01/05/2024    Alexander Granado MD for Bariatric Procedure    Furuncle of umbilicus     Hiatal hernia with GERD and esophagitis     Insomnia     Morbid obesity with BMI of 45.0-49.9, adult  (Multi) 05/24/2024    48.87 kg/m²    PCOS (polycystic ovarian syndrome)     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 [facility]    [place]STERECTOMY      SLEEVE GASTROPLASTY  06/04/2024    [place]ap plus [place]HR w[place] Sal[place] [facility]    TUBAL LIGATION      WISDOM TOOTH EXTRACTION       Family History   Problem Relation Name Age of Onset    Skin cancer Mother      No Known Problems Father      Diabetes Paternal Grandfather Zeferino      Social History     Tobacco Use    Smoking status: Never     Passive exposure: Never (10/16/24  Pt verifies verbally [facility] LPN)    Smokeless tobacco: Never    Tobacco comments:     12/3/24: patient verbalized. MC RN    Vaping Use    Vaping status: Never Used   Substance Use Topics    Alcohol use: Not Currently     Comment: 12/3/24: patient verbalized. MC RN    Drug use: Yes     Types: Benzodiazepines     Comment: 12/3/24:  Erick tonny verbally has prescribed Benzo PRN MC RN       Physical Exam   ED Triage Vitals [02/14/25 2222]   Temperature Heart Rate Respirations BP   36.6 °C (97.9 °F) 87 20 131/74      Pulse Ox Temp Source Heart Rate Source Patient Position   100 % Temporal Monitor Lying      BP Location FiO2 (%)     Right arm --       Physical Exam      ED Course & MDM                  No data recorded     Marion Coma Scale Score: 15 (02/14/25 2254 : Cathryn Albright, MARQUES)                           Medical Decision Making      Procedure  Procedures     Mesfin Turk PA-C  02/14/25 3484     this time she does appear to have some asymptomatic bacteriuria pending urine culture however her remaining laboratory workup appears unremarkable she was given some IV fluids and she remains hemodynamically stable nontoxic-appearing, afebrile, in no acute distress, patient understands instructions to return to ED if she continues to have any dizziness lightheadedness she develops any fevers, chest pain, shortness of breath or abdominal pain.        Procedure  Procedures     Mesfin Turk PA-C  02/14/25 2347       Mesfin Turk PA-C  02/17/25 0896

## 2025-02-17 LAB
ATRIAL RATE: 73 BPM
P AXIS: 56 DEGREES
P OFFSET: 200 MS
P ONSET: 153 MS
PR INTERVAL: 132 MS
Q ONSET: 219 MS
QRS COUNT: 12 BEATS
QRS DURATION: 90 MS
QT INTERVAL: 402 MS
QTC CALCULATION(BAZETT): 442 MS
QTC FREDERICIA: 429 MS
R AXIS: 23 DEGREES
T AXIS: 19 DEGREES
T OFFSET: 420 MS
VENTRICULAR RATE: 73 BPM

## 2025-03-05 ENCOUNTER — TELEPHONE (OUTPATIENT)
Dept: SURGERY | Facility: CLINIC | Age: 30
End: 2025-03-05
Payer: COMMERCIAL

## 2025-03-05 NOTE — TELEPHONE ENCOUNTER
This RN attempted to call patient to gather information in regards to appointment with Dr. Salmon on 3/14/25 at 9:15 am at [facility] (inside [facility], main floor in the Specialty Clinic).   Parking is available at a cost of $5.00 at the Main Entrance.  We look forward to seeing you!  Maria Elena Bates RN, Clinic Nurse

## 2025-03-14 ENCOUNTER — APPOINTMENT (OUTPATIENT)
Dept: SURGERY | Facility: CLINIC | Age: 30
End: 2025-03-14
Payer: COMMERCIAL

## 2025-03-14 VITALS
WEIGHT: 162.1 LBS | OXYGEN SATURATION: 100 % | SYSTOLIC BLOOD PRESSURE: 105 MMHG | HEIGHT: 62 IN | DIASTOLIC BLOOD PRESSURE: 70 MMHG | BODY MASS INDEX: 29.83 KG/M2 | HEART RATE: 79 BPM

## 2025-03-14 DIAGNOSIS — Z90.3 S/P GASTRIC SLEEVE PROCEDURE: ICD-10-CM

## 2025-03-14 DIAGNOSIS — K21.9 GASTROESOPHAGEAL REFLUX DISEASE, UNSPECIFIED WHETHER ESOPHAGITIS PRESENT: Primary | ICD-10-CM

## 2025-03-14 DIAGNOSIS — R13.19 ESOPHAGEAL DYSPHAGIA: ICD-10-CM

## 2025-03-14 DIAGNOSIS — Z90.3 INTESTINAL MALABSORPTION FOLLOWING GASTRECTOMY (HHS-HCC): ICD-10-CM

## 2025-03-14 DIAGNOSIS — K91.2 INTESTINAL MALABSORPTION FOLLOWING GASTRECTOMY (HHS-HCC): ICD-10-CM

## 2025-03-14 PROCEDURE — 99215 OFFICE O/P EST HI 40 MIN: CPT | Performed by: SURGERY

## 2025-03-14 PROCEDURE — 3008F BODY MASS INDEX DOCD: CPT | Performed by: SURGERY

## 2025-03-14 PROCEDURE — 1036F TOBACCO NON-USER: CPT | Performed by: SURGERY

## 2025-03-14 NOTE — H&P (VIEW-ONLY)
BARIATRIC SURGERY CLINIC  FOLLOW UP NOTE      Name: Erick Becerril  MRN: [id number]      Index Surgery  Date of Surgery: 6/4/24   Surgeon: Nacho Salmon MD  Surgical Procedure: Laparoscopic sleeve gastrectomy  37547 and Hiatal hernia repair 84724  Initial weight: 261 lbs  Pre-surgical weight: 267 lbs        Other Bariatric Surgeries  none    Visit: 9    months  Today's Visit:   Wt Readings from Last 1 Encounters:   03/14/25 73.5 kg (162 lb 1.6 oz)    Body mass index is 29.65 kg/m².   Last Visit:    Wt Readings from Last 3 Encounters:   03/14/25 73.5 kg (162 lb 1.6 oz)   02/14/25 74.8 kg (165 lb)   02/03/25 77.1 kg (170 lb)       Total weight loss: 95 lbs    HPI: new onset severe acid reflux, sometimes waking her from sleep and coming out her nose began a couple months ago. She is now requiring omeprazole 40 mg bid and carafate prn for control.     She did have egd with botox of the pylorus in December for high pressure sleeve - this helped her chest pressure/discomfort and dysphagia a little, but she still struggles with significant dysphagia.  She is very compliant with bariatric diet and exercise and vitamin regimen.     DIET INTAKE: Regular food        DAILY SUPPLEMENTS:  Calcium: Calcium Citrate w/ vitamin D (1200 - 1500mg)  Multivitamin & Minerals: 2 per day - bariatric fusion combo vitamin with iron  Iron Supplement: included in multi-vitamin  Vitamin B12: included in multi-vitamin  Vitamin D3: included in multi-vitamin  Other: none  PPI: omeprazole 40 mg bid and carafate prn    EXERCISE: walking 3-4 days a week and some gym           Current Outpatient Medications   Medication Sig Dispense Refill    acetaminophen (Tylenol) 325 mg tablet Take 2 tablets (650 mg) by mouth every 6 hours. 10/16/24  Pt verbalizes taking PRN [facility] LPN      ALPRAZolam (Xanax) 0.5 mg tablet take 1 to 2 tablets by mouth once daily if needed for anxiety      cholecalciferol (Vitamin D3) 50 MCG (2000 UT) tablet Take 1 tablet (2,000 Units) by  "mouth once daily.      FLUoxetine (PROzac) 20 mg capsule Take 1 capsule (20 mg) by mouth once daily.      multivitamin-min-iron-FA-vit K 45 mg iron- 800 mcg-120 mcg capsule Take 1 tablet by mouth 2 times a day.      omeprazole (PriLOSEC) 40 mg DR capsule Take 1 capsule (40 mg) by mouth 2 times a day. Do not crush or chew. 30 capsule 1    UNABLE TO FIND Take 1 tablet by mouth once daily. Med Name: Baritastic Calcium Supplement      sucralfate (Carafate) 1 gram tablet Take 1 tablet (1 g) by mouth 4 times a day before meals. (Patient not taking: Reported on 3/14/2025) 120 tablet 11     No current facility-administered medications for this visit.       Comorbidities:  No problem-specific Assessment & Plan notes found for this encounter.        REVIEW OF SYSTEMS:  CONSTITUTIONAL: Patient denies fevers, chills, sweats and weight changes.  EYES: Patient denies any visual symptoms.  EARS, NOSE, AND THROAT: No difficulties with hearing. No symptoms of rhinitis or sore throat.  CARDIOVASCULAR: Patient denies chest pains, palpitations, orthopnea and paroxysmal nocturnal dyspnea.  RESPIRATORY: No dyspnea on exertion, no wheezing or cough.  GI: severe gerd and dysphagia  : No urinary hesitancy or dribbling. No nocturia or urinary frequency. No abnormal urethral discharge.  MUSCULOSKELETAL: No myalgias or arthralgias.  NEUROLOGIC: No chronic headaches, no seizures. Patient denies numbness, tingling or weakness.  PSYCHIATRIC: Patient denies problems with mood disturbance. No problems with anxiety.  ENDOCRINE: No excessive urination or excessive thirst.  DERMATOLOGIC: Patient denies any rashes or skin changes.    PHYSICAL EXAM:  /70 (BP Location: Right arm, Patient Position: Sitting, BP Cuff Size: Adult)   Pulse 79   Ht 1.575 m (5' 2\")   Wt 73.5 kg (162 lb 1.6 oz)   LMP 07/29/2022   SpO2 100%   BMI 29.65 kg/m²   GENERAL: No apparent distress. Pt is alert and oriented x3.  HEENT: Head is normocephalic and atraumatic. " Extraocular muscles are intact. Pupils are equal, round, and reactive to light and accommodation. Nares appeared normal. Mouth is well hydrated and without lesions. Mucous membranes are moist. Posterior pharynx clear of any exudate or lesions.  NECK: Supple. No carotid bruits. No lymphadenopathy or thyromegaly.  LUNGS: Clear to auscultation.  HEART: Regular rate and rhythm without murmur.  ABDOMEN: Soft, nontender, and nondistended. Positive bowel sounds. No hepatosplenomegaly was noted.  EXTREMITIES: Without any cyanosis, clubbing, rash, lesions or edema.  NEUROLOGIC: Cranial nerves II through XII are grossly intact.  PSYCHIATRIC: Flat affect, but denies suicidal or homicidal ideations.  SKIN: No ulceration or induration present.      A/P: 9 months s/p sleeve gastrectomy and hiatal hernia repair with persistent dysphagia and de raisa severe medically refractory GERD. She may have a subtle hiatal hernia recurrence, but she does have a high pressure sleeve on manometry that was unresponsive to botox to the pylorus.     Will do a Bravo pH probe via EGD to quantify acid reflux. This could also be bile reflux given the onset following botox to the pylorus.     She is a very compliant bariatric patient, but will likely need revision of her sleeve to RYGB to relieve the high pressure sleeve causing dysphagia and GERD. This will also allow us to assess for hiatal hernia recurrence and repair it if necessary. The risks and benefits of revision surgery were explained to the patient including increased risk of bleeding, infection, injury to neighboring organ, prolonged hospitalization, need for further procedure, open surgery and death due to revision surgery and gastric bypass specific risks like ulcer and internal hernia have been explained to the patient and Erick Becerril has expressed understanding and acceptance of them.     She will need a preop nutrition refresher on post op nutrition.     Nacho Salmon MD  Bariatric and  Minimally Invasive General Surgery

## 2025-03-14 NOTE — PROGRESS NOTES
BARIATRIC SURGERY CLINIC  FOLLOW UP NOTE      Name: Erick Becerril  MRN: [id number]      Index Surgery  Date of Surgery: 6/4/24   Surgeon: Nacho Salmon MD  Surgical Procedure: Laparoscopic sleeve gastrectomy  76010 and Hiatal hernia repair 97856  Initial weight: 261 lbs  Pre-surgical weight: 267 lbs        Other Bariatric Surgeries  none    Visit: 9    months  Today's Visit:   Wt Readings from Last 1 Encounters:   03/14/25 73.5 kg (162 lb 1.6 oz)    Body mass index is 29.65 kg/m².   Last Visit:    Wt Readings from Last 3 Encounters:   03/14/25 73.5 kg (162 lb 1.6 oz)   02/14/25 74.8 kg (165 lb)   02/03/25 77.1 kg (170 lb)       Total weight loss: 95 lbs    HPI: new onset severe acid reflux, sometimes waking her from sleep and coming out her nose began a couple months ago. She is now requiring omeprazole 40 mg bid and carafate prn for control.     She did have egd with botox of the pylorus in December for high pressure sleeve - this helped her chest pressure/discomfort and dysphagia a little, but she still struggles with significant dysphagia.  She is very compliant with bariatric diet and exercise and vitamin regimen.     DIET INTAKE: Regular food        DAILY SUPPLEMENTS:  Calcium: Calcium Citrate w/ vitamin D (1200 - 1500mg)  Multivitamin & Minerals: 2 per day - bariatric fusion combo vitamin with iron  Iron Supplement: included in multi-vitamin  Vitamin B12: included in multi-vitamin  Vitamin D3: included in multi-vitamin  Other: none  PPI: omeprazole 40 mg bid and carafate prn    EXERCISE: walking 3-4 days a week and some gym           Current Outpatient Medications   Medication Sig Dispense Refill    acetaminophen (Tylenol) 325 mg tablet Take 2 tablets (650 mg) by mouth every 6 hours. 10/16/24  Pt verbalizes taking PRN [facility] L[facility]      ALPRAZolam (Xanax) 0.5 mg tablet take 1 to 2 tablets by mouth once daily if needed for anxiety      cholecalciferol (Vitamin D3) 50 MCG (2000 UT) tablet Take 1 tablet (2,000 Units) by  "mouth once daily.      FLUoxetine (PROzac) 20 mg capsule Take 1 capsule (20 mg) by mouth once daily.      multivitamin-min-iron-FA-vit K 45 mg iron- 800 mcg-120 mcg capsule Take 1 tablet by mouth 2 times a day.      omeprazole (PriLOSEC) 40 mg DR capsule Take 1 capsule (40 mg) by mouth 2 times a day. Do not crush or chew. 30 capsule 1    UNABLE TO FIND Take 1 tablet by mouth once daily. Med Name: Baritastic Calcium Supplement      sucralfate (Carafate) 1 gram tablet Take 1 tablet (1 g) by mouth 4 times a day before meals. (Patient not taking: Reported on 3/14/2025) 120 tablet 11     No current facility-administered medications for this visit.       Comorbidities:  No problem-specific Assessment & Plan notes found for this encounter.        REVIEW OF SYSTEMS:  CONSTITUTIONAL: Patient denies fevers, chills, sweats and weight changes.  EYES: Patient denies any visual symptoms.  EARS, NOSE, AND THROAT: No difficulties with hearing. No symptoms of rhinitis or sore throat.  CARDIOVASCULAR: Patient denies chest pains, palpitations, orthopnea and paroxysmal nocturnal dyspnea.  RESPIRATORY: No dyspnea on exertion, no wheezing or cough.  GI: severe gerd and dysphagia  : No urinary hesitancy or dribbling. No nocturia or urinary frequency. No abnormal urethral discharge.  MUSCULOSKELETAL: No myalgias or arthralgias.  NEUROLOGIC: No chronic headaches, no seizures. Patient denies numbness, tingling or weakness.  PSYCHIATRIC: Patient denies problems with mood disturbance. No problems with anxiety.  ENDOCRINE: No excessive urination or excessive thirst.  DERMATOLOGIC: Patient denies any rashes or skin changes.    PHYSICAL EXAM:  /70 (BP Location: Right arm, Patient Position: Sitting, BP Cuff Size: Adult)   Pulse 79   Ht 1.575 m (5' 2\")   Wt 73.5 kg (162 lb 1.6 oz)   LMP 07/29/2022   SpO2 100%   BMI 29.65 kg/m²   GENERAL: No apparent distress. Pt is alert and oriented x3.  HEENT: Head is normocephalic and atraumatic. " Extraocular muscles are intact. Pupils are equal, round, and reactive to light and accommodation. Nares appeared normal. Mouth is well hydrated and without lesions. Mucous membranes are moist. Posterior pharynx clear of any exudate or lesions.  NECK: Supple. No carotid bruits. No lymphadenopathy or thyromegaly.  LUNGS: Clear to auscultation.  HEART: Regular rate and rhythm without murmur.  ABDOMEN: Soft, nontender, and nondistended. Positive bowel sounds. No hepatosplenomegaly was noted.  EXTREMITIES: Without any cyanosis, clubbing, rash, lesions or edema.  NEUROLOGIC: Cranial nerves II through XII are grossly intact.  PSYCHIATRIC: Flat affect, but denies suicidal or homicidal ideations.  SKIN: No ulceration or induration present.      A/P: 9 months s/p sleeve gastrectomy and hiatal hernia repair with persistent dysphagia and de raisa severe medically refractory GERD. She may have a subtle hiatal hernia recurrence, but she does have a high pressure sleeve on manometry that was unresponsive to botox to the pylorus.     Will do a Bravo pH probe via EGD to quantify acid reflux. This could also be bile reflux given the onset following botox to the pylorus.     She is a very compliant bariatric patient, but will likely need revision of her sleeve to RYGB to relieve the high pressure sleeve causing dysphagia and GERD. This will also allow us to assess for hiatal hernia recurrence and repair it if necessary. The risks and benefits of revision surgery were explained to the patient including increased risk of bleeding, infection, injury to neighboring organ, prolonged hospitalization, need for further procedure, open surgery and death due to revision surgery and gastric bypass specific risks like ulcer and internal hernia have been explained to the patient and Erick Becerril has expressed understanding and acceptance of them.     She will need a preop nutrition refresher on post op nutrition.     Nacho Salmon MD  Bariatric and  Minimally Invasive General Surgery

## 2025-03-21 ENCOUNTER — TELEPHONE (OUTPATIENT)
Dept: PREADMISSION TESTING | Facility: HOSPITAL | Age: 30
End: 2025-03-21
Payer: COMMERCIAL

## 2025-03-21 NOTE — TELEPHONE ENCOUNTER
SURGERY PRE-OPERATIVE INSTRUCTIONS    *You will receive a phone call the day before your procedure  after 2pm, (or the Friday before your surgery if scheduled on a Monday.) Generally the hospital will be calling you with this information after that time.    *If you are taking GLP1 medications for type 2 diabetes or weight loss, hold these medications on the day of the procedure. START a clear liquid diet 24 hours before your surgery. On the day of your surgery/procedure, STOP all clear liquids 2 hours before your arrival time to the hospital. A clear liquid diet consists of clear liquids and foods that melt into a clear liquid. Clear liquids can and should contain sugar. This is only if you are taking a GLP1 medication.     *You are not to eat after midnight the night before the surgery. You may have up to 10 ounces of clear liquids up until 2 hours prior to arriving to the hospital. The exception is with medications you were instructed to take day of surgery.     *You may take tylenol for pain/discomfort as needed.     *Stop taking all aspirin products, ibuprofen (motrin/advil), naproxen (aleve/naprosyn) for one week prior to surgery.    *Stop taking all vitamins and supplements one week prior to surgery.     *You should not have alcoholic beverages for 24 hours before surgery.     *You should not smoke 24 hours prior to surgery.     *To help prevent surgical infections bathe/shower with Dial soap the evening before surgery.    *You can wear deodorant but no lotion, powder, or perfume/cologne. You should remove all make-up and nail polish at home.    *If you wear glasses, please bring a case for the glasses with you.    *You will be asked to remove dentures and contacts.     *Please leave all valuables at home.    *You should wear loose, comfortable clothing that will accommodate bandages and/or casts.    *You should notify your doctor of any change in your condition (fever, cold, rash, etc). Surgery may need to be  re-scheduled until a time you are in better health.    *A responsible adult is required to accompany you to and from the hospital if you are receiving anesthesia or a sedative. Patients are not permitted to drive for 24 hours after anesthesia.     *You can use the [facility] parking if you wish.     *If you have any further questions please call [phone].

## 2025-03-24 ENCOUNTER — ANESTHESIA EVENT (OUTPATIENT)
Dept: OPERATING ROOM | Facility: HOSPITAL | Age: 30
End: 2025-03-24
Payer: COMMERCIAL

## 2025-03-25 ENCOUNTER — ANESTHESIA (OUTPATIENT)
Dept: OPERATING ROOM | Facility: HOSPITAL | Age: 30
End: 2025-03-25
Payer: COMMERCIAL

## 2025-03-25 ENCOUNTER — HOSPITAL ENCOUNTER (OUTPATIENT)
Dept: OPERATING ROOM | Facility: HOSPITAL | Age: 30
Setting detail: OUTPATIENT SURGERY
Discharge: HOME | End: 2025-03-25
Payer: COMMERCIAL

## 2025-03-25 VITALS
DIASTOLIC BLOOD PRESSURE: 73 MMHG | TEMPERATURE: 98.6 F | RESPIRATION RATE: 15 BRPM | OXYGEN SATURATION: 100 % | BODY MASS INDEX: 29.97 KG/M2 | WEIGHT: 158.73 LBS | HEART RATE: 74 BPM | SYSTOLIC BLOOD PRESSURE: 120 MMHG | HEIGHT: 61 IN

## 2025-03-25 DIAGNOSIS — Z90.3 S/P GASTRIC SLEEVE PROCEDURE: ICD-10-CM

## 2025-03-25 DIAGNOSIS — K21.9 GASTROESOPHAGEAL REFLUX DISEASE, UNSPECIFIED WHETHER ESOPHAGITIS PRESENT: ICD-10-CM

## 2025-03-25 DIAGNOSIS — R13.19 ESOPHAGEAL DYSPHAGIA: ICD-10-CM

## 2025-03-25 PROCEDURE — 3700000001 HC GENERAL ANESTHESIA TIME - INITIAL BASE CHARGE: Performed by: STUDENT IN AN ORGANIZED HEALTH CARE EDUCATION/TRAINING PROGRAM

## 2025-03-25 PROCEDURE — 7100000009 HC PHASE TWO TIME - INITIAL BASE CHARGE: Performed by: STUDENT IN AN ORGANIZED HEALTH CARE EDUCATION/TRAINING PROGRAM

## 2025-03-25 PROCEDURE — A43257 PR EDG DELIVER THERMAL ENERGY SPHNCTR/CARDIA GERD: Performed by: STUDENT IN AN ORGANIZED HEALTH CARE EDUCATION/TRAINING PROGRAM

## 2025-03-25 PROCEDURE — A43257 PR EDG DELIVER THERMAL ENERGY SPHNCTR/CARDIA GERD: Performed by: NURSE ANESTHETIST, CERTIFIED REGISTERED

## 2025-03-25 PROCEDURE — 2720000007 HC OR 272 NO HCPCS: Performed by: STUDENT IN AN ORGANIZED HEALTH CARE EDUCATION/TRAINING PROGRAM

## 2025-03-25 PROCEDURE — 2500000004 HC RX 250 GENERAL PHARMACY W/ HCPCS (ALT 636 FOR OP/ED): Performed by: STUDENT IN AN ORGANIZED HEALTH CARE EDUCATION/TRAINING PROGRAM

## 2025-03-25 PROCEDURE — 3600000007 HC OR TIME - EACH INCREMENTAL 1 MINUTE - PROCEDURE LEVEL TWO: Performed by: STUDENT IN AN ORGANIZED HEALTH CARE EDUCATION/TRAINING PROGRAM

## 2025-03-25 PROCEDURE — 2500000004 HC RX 250 GENERAL PHARMACY W/ HCPCS (ALT 636 FOR OP/ED): Performed by: NURSE ANESTHETIST, CERTIFIED REGISTERED

## 2025-03-25 PROCEDURE — 43235 EGD DIAGNOSTIC BRUSH WASH: CPT | Performed by: SURGERY

## 2025-03-25 PROCEDURE — 3600000002 HC OR TIME - INITIAL BASE CHARGE - PROCEDURE LEVEL TWO: Performed by: STUDENT IN AN ORGANIZED HEALTH CARE EDUCATION/TRAINING PROGRAM

## 2025-03-25 PROCEDURE — 3700000002 HC GENERAL ANESTHESIA TIME - EACH INCREMENTAL 1 MINUTE: Performed by: STUDENT IN AN ORGANIZED HEALTH CARE EDUCATION/TRAINING PROGRAM

## 2025-03-25 PROCEDURE — 7100000010 HC PHASE TWO TIME - EACH INCREMENTAL 1 MINUTE: Performed by: STUDENT IN AN ORGANIZED HEALTH CARE EDUCATION/TRAINING PROGRAM

## 2025-03-25 RX ORDER — LIDOCAINE HYDROCHLORIDE 10 MG/ML
0.1 INJECTION, SOLUTION EPIDURAL; INFILTRATION; INTRACAUDAL; PERINEURAL ONCE
Status: DISCONTINUED | OUTPATIENT
Start: 2025-03-25 | End: 2025-03-26 | Stop reason: HOSPADM

## 2025-03-25 RX ORDER — OXYCODONE HYDROCHLORIDE 5 MG/1
10 TABLET ORAL EVERY 4 HOURS PRN
Status: DISCONTINUED | OUTPATIENT
Start: 2025-03-25 | End: 2025-03-26 | Stop reason: HOSPADM

## 2025-03-25 RX ORDER — MIDAZOLAM HYDROCHLORIDE 1 MG/ML
INJECTION, SOLUTION INTRAMUSCULAR; INTRAVENOUS AS NEEDED
Status: DISCONTINUED | OUTPATIENT
Start: 2025-03-25 | End: 2025-03-25

## 2025-03-25 RX ORDER — PROPOFOL 10 MG/ML
INJECTION, EMULSION INTRAVENOUS AS NEEDED
Status: DISCONTINUED | OUTPATIENT
Start: 2025-03-25 | End: 2025-03-25

## 2025-03-25 RX ORDER — ACETAMINOPHEN 325 MG/1
650 TABLET ORAL EVERY 4 HOURS PRN
Status: DISCONTINUED | OUTPATIENT
Start: 2025-03-25 | End: 2025-03-26 | Stop reason: HOSPADM

## 2025-03-25 RX ORDER — FENTANYL CITRATE 50 UG/ML
INJECTION, SOLUTION INTRAMUSCULAR; INTRAVENOUS AS NEEDED
Status: DISCONTINUED | OUTPATIENT
Start: 2025-03-25 | End: 2025-03-25

## 2025-03-25 RX ORDER — OXYCODONE HYDROCHLORIDE 5 MG/1
5 TABLET ORAL EVERY 4 HOURS PRN
Status: DISCONTINUED | OUTPATIENT
Start: 2025-03-25 | End: 2025-03-26 | Stop reason: HOSPADM

## 2025-03-25 RX ORDER — SODIUM CHLORIDE, SODIUM LACTATE, POTASSIUM CHLORIDE, CALCIUM CHLORIDE 600; 310; 30; 20 MG/100ML; MG/100ML; MG/100ML; MG/100ML
75 INJECTION, SOLUTION INTRAVENOUS CONTINUOUS
Status: DISCONTINUED | OUTPATIENT
Start: 2025-03-25 | End: 2025-03-26 | Stop reason: HOSPADM

## 2025-03-25 RX ORDER — LIDOCAINE HYDROCHLORIDE 10 MG/ML
INJECTION, SOLUTION EPIDURAL; INFILTRATION; INTRACAUDAL; PERINEURAL AS NEEDED
Status: DISCONTINUED | OUTPATIENT
Start: 2025-03-25 | End: 2025-03-25

## 2025-03-25 RX ADMIN — PROPOFOL 36.1 MG: 10 INJECTION, EMULSION INTRAVENOUS at 15:12

## 2025-03-25 RX ADMIN — LIDOCAINE HYDROCHLORIDE 50 MG: 10 SOLUTION INTRAVENOUS at 14:56

## 2025-03-25 RX ADMIN — MIDAZOLAM 1 MG: 1 INJECTION INTRAMUSCULAR; INTRAVENOUS at 14:59

## 2025-03-25 RX ADMIN — PROPOFOL 40 MG: 10 INJECTION, EMULSION INTRAVENOUS at 15:04

## 2025-03-25 RX ADMIN — SODIUM CHLORIDE, POTASSIUM CHLORIDE, SODIUM LACTATE AND CALCIUM CHLORIDE 75 ML/HR: 600; 310; 30; 20 INJECTION, SOLUTION INTRAVENOUS at 13:42

## 2025-03-25 RX ADMIN — PROPOFOL 50 MG: 10 INJECTION, EMULSION INTRAVENOUS at 14:56

## 2025-03-25 RX ADMIN — MIDAZOLAM 1 MG: 1 INJECTION INTRAMUSCULAR; INTRAVENOUS at 14:52

## 2025-03-25 RX ADMIN — FENTANYL CITRATE 50 MCG: 50 INJECTION, SOLUTION INTRAMUSCULAR; INTRAVENOUS at 14:59

## 2025-03-25 RX ADMIN — PROPOFOL 120 MCG/KG/MIN: 10 INJECTION, EMULSION INTRAVENOUS at 14:58

## 2025-03-25 RX ADMIN — FENTANYL CITRATE 50 MCG: 50 INJECTION, SOLUTION INTRAMUSCULAR; INTRAVENOUS at 14:52

## 2025-03-25 ASSESSMENT — PAIN SCALES - GENERAL
PAINLEVEL_OUTOF10: 0 - NO PAIN
PAINLEVEL_OUTOF10: 0 - NO PAIN

## 2025-03-25 ASSESSMENT — PAIN - FUNCTIONAL ASSESSMENT: PAIN_FUNCTIONAL_ASSESSMENT: 0-10

## 2025-03-25 NOTE — ANESTHESIA PREPROCEDURE EVALUATION
Patient: Erick Becerril    Procedure Information       Date/Time: 03/25/25 4815    Scheduled providers: Nacho Salmon MD; Jhon Rodríguez DO    Procedures:       BRAVO      EGD    Location: [facility] OR            Relevant Problems   Neuro   (+) Anxiety      GI   (+) Esophageal dysphagia   (+) Hiatal hernia      Endocrine   (+) Class 3 severe obesity due to excess calories with body mass index (BMI) of 45.0 to 49.9 in adult   (+) Morbid obesity due to excess calories (Multi)      Hematology   (+) Anemia      HEENT   (+) Seasonal allergies      ID   (+) Furuncle of umbilicus   (+) Hand, foot and mouth disease   (+) Wound infection       Clinical information reviewed:   Tobacco  Allergies    Med Hx  Surg Hx  OB Status  Fam Hx  Soc Hx        NPO Detail:  No data recorded     Physical Exam    Airway  Mallampati: I  TM distance: >3 FB  Neck ROM: full     Cardiovascular - normal exam     Dental    Pulmonary - normal exam     Abdominal          Anesthesia Plan    History of general anesthesia?: yes  History of complications of general anesthesia?: no    ASA 2     MAC     intravenous induction   Anesthetic plan and risks discussed with patient.  Use of blood products discussed with patient who.    Plan discussed with CRNA and attending.

## 2025-03-25 NOTE — INTERVAL H&P NOTE
H&P from 3/14/25 reviewed. The patient was examined and there are no changes to the H&P. PMH, PSH, Meds, and PE are unchanged.    Nacho Salmon MD  Bariatric and Minimally Invasive General Surgery

## 2025-03-26 NOTE — ANESTHESIA POSTPROCEDURE EVALUATION
Patient: Erick Becerril    Procedure Summary       Date: 03/25/25 Room / Location: [facility] OR    Anesthesia Start: 1450 Anesthesia Stop: 1519    Procedures:       BRAVO      EGD Diagnosis:       Gastroesophageal reflux disease, unspecified whether esophagitis present      Esophageal dysphagia      S/P gastric sleeve procedure    Scheduled Providers: Nacho Salmon MD; Jhon Rodríguez DO Responsible Provider: Jhon Rodríguez DO    Anesthesia Type: MAC ASA Status: 2            Anesthesia Type: MAC    Vitals Value Taken Time   /73 03/25/25 1529   Temp 37 °C (98.6 °F) 03/25/25 1514   Pulse 74 03/25/25 1529   Resp 15 03/25/25 1529   SpO2 100 % 03/25/25 1529       Anesthesia Post Evaluation    Patient participation: complete - patient participated  Level of consciousness: awake and alert  Pain management: adequate  Airway patency: patent  Cardiovascular status: acceptable  Respiratory status: acceptable  Hydration status: acceptable  Postoperative Nausea and Vomiting: none        No notable events documented.

## 2025-04-16 ENCOUNTER — TELEPHONE (OUTPATIENT)
Dept: SURGERY | Facility: CLINIC | Age: 30
End: 2025-04-16
Payer: COMMERCIAL

## 2025-04-16 NOTE — TELEPHONE ENCOUNTER
I attempted to call the patient to get the pre op class and appointment scheduled. I called LVM stating the above and left the office number.

## 2025-04-21 ENCOUNTER — PREP FOR PROCEDURE (OUTPATIENT)
Dept: SURGERY | Facility: CLINIC | Age: 30
End: 2025-04-21
Payer: COMMERCIAL

## 2025-04-21 DIAGNOSIS — K21.00 BILE REFLUX ESOPHAGITIS: Primary | ICD-10-CM

## 2025-04-21 DIAGNOSIS — K44.9 HIATAL HERNIA WITH GASTROESOPHAGEAL REFLUX DISEASE WITHOUT ESOPHAGITIS: ICD-10-CM

## 2025-04-21 DIAGNOSIS — K21.9 HIATAL HERNIA WITH GASTROESOPHAGEAL REFLUX DISEASE WITHOUT ESOPHAGITIS: ICD-10-CM

## 2025-04-21 RX ORDER — CEFAZOLIN SODIUM 2 G/100ML
2 INJECTION, SOLUTION INTRAVENOUS ONCE
OUTPATIENT
Start: 2025-04-21 | End: 2025-04-21

## 2025-04-21 RX ORDER — SCOPOLAMINE 1 MG/3D
1 PATCH, EXTENDED RELEASE TRANSDERMAL ONCE
OUTPATIENT
Start: 2025-04-21 | End: 2025-04-21

## 2025-04-21 RX ORDER — APREPITANT 40 MG/1
40 CAPSULE ORAL ONCE
OUTPATIENT
Start: 2025-04-21 | End: 2025-04-21

## 2025-04-21 RX ORDER — CELECOXIB 400 MG/1
400 CAPSULE ORAL ONCE
OUTPATIENT
Start: 2025-04-21 | End: 2025-04-21

## 2025-04-21 RX ORDER — GABAPENTIN 300 MG/1
600 CAPSULE ORAL ONCE
OUTPATIENT
Start: 2025-04-21 | End: 2025-04-21

## 2025-04-21 RX ORDER — HEPARIN SODIUM 5000 [USP'U]/ML
5000 INJECTION, SOLUTION INTRAVENOUS; SUBCUTANEOUS ONCE
OUTPATIENT
Start: 2025-04-21

## 2025-04-21 RX ORDER — ACETAMINOPHEN 325 MG/1
650 TABLET ORAL ONCE
OUTPATIENT
Start: 2025-04-21 | End: 2025-04-21

## 2025-04-24 ENCOUNTER — TELEPHONE (OUTPATIENT)
Dept: SURGERY | Facility: CLINIC | Age: 30
End: 2025-04-24
Payer: COMMERCIAL

## 2025-04-24 NOTE — TELEPHONE ENCOUNTER
Thank you for speaking with me earlier today & confirming your scheduled visit with Dr. Salmon on 5/2/25 at 1:15pm at [facility] (inside [facility], main floor in the [facility]ty [facility]).   Parking is available at a cost of $5.00 at the Main Entrance.  We look forward to seeing you!  Maria Elena Bates RN, Clinic Nurse

## 2025-04-28 ENCOUNTER — PRE-ADMISSION TESTING (OUTPATIENT)
Dept: PREADMISSION TESTING | Facility: HOSPITAL | Age: 30
End: 2025-04-28
Payer: COMMERCIAL

## 2025-04-28 VITALS
OXYGEN SATURATION: 98 % | WEIGHT: 152.34 LBS | SYSTOLIC BLOOD PRESSURE: 109 MMHG | BODY MASS INDEX: 28.03 KG/M2 | TEMPERATURE: 98.6 F | RESPIRATION RATE: 18 BRPM | HEART RATE: 79 BPM | DIASTOLIC BLOOD PRESSURE: 75 MMHG | HEIGHT: 62 IN

## 2025-04-28 DIAGNOSIS — K21.00 BILE REFLUX ESOPHAGITIS: ICD-10-CM

## 2025-04-28 DIAGNOSIS — K44.9 HIATAL HERNIA WITH GASTROESOPHAGEAL REFLUX DISEASE WITHOUT ESOPHAGITIS: ICD-10-CM

## 2025-04-28 DIAGNOSIS — K21.9 HIATAL HERNIA WITH GASTROESOPHAGEAL REFLUX DISEASE WITHOUT ESOPHAGITIS: ICD-10-CM

## 2025-04-28 DIAGNOSIS — Z01.818 PREOPERATIVE EXAMINATION: Primary | ICD-10-CM

## 2025-04-28 LAB
ABO GROUP (TYPE) IN BLOOD: NORMAL
ANION GAP SERPL CALC-SCNC: 12 MMOL/L (ref 10–20)
ANTIBODY SCREEN: NORMAL
APTT PPP: 31 SECONDS (ref 26–36)
BUN SERPL-MCNC: 13 MG/DL (ref 6–23)
CALCIUM SERPL-MCNC: 9.4 MG/DL (ref 8.6–10.3)
CHLORIDE SERPL-SCNC: 106 MMOL/L (ref 98–107)
CO2 SERPL-SCNC: 27 MMOL/L (ref 21–32)
CREAT SERPL-MCNC: 0.7 MG/DL (ref 0.5–1.05)
EGFRCR SERPLBLD CKD-EPI 2021: >90 ML/MIN/1.73M*2
ERYTHROCYTE [DISTWIDTH] IN BLOOD BY AUTOMATED COUNT: 13.9 % (ref 11.5–14.5)
GLUCOSE SERPL-MCNC: 89 MG/DL (ref 74–99)
HCT VFR BLD AUTO: 37.4 % (ref 36–46)
HGB BLD-MCNC: 12.5 G/DL (ref 12–16)
INR PPP: 1.1 (ref 0.9–1.1)
MCH RBC QN AUTO: 29.3 PG (ref 26–34)
MCHC RBC AUTO-ENTMCNC: 33.4 G/DL (ref 32–36)
MCV RBC AUTO: 88 FL (ref 80–100)
NRBC BLD-RTO: 0 /100 WBCS (ref 0–0)
PLATELET # BLD AUTO: 276 X10*3/UL (ref 150–450)
POTASSIUM SERPL-SCNC: 4.2 MMOL/L (ref 3.5–5.3)
PROTHROMBIN TIME: 12.2 SECONDS (ref 9.8–12.4)
RBC # BLD AUTO: 4.27 X10*6/UL (ref 4–5.2)
RH FACTOR (ANTIGEN D): NORMAL
SODIUM SERPL-SCNC: 141 MMOL/L (ref 136–145)
WBC # BLD AUTO: 5.9 X10*3/UL (ref 4.4–11.3)

## 2025-04-28 PROCEDURE — 86901 BLOOD TYPING SEROLOGIC RH(D): CPT

## 2025-04-28 PROCEDURE — 85610 PROTHROMBIN TIME: CPT | Performed by: SURGERY

## 2025-04-28 PROCEDURE — 36415 COLL VENOUS BLD VENIPUNCTURE: CPT

## 2025-04-28 PROCEDURE — 99244 OFF/OP CNSLTJ NEW/EST MOD 40: CPT | Performed by: NURSE PRACTITIONER

## 2025-04-28 PROCEDURE — 80048 BASIC METABOLIC PNL TOTAL CA: CPT | Performed by: SURGERY

## 2025-04-28 PROCEDURE — 85027 COMPLETE CBC AUTOMATED: CPT | Performed by: SURGERY

## 2025-04-28 ASSESSMENT — DUKE ACTIVITY SCORE INDEX (DASI)
CAN YOU DO HEAVY WORK AROUND THE HOUSE LIKE SCRUBBING FLOORS OR LIFTING AND MOVING HEAVY FURNITURE: YES
CAN YOU PARTICIPATE IN MODERATE RECREATIONAL ACTIVITIES LIKE GOLF, BOWLING, DANCING, DOUBLES TENNIS OR THROWING A BASEBALL OR FOOTBALL: YES
CAN YOU CLIMB A FLIGHT OF STAIRS OR WALK UP A HILL: YES
CAN YOU WALK INDOORS, SUCH AS AROUND YOUR HOUSE: YES
CAN YOU RUN A SHORT DISTANCE: YES
CAN YOU DO YARD WORK LIKE RAKING LEAVES, WEEDING OR PUSHING A MOWER: YES
CAN YOU PARTICIPATE IN STRENOUS SPORTS LIKE SWIMMING, SINGLES TENNIS, FOOTBALL, BASKETBALL, OR SKIING: YES
TOTAL_SCORE: 58.2
CAN YOU HAVE SEXUAL RELATIONS: YES
CAN YOU WALK A BLOCK OR TWO ON LEVEL GROUND: YES
CAN YOU DO LIGHT WORK AROUND THE HOUSE LIKE DUSTING OR WASHING DISHES: YES
CAN YOU DO MODERATE WORK AROUND THE HOUSE LIKE VACUUMING, SWEEPING FLOORS OR CARRYING GROCERIES: YES
DASI METS SCORE: 9.9
CAN YOU TAKE CARE OF YOURSELF (EAT, DRESS, BATHE, OR USE TOILET): YES

## 2025-04-28 ASSESSMENT — ENCOUNTER SYMPTOMS
GASTROINTESTINAL NEGATIVE: 1
NEUROLOGICAL NEGATIVE: 1
MUSCULOSKELETAL NEGATIVE: 1
CONSTITUTIONAL NEGATIVE: 1
RESPIRATORY NEGATIVE: 1
EYES NEGATIVE: 1
CARDIOVASCULAR NEGATIVE: 1

## 2025-04-28 ASSESSMENT — PAIN - FUNCTIONAL ASSESSMENT: PAIN_FUNCTIONAL_ASSESSMENT: 0-10

## 2025-04-28 ASSESSMENT — PAIN SCALES - GENERAL: PAINLEVEL_OUTOF10: 0 - NO PAIN

## 2025-04-28 ASSESSMENT — LIFESTYLE VARIABLES: SMOKING_STATUS: NONSMOKER

## 2025-04-28 NOTE — CPM/PAT H&P
CPM/PAT Evaluation       Name: Erick Becerril (Erick Becerril)  /Age: 1995/30 y.o.     Visit Type:   In-Person       Chief Complaint: Hiatal hernia with GERD    HPI 29 y/o female scheduled for gastric bypass; hernia repair on 2025 with  Dr. Salmon secondary to hiatal hernia with GERD.  PMHX includes GERD, esophageal dysphagia s/p gastric sleeve .  PAT is consulted today for perioperative risk stratification and optimization.      Medical History[1]    Surgical History[2]    Patient  reports being sexually active and has had partner(s) who are male. She reports using the following methods of birth control/protection: Female Sterilization and None.    Family History[3]    Allergies[4]    Prior to Admission medications    Medication Sig Start Date End Date Taking? Authorizing Provider   acetaminophen (Tylenol) 325 mg tablet Take 2 tablets (650 mg) by mouth every 6 hours. 10/16/24  Pt verbalizes taking PRN [facility] LPN 24   Nacho Salmon MD   ALPRAZolam (Xanax) 0.5 mg tablet take 1 to 2 tablets by mouth once daily if needed for anxiety 23   Historical Provider, MD   cholecalciferol (Vitamin D3) 50 MCG (2000 UT) tablet Take 1 tablet (2,000 Units) by mouth once daily. 14   Historical Provider, MD   FLUoxetine (PROzac) 20 mg capsule Take 1 capsule (20 mg) by mouth once daily. 23   Historical Provider, MD   multivitamin-min-iron-FA-vit K 45 mg iron- 800 mcg-120 mcg capsule Take 1 tablet by mouth 2 times a day.    Historical Provider, MD   omeprazole (PriLOSEC) 40 mg DR capsule Take 1 capsule (40 mg) by mouth 2 times a day. Do not crush or chew. 1/24/25 3/25/25  Dominick CHATTERJEE MD   sucralfate (Carafate) 1 gram tablet Take 1 tablet (1 g) by mouth 4 times a day before meals. 25  Dominick CHATTERJEE MD   UNABLE TO FIND Take 1 tablet by mouth once daily. Med Name: Baritastic Calcium Supplement    Historical Provider, MD DE ROS:   Constitutional:   neg    Neuro/Psych:   neg    Eyes:  "  neg    Ears:   neg    Nose:   Mouth:   Throat:   Neck:   Cardio:   neg    Respiratory:   neg    Endocrine:   GI:   neg    :   neg    Musculoskeletal:   neg    Hematologic:   neg    Skin:      Physical Exam  Vitals reviewed.   Constitutional:       Appearance: Normal appearance.   HENT:      Head: Normocephalic and atraumatic.      Mouth/Throat:      Mouth: Mucous membranes are moist.      Pharynx: Oropharynx is clear.   Eyes:      Extraocular Movements: Extraocular movements intact.      Pupils: Pupils are equal, round, and reactive to light.   Cardiovascular:      Rate and Rhythm: Normal rate and regular rhythm.   Pulmonary:      Effort: Pulmonary effort is normal.      Breath sounds: Normal breath sounds.   Abdominal:      General: Abdomen is flat.      Palpations: Abdomen is soft.   Musculoskeletal:         General: Normal range of motion.      Cervical back: Normal range of motion and neck supple.   Skin:     General: Skin is warm and dry.   Neurological:      General: No focal deficit present.      Mental Status: She is alert and oriented to person, place, and time.   Psychiatric:         Mood and Affect: Mood normal.         Behavior: Behavior normal.          Airway    Testing/Diagnostic:     Patient Specialist/PCP:     Visit Vitals  /75   Pulse 79   Temp 37 °C (98.6 °F) (Temporal)   Resp 18   Ht 1.575 m (5' 2\")   Wt 69.1 kg (152 lb 5.4 oz)   LMP 07/29/2022   SpO2 98%   BMI 27.86 kg/m²   OB Status Hysterectomy   Smoking Status Never   BSA 1.74 m²       DASI Risk Score      Flowsheet Row Pre-Admission Testing from 4/28/2025 in [facility] Questionnaire Series Submission from 11/9/2023 in [facility] with Generic Provider Candelario   Can you take care of yourself (eat, dress, bathe, or use toilet)?  2.75 filed at 04/28/2025 0824 2.75  filed at 11/09/2023 0904   Can you walk indoors, such as around your house? 1.75 filed at 04/28/2025 0824 1.75  filed at 11/09/2023 0904   Can you walk a " block or two on level ground?  2.75 filed at 04/28/2025 0824 2.75  filed at 11/09/2023 0904   Can you climb a flight of stairs or walk up a hill? 5.5 filed at 04/28/2025 0824 5.5  filed at 11/09/2023 0904   Can you run a short distance? 8 filed at 04/28/2025 0824 0  filed at 11/09/2023 0904   Can you do light work around the house like dusting or washing dishes? 2.7 filed at 04/28/2025 0824 2.7  filed at 11/09/2023 0904   Can you do moderate work around the house like vacuuming, sweeping floors or carrying groceries? 3.5 filed at 04/28/2025 0824 3.5  filed at 11/09/2023 0904   Can you do heavy work around the house like scrubbing floors or lifting and moving heavy furniture?  8 filed at 04/28/2025 0824 0  filed at 11/09/2023 0904   Can you do yard work like raking leaves, weeding or pushing a mower? 4.5 filed at 04/28/2025 0824 4.5  filed at 11/09/2023 0904   Can you have sexual relations? 5.25 filed at 04/28/2025 0824 5.25  filed at 11/09/2023 0904   Can you participate in moderate recreational activities like golf, bowling, dancing, doubles tennis or throwing a baseball or football? 6 filed at 04/28/2025 0824 0  filed at 11/09/2023 0904   Can you participate in strenous sports like swimming, singles tennis, football, basketball, or skiing? 7.5 filed at 04/28/2025 0824 0  filed at 11/09/2023 0904   DASI SCORE 58.2 filed at 04/28/2025 0824 28.7 filed at 11/09/2023 0904   METS Score (Will be calculated only when all the questions are answered) 9.9 filed at 04/28/2025 0824 6.3 filed at 11/09/2023 0904          Caprini DVT Assessment      Flowsheet Row Pre-Admission Testing from 4/28/2025 in [facility] Admission (Discharged) from 6/4/2024 in [facility] with Nacho Salmon MD   DVT Score (IF A SCORE IS NOT CALCULATING, MUST SELECT A BMI TO COMPLETE) 3 filed at 04/28/2025 0834 5 filed at 06/04/2024 0959   Surgical Factors Major surgery planned, including arthroscopic and laproscopic  (1-2 hours) filed at 04/28/2025 0834 --   BMI (BMI MUST BE CHOSEN) 30 or less filed at 04/28/2025 0834 41-50 (Morbid obesity) filed at 06/04/2024 0959   RETIRED: Current Status -- Major surgery planned, including arthroscopic and laproscopic (1-2 hours) filed at 06/04/2024 0959   RETIRED: Age -- Less than 40 years filed at 06/04/2024 0959          Modified Frailty Index    No data to display       [id number] Stroke Risk Points  Current as of 2 minutes ago        N/A 0 to 9 Points:      Last Change: N/A          The [id number] risk score (Lip SAVANNAH, et al. 2009. © 2010 [employer]) quantifies the risk of stroke for a patient with atrial fibrillation. For patients without atrial fibrillation or under the age of 18 this score appears as N/A. Higher score values generally indicate higher risk of stroke.        This score is not applicable to this patient. Components are not calculated.          Revised Cardiac Risk Index      Flowsheet Row Pre-Admission Testing from 4/28/2025 in [facility]   High-Risk Surgery (Intraperitoneal, Intrathoracic,Suprainguinal vascular) 0 filed at 04/28/2025 0841   History of ischemic heart disease (History of MI, History of positive exercuse test, Current chest paint considered due to myocardial ischemia, Use of nitrate therapy, ECG with pathological Q Waves) 0 filed at 04/28/2025 0841   History of congestive heart failure (pulmonary edemia, bilateral rales or S3 gallop, Paroxysmal nocturnal dyspnea, CXR showing pulmonary vascular redistribution) 0 filed at 04/28/2025 0841   History of cerebrovascular disease (Prior TIA or stroke) 0 filed at 04/28/2025 0841   Pre-operative insulin treatment 0 filed at 04/28/2025 0841   Pre-operative creatinine>2 mg/dl 0 filed at 04/28/2025 0841   Revised Cardiac Risk Calculator 0 filed at 04/28/2025 0841          Apfel Simplified Score      Flowsheet Row Pre-Admission Testing from 4/28/2025 in [facility]    Smoking status 1 filed at 04/28/2025 0841   History of motion sickness or PONV  0 filed at 04/28/2025 0841   Use of postoperative opioids 1 filed at 04/28/2025 0841   Gender - Female 1=Yes filed at 04/28/2025 0841   Apfel Simplified Score Calculator 3 filed at 04/28/2025 0841          Risk Analysis Index Results This Encounter    No data found in the last 10 encounters.       Stop Bang Score      Flowsheet Row Pre-Admission Testing from 4/28/2025 in [facility] Questionnaire Series Submission from 11/9/2023 in [facility] with Generic Provider Candelario   Do you snore loudly? 0 filed at 04/28/2025 0823 0  filed at 11/09/2023 0904   Do you often feel tired or fatigued after your sleep? 0 filed at 04/28/2025 0823 0  filed at 11/09/2023 0904   Has anyone ever observed you stop breathing in your sleep? 0 filed at 04/28/2025 0823 0  filed at 11/09/2023 0904   Do you have or are you being treated for high blood pressure? 0 filed at 04/28/2025 0823 0  filed at 11/09/2023 0904   Recent BMI (Calculated) 30 filed at 04/28/2025 0823 47.5 filed at 11/09/2023 0904   Is BMI greater than 35 kg/m2? 0=No filed at 04/28/2025 0823 1=Yes filed at 11/09/2023 0904   Age older than 50 years old? 0=No filed at 04/28/2025 0823 0=No filed at 11/09/2023 0904   Is your neck circumference greater than 17 inches (Male) or 16 inches (Female)? 0 filed at 04/28/2025 0823 --   Gender - Male 0=No filed at 04/28/2025 0823 0=No filed at 11/09/2023 0904   STOP-BANG Total Score 0 filed at 04/28/2025 0823 --          Prodigy: High Risk  Total Score: 0          ARISCAT Score for Postoperative Pulmonary Complications      Flowsheet Row Pre-Admission Testing from 4/28/2025 in [facility]   Age Calculated Score 0 filed at 04/28/2025 0842   Preoperative SpO2 0 filed at 04/28/2025 0842   Respiratory infection in the last month Either upper or lower (i.e., URI, bronchitis, pneumonia), with fever and antibiotic treatment 0 filed at  04/28/2025 0842   Preoperative anemia (Hgb less than 10 g/dl) 0 filed at 04/28/2025 0842   Surgical incision  0 filed at 04/28/2025 0842   Duration of surgery  0 filed at 04/28/2025 0842   Emergency Procedure  0 filed at 04/28/2025 0842   ARISCAT Total Score  0 filed at 04/28/2025 0842          Fidelia Perioperative Risk for Myocardial Infarction or Cardiac Arrest (YUKI)      Flowsheet Row Pre-Admission Testing from 4/28/2025 in [facility]   Calculated Age Score 0.64 filed at 04/28/2025 0842   Functional Status  0 filed at 04/28/2025 0842   ASA Class  -5.17 filed at 04/28/2025 0842   Creatinine 0 filed at 04/28/2025 0842   Type of Procedure  -0.25 filed at 04/28/2025 0842   YUKI Total Score  -10.03 filed at 04/28/2025 0842   YUKI % 0 filed at 04/28/2025 0842                Assessment and Plan:     HPI 29 y/o female scheduled for gastric bypass; hernia repair on 5/12/2025 with  Dr. Salmon secondary to hiatal hernia with GERD.  PMHX includes GERD, esophageal dysphagia s/p gastric sleeve .  PAT is consulted today for perioperative risk stratification and optimization.    Neuro:  No neurologic diagnosis or significant findings on chart review, clinical presentation and evaluation.  No grossly apparent neurologic perioperative risk.    Patient is not at increased risk for perioperative CVA      HEENT:  No HEENT diagnosis or significant findings on chart review or clinical presentation and evaluation. No further preoperative testing/intervention indicated at this time.    Cardiovascular:  Seen by Dr. Granado 1/2024 for bariatric surgery  Low risk   Follow up  PRN  METS: 9.9  RCRI: 0 points, 3.9%  risk for postoperative MACE     Pulmonary:  No pulmonary diagnosis or significant findings on chart review or clinical presentation.  No further preoperative testing is indicated at this time.  Stop Bang score is 0 placing patient at low risk for LUCY  PRODIGY: Low risk for opioid induced respiratory  depression      Renal:   No renal diagnosis or significant findings on chart review, clinical presentation or evaluation. No further preoperative testing/intervention is indicated at this time.      Endocrine:  No endocrine diagnosis or significant findings on chart review or clinical presentation and evaluation. No further testing or intervention is indicated at this time.    Hematologic:  No hematologic diagnosis, however patient is at an increased risk for DVT  Caprini Score 3, patient at Low risk for perioperative DVT.  Patient provided with VTE education/handout.    Gastrointestinal:   Seen by Dr. Salmon on 3/14/2025 for GERD and s/p Lap sleeve gastrectomy  Plan for revision of sleeve gastrectomy   Apfel 3    Infectious disease:   No infectious diagnosis or significant findings on chart review or clinical presentation and evaluation.       Musculoskeletal:   No diagnosis or significant findings on chart review or clinical presentation and evaluation.     Anesthesia/Airway:  No anesthesia complications      Medication instructions and NPO guidelines reviewed with the patient.  All questions or concerns discussed and addressed.      Labs ordered                  [1]   Past Medical History:  Diagnosis Date    Anxiety     Bariatric surgery status 06/2024    S/P laparoscopic sleeve gastrectomy    Class 1 obesity with body mass index (BMI) of 33.0 to 33.9 in adult 12/06/2024    Class 3 severe obesity with body mass index (BMI) of 40.0 to 44.9 in adult 07/30/2024    42.32 kg/m²    Cystic fibrosis carrier     Encounter for pre-operative cardiovascular clearance 01/05/2024    Alexander Granado MD for Bariatric Procedure    Furuncle of umbilicus     Hiatal hernia with GERD and esophagitis     Insomnia     Morbid obesity with BMI of 45.0-49.9, adult (Multi) 05/24/2024    48.87 kg/m²    PCOS (polycystic ovarian syndrome)     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 [facility]    [place]STERECTOMY      SLEEVE GASTROPLASTY  06/04/2024    [place] [facility] [place]    TUBAL LIGATION      WISDOM TOOTH EXTRACTION     [3]   Family History  Problem Relation Name Age of Onset    Skin cancer Mother      No Known Problems Father      Diabetes Paternal Grandfather Zeferino    [4]   Allergies  Allergen Reactions    Adhesive Rash

## 2025-04-28 NOTE — PREPROCEDURE INSTRUCTIONS
Thank you for visiting Preadmission Testing (PAT) today for your pre-procedure evaluation, you were seen by     Isa Soares CNP  Pre Admission Testing  [facility]  [phone]    This summary includes instructions and information to aid you during your perioperative period.  Please read carefully. If you have any questions about your visit today, please call the number listed above.  If you become ill or have any changes to your health before your surgery, please contact your primary care provider and alert your surgeon.        Preparing for your Surgery       Exercises  Preoperative Deep Breathing Exercises  Why it is important to do deep breathing exercises before my surgery?  Deep breathing exercises strengthen your breathing muscles.  This helps you to recover after your surgery and decreases the chance of breathing complications.  How are the deep breathing exercises done?  Sit straight with your back supported.  Breathe in deeply and slowly through your nose. Your lower rib cage should expand and your abdomen may move forward.  Hold that breath for 3 to 5 seconds.  Breathe out through pursed lips, slowly and completely.  Rest and repeat 10 times every hour while awake.  Rest longer if you become dizzy or lightheaded.       Preoperative Brain Exercises    What are brain exercises?  A brain exercise is any activity that engages your thinking (cognitive) skills.    What types of activities are considered brain exercises?  Jigsaw puzzles, crossword puzzles, word jumble, memory games, word search, and many more.  Many can be found free online or on your phone via a mobile sandro.    Why should I do brain exercises before my surgery?  More recent research has shown brain exercise before surgery can lower the risk of postoperative delirium (confusion) which can be especially important for older adults.  Patients who did brain exercises for 5 to 10 hours the days before surgery, cut their  risk of postoperative delirium in half up to 1 week after surgery.    Sit-to-Stand Exercise    What is the sit-to-stand exercise?  The sit-to-stand exercise strengthens the muscles of your lower body and muscles in the center of your body (core muscles for stability) helping to maintain and improve your strength and mobility.  How do I do the sit-to-stand exercise?  The goal is to do this exercise without using your arms or hands.  If this is too difficult, use your arms and hands or a chair with armrests to help slowly push yourself to the standing position and lower yourself back to the sitting position. As the movement becomes easier use your arms and hands less.    Steps to the sit-to-stand exercise  Sit up tall in a sturdy chair, knees bent, feet flat on the floor shoulder-width apart.  Shift your hips/pelvis forward in the chair to correctly position yourself for the next movement.  Lean forward at your hips.  Stand up straight putting equal weight on both feet.  Check to be sure you are properly aligned with the chair, in a slow controlled movement sit back down.  Repeat this exercise 10-15 times.  If needed you can do it fewer times until your strength improves.  Rest for 1 minute.  Do another 10-15 sit-to-stand exercises.  Try to do this in the morning and evening.        Instructions    Preoperative Fasting Guidelines    Why must I stop eating and drinking near surgery time?  With sedation, food or liquid in your stomach can enter your lungs causing serious complications  Food can increase nausea and vomiting  When do I need to stop eating and drinking before my surgery?     Follow PreOp NPO instructions given to you by Dr. Salmon           Simple things you can do to help prevent blood clots     Blood clots are blockages that can form in the body's veins. When a blood clot forms in your deep veins, it may be called a deep vein thrombosis, or DVT for short. Blood clots can happen in any part of the body  where blood flows, but they are most common in the arms and legs. If a piece of a blood clot breaks free and travels to the lungs, it is called a pulmonary embolus (PE). A PE can be a very serious problem.         Being in the hospital or having surgery can raise your chances of getting a blood clot because you may not be well enough to move around as much as you normally do.         Ways you can help prevent blood clots in the hospital       Wearing SCDs  SCDs stands for Sequential Compression Devices.   SCDs are special sleeves that wrap around your legs. They attach to a pump that fills them with air to gently squeeze your legs every few minutes.  This helps return the blood in your legs to your heart.   SCDs should only be taken off when walking or bathing. SCDs may not be comfortable, but they can help save your life.              Pump SCD leg sleeves  Wearing compression stockings - if your doctor orders them. These special snug-fitting stockings gently squeeze your legs to help blood flow.       Walking. Walking helps move the blood in your legs.   If your doctor says it is ok, try walking the halls at least   5 times a day. Ask us to help you get up, so you don't fall.      Taking any blood-thinning medicines your doctor orders.              Ways you can help prevent blood clots at home         Wearing compression stockings - if your doctor orders them.   Walking - to help move the blood in your legs.    Taking any blood-thinning medicines your doctor orders.      Signs of a blood clot or PE    Tell your doctor or nurse right away if you have any of the problems listed below.         If you are at home, seek medical care right away. Call 911 for chest pain or problems breathing.            Signs of a blood clot (DVT) - such as pain, swelling, redness, or warmth in your arm or legs.  Signs of a pulmonary embolism (PE) - such as chest pain or feeling short of breath      Tobacco and Alcohol;  Do not drink  alcohol or smoke within 24 hours of surgery.  It is best to quit smoking for as long as possible before any surgery or procedure.      The Week before Surgery        Seven days before Surgery  Check your PAT medication instructions  Do the exercises provided to you by PAT  Arrange for a responsible, adult licensed  to take you home after surgery and stay with you for 24 hours.  You will not be permitted to drive yourself home if you have received any anesthetic/sedation  Six days before surgery  Check your PAT medication instructions  Do the exercises provided to you by PAT  Start using Chlorhexidene (CHG) body wash if prescribed  Five days before surgery  Check your PAT medication instructions  Do the exercises provided to you by PAT  Continue to use CHG body wash if prescribed  Three days before surgery  Check your PAT medication instructions  Do the exercises provided to you by PAT  Continue to use CHG body wash if prescribed  Two days before surgery  Check your PAT medication instructions  Do the exercises provided to you by PAT  Continue to use CHG body wash if prescribed    The Day before Surgery       Check your PAT medication and all other PAT instructions including when to stop eating and drinking  You will be called with your arrival time for surgery in the late afternoon.  If you do not receive a call please reach out to [facility] Pre-Op. [phone]  Do not smoke or drink 24 hours before surgery  Prepare items to bring with you to the hospital  Shower with your chlorhexidine wash if prescribed  Brush your teeth and use your chlorhexidine dental rinse if prescribed    The Day of Surgery       Check your PAT medication instructions  Ensure you follow the instructions for when to stop eating and drinking  Shower, if prescribed use CHG.  Do not apply any lotions, creams, moisturizers, perfume or deodorant  Brush your teeth and use your CHG dental rinse if prescribed  Wear loose comfortable  clothing  Avoid make-up  Remove  jewelry and piercings, consider professional piercing removal with a plastic spacer if needed  Bring photo ID and Insurance card  Bring an accurate medication list that includes medication dose, frequency and allergies  Bring a copy of your advanced directives (will, health care power of )  Bring any devices and controllers as well as medical devices you have been provided with for surgery (CPAP, slings, braces, etc.)  Dentures, eyeglasses, and contacts will be removed before surgery, please bring cases for contacts or glasses

## 2025-04-29 ENCOUNTER — APPOINTMENT (OUTPATIENT)
Dept: SURGERY | Facility: CLINIC | Age: 30
End: 2025-04-29
Payer: COMMERCIAL

## 2025-04-29 VITALS — WEIGHT: 150.5 LBS | HEIGHT: 62 IN | BODY MASS INDEX: 27.7 KG/M2

## 2025-04-29 DIAGNOSIS — Z98.84 S/P LAPAROSCOPIC SLEEVE GASTRECTOMY: ICD-10-CM

## 2025-04-29 DIAGNOSIS — R13.19 ESOPHAGEAL DYSPHAGIA: Primary | ICD-10-CM

## 2025-04-29 NOTE — PROGRESS NOTES
"PREOPERATIVE, MULTIDISCIPLINARY, MEDICALLY SUPERVISED, REDUCED CALORIE DIET, BEHAVIOR MODIFICATION AND EXERCISE PROGRAM    S:  The patient is scheduled for revision/gastric bypass surgery on 5/12/25.  Appetite has been low and she has been eating closer to the 2 week pre-op diet to manage the nausea and discomfort.   Diet will consist of a protein shake for breakfast and lunch with option of replacing a Protein shake with a 15 gram protein Greek yogurt for a meal and snack or 1 Ratio yogurt in place of a shake since it is higher in protein.  Patient will continue to have 3-4 oz. lean protein at dinner with vegetables cooked or raw (without added fats) and option of 1 serving of a starch without added fats.  She might consider having a frozen light meal with 15 grams protein and 300 calories for dinner as an alternative.  Drinking 64 ounces of water and calorie free, non-carbonated and decaffeinated fluids daily and walking regularly.    O:      Vitals:    04/29/25 1338   Weight: 68.3 kg (150 lb 8 oz)      Ht:  1.575 m (5' 2\")   BMI:  Body mass index is 27.53 kg/m².     Dietary recommendation:   1. Continue to follow the 2 week pre-op diet.  2. Continue to practice the 30-30-30 rule by drinking between meals.  3. Discontinue taking your MVI 1 week before surgery.  4. The day before surgery, drink clear fluids only.  No shakes and no red dyes.  5. The night before surgery and 2 hours prior to surgery, drink 12 oz. Gatorade or G2 if watching blood sugar, without red dye. (Unless specified otherwise at your pre-admission visit)    A/P: Pt  understands the 2 week pre-op diet.   Reviewed post-op vit/mineral supplements and post-op full liquid diet.    Follow-up 1-2 week post-op.  Patient will email me with any questions.     Amanda Woodson, PENNIE, LD  "

## 2025-05-02 ENCOUNTER — APPOINTMENT (OUTPATIENT)
Dept: SURGERY | Facility: CLINIC | Age: 30
End: 2025-05-02
Payer: COMMERCIAL

## 2025-05-02 VITALS
HEART RATE: 67 BPM | DIASTOLIC BLOOD PRESSURE: 70 MMHG | HEIGHT: 62 IN | OXYGEN SATURATION: 98 % | WEIGHT: 152.7 LBS | BODY MASS INDEX: 28.1 KG/M2 | SYSTOLIC BLOOD PRESSURE: 107 MMHG

## 2025-05-02 DIAGNOSIS — K21.9 HIATAL HERNIA WITH GASTROESOPHAGEAL REFLUX DISEASE WITHOUT ESOPHAGITIS: ICD-10-CM

## 2025-05-02 DIAGNOSIS — K44.9 HIATAL HERNIA WITH GASTROESOPHAGEAL REFLUX DISEASE WITHOUT ESOPHAGITIS: ICD-10-CM

## 2025-05-02 DIAGNOSIS — R13.19 ESOPHAGEAL DYSPHAGIA: Primary | ICD-10-CM

## 2025-05-02 DIAGNOSIS — F41.9 ANXIETY: ICD-10-CM

## 2025-05-02 DIAGNOSIS — Z98.84 S/P LAPAROSCOPIC SLEEVE GASTRECTOMY: ICD-10-CM

## 2025-05-02 DIAGNOSIS — Z98.84 S/P GASTRIC BYPASS: ICD-10-CM

## 2025-05-02 DIAGNOSIS — K21.00 BILE REFLUX ESOPHAGITIS: ICD-10-CM

## 2025-05-02 PROCEDURE — 3008F BODY MASS INDEX DOCD: CPT | Performed by: SURGERY

## 2025-05-02 PROCEDURE — 1036F TOBACCO NON-USER: CPT | Performed by: SURGERY

## 2025-05-02 PROCEDURE — 99214 OFFICE O/P EST MOD 30 MIN: CPT | Performed by: SURGERY

## 2025-05-02 RX ORDER — OXYCODONE HYDROCHLORIDE 5 MG/1
5 TABLET ORAL EVERY 6 HOURS PRN
Qty: 24 TABLET | Refills: 0 | Status: SHIPPED | OUTPATIENT
Start: 2025-05-02 | End: 2025-05-08

## 2025-05-02 RX ORDER — ONDANSETRON 4 MG/1
4 TABLET, ORALLY DISINTEGRATING ORAL EVERY 6 HOURS PRN
Qty: 15 TABLET | Refills: 1 | Status: SHIPPED | OUTPATIENT
Start: 2025-05-02

## 2025-05-02 NOTE — PROGRESS NOTES
BARIATRIC SURGERY PREOPERATIVE VISIT    Date: 05/02/25  Time: 2:19 PM      Name: Erick Becerril    MRN: [id number]    This is a 30 y.o. y.o. female with esophageal dysphagia secondary to hiatal hernia and high pressure sleeve gastrectomy who plans to undergo Revision: lap sleeve revision to RYGB 26573, Hiatal hernia repair 51991, and Robotic-assisted surgery  surgery. They have completed a rigorous preoperative medical work-up and bariatric surgery educational program.     PMH: Problem List[1]    PSH: Surgical History[2]    Social hx:   Social History     Socioeconomic History    Marital status:      Spouse name: Not on file    Number of children: Not on file    Years of education: Not on file    Highest education level: Not on file   Occupational History    Not on file   Tobacco Use    Smoking status: Never     Passive exposure: Never (10/16/24  [facility]    Smokeless tobacco: Never    Tobacco comments:     4/24/25: patient verbalized. MC RN    Vaping Use    Vaping status: Never Used   Substance and Sexual Activity    Alcohol use: Not Currently     Comment: 4/24/25: patient verbalized. MC RN    Drug use: Yes     Types: Benzodiazepines     Comment: 4/24/25: patient verbalized. MC RN    Sexual activity: Yes     Partners: Male     Birth control/protection: Female Sterilization, None     Comment: Hysterectomy   Other Topics Concern    Not on file   Social History Narrative    Not on file     Social Drivers of Health     Financial Resource Strain: Low Risk  (6/5/2024)    Overall Financial Resource Strain (CARDIA)     Difficulty of Paying Living Expenses: Not hard at all   Food Insecurity: Not on file   Transportation Needs: No Transportation Needs (6/5/2024)    PRAPARE - Transportation     Lack of Transportation (Medical): No     Lack of Transportation (Non-Medical): No   Physical Activity: Not on file   Stress: Not on file   Social Connections: Not on file   Intimate Partner Violence: Not on  file   Housing Stability: Low Risk  (6/5/2024)    Housing Stability Vital Sign     Unable to Pay for Housing in the Last Year: No     Number of Places Lived in the Last Year: 1     Unstable Housing in the Last Year: No       Initial weight: 150 lbs  Current weight:   Vitals:    05/02/25 1359   Weight: 69.3 kg (152 lb 11.2 oz)       Preop Clearances:  Cardiac: Cleared      History of Clotting Disorder: none  Anticoagulation plan: na    Other: na    Sleep Study: No apnea    EGD: Findings: as above    Preadmission testing date: 4/28/25    MEDICATIONS:  Prior to Admission Medications:  Current Medications[3]    ALLERGIES:  RX Allergies[4]    REVIEW OF SYSTEMS:  GENERAL: Obese. Negative for malaise, significant weight loss and fever  NECK: Negative for lumps, goiter, pain and significant neck swelling  RESPIRATORY: Negative for cough, wheezing or shortness of breath.  CARDIOVASCULAR: Negative for chest pain, leg swelling or palpitations.  GI: Negative for abdominal discomfort, blood in stools or black stools or change in bowel habits  : No history of dysuria, frequency or incontinence  MUSCULOSKELETAL: Negative for joint pain or swelling, back pain or muscle pain.  SKIN: Negative for lesions, rash, and itching.  PSYCH: Negative for sleep disturbance, mood disorder and recent psychosocial stressors.  ENDOCRINE: Negative for cold or heat intolerance, polyuria, polydipsia and goiter.    PHYSICAL EXAM:  Visit Vitals  /70 (BP Location: Right arm, Patient Position: Sitting, BP Cuff Size: Adult)   Pulse 67       General appearance: obese  Skin: warm, no erythema or rashes  Lungs: clear to percussion and auscultation  Heart: regular rhythm and S1, S2 normal  Abdomen: soft, non-tender, no masses, no organomegaly  Extremities: Normal exam of the extremities. No swelling or pain.    No results found for this or any previous visit (from the past 24 hours).    IMPRESSION:  Erick Becerril is a 30 y.o. y.o. female with esophageal  dysphagia secondary to hiatal hernia and high pressure sleeve gastrectomy.    They have been preoperatively evaluated and deemed to be an appropriate candidate for bariatric surgery.  Surgery Type: Revision: lap revision of sleeve gastrectomy to RYGB 55612, Hiatal hernia repair 05846, and Robotic-assisted surgery     All testing reviewed.  All clearances contained.    PLAN:    VTE risk calculator score of 0.16%, so the patient will require none weeks of extended VTE prophylaxis with Lovenox subcutaneous.    The risks of Revision: lap revision of sleeve gastrectomy to RYGB 14231, Hiatal hernia repair 77931, and Robotic-assisted surgery  including bleeding, leak, wound infection, dehydration, ulcers, internal hernia, DVT/PE, prolonged nausea/vomiting, incomplete resolution of associated medical conditions, reflux, weight regain, vitamin/mineral deficiencies, and death have been explained to the patient and Erick Becerril has expressed understanding and acceptance of them.    The benefits of the above surgery including weight loss, improvement/resolution of associated medical and mental health conditions, improved mobility, and decreased mortality have been explained the the patient and Erick Becerril has expressed understanding and acceptance of them.    Operative and blood transfusion consent forms were signed by the patient and witnessed today.    Prescriptions for all required post-operative home medications were sent to the [facility] pharmacy today and will be delivered to the patient's bedside on the day of discharge.    Further education was provided on day of surgery instructions and what to expect from the inpatient admission after surgery.    Nacho Salmon MD  Bariatric and Minimally Invasive General Surgery         [1]   Patient Active Problem List  Diagnosis    Cystic fibrosis carrier    Hand, foot and mouth disease    Low TSH level    Wound infection    Ankle weakness    S/P laparoscopic  sleeve gastrectomy    Class 3 severe obesity due to excess calories with body mass index (BMI) of 45.0 to 49.9 in adult    Furuncle of umbilicus    Gait abnormality    Hypersomnia with sleep apnea    Inadequate sleep hygiene    Injury of peroneal tendon of right foot    Other insomnia not due to a substance or known physiological condition    Posterior tibial tendinitis of right lower extremity    Restless leg syndrome    Seasonal allergies    Sprain of anterior talofibular ligament of right ankle    Ankle impingement syndrome, right    Anemia    Preoperative clearance    Decreased range of motion of right ankle    Well woman exam with routine gynecological exam    Pelvic pain in female    Dehydration    Morbid obesity due to excess calories (Multi)    Hiatal hernia    BMI 40.0-44.9, adult (Multi)    Intestinal malabsorption following gastrectomy ([facility])    Obesity, Class II, BMI 35-39.9    BMI 33.0-33.9,adult    Esophageal dysphagia    Anxiety    Bile reflux esophagitis    Hiatal hernia with gastroesophageal reflux disease without esophagitis   [2]   Past Surgical History:  Procedure Laterality Date    ANKLE ARTHROPLASTY      ANKLE ARTHROSCOPY W/ OPEN REPAIR Right 11/13/2023    right ankle arthroscopy with ligament repair    CHOLECYSTECTOMY      ESOPHAGOGASTRODUODENOSCOPY  12/17/2024    HIATAL HERNIA REPAIR  06/04/2024    Lap w/Sleeve by Dr. Salmon @ [facility]    HYSTERECTOMY      SLEEVE GASTROPLASTY  06/04/2024    Lap plus HHR w/Dr. Salmon at [facility]    TUBAL LIGATION      WISDOM TOOTH EXTRACTION     [3]   Current Outpatient Medications:     acetaminophen (Tylenol) 325 mg tablet, Take 2 tablets (650 mg) by mouth every 6 hours. 10/16/24  Pt verbalizes taking PRN [facility] LPN (Patient not taking: Reported on 4/28/2025), Disp: , Rfl:     ALPRAZolam (Xanax) 0.5 mg tablet, take 1 to 2 tablets by mouth once daily if needed for anxiety, Disp: , Rfl:     cholecalciferol (Vitamin D3) 50 MCG (2000 UT) tablet, Take 1 tablet (2,000 Units)  by mouth once daily., Disp: , Rfl:     FLUoxetine (PROzac) 20 mg capsule, Take 1 capsule (20 mg) by mouth once daily., Disp: , Rfl:     multivitamin-min-iron-FA-vit K 45 mg iron- 800 mcg-120 mcg capsule, Take 1 tablet by mouth 2 times a day., Disp: , Rfl:     UNABLE TO FIND, Take 1 tablet by mouth once daily. Med Name: Baritastic Calcium Supplement, Disp: , Rfl:     omeprazole (PriLOSEC) 40 mg DR capsule, Take 1 capsule (40 mg) by mouth 2 times a day. Do not crush or chew., Disp: 30 capsule, Rfl: 1    ondansetron ODT (Zofran-ODT) 4 mg disintegrating tablet, Dissolve 1 tablet (4 mg) in the mouth every 6 hours if needed for nausea or vomiting., Disp: 15 tablet, Rfl: 1    oxyCODONE (Roxicodone) 5 mg immediate release tablet, Take 1 tablet (5 mg) by mouth every 6 hours if needed for severe pain (7 - 10) or moderate pain (4 - 6) for up to 6 days., Disp: 24 tablet, Rfl: 0  [4]   Allergies  Allergen Reactions    Adhesive Rash

## 2025-05-02 NOTE — H&P (VIEW-ONLY)
BARIATRIC SURGERY PREOPERATIVE VISIT    Date: 05/02/25  Time: 2:19 PM      Name: Erick Becerril    MRN: [id number]    This is a 30 y.o. y.o. female with esophageal dysphagia secondary to hiatal hernia and high pressure sleeve gastrectomy who plans to undergo Revision: lap sleeve revision to RYGB 57480, Hiatal hernia repair 47051, and Robotic-assisted surgery  surgery. They have completed a rigorous preoperative medical work-up and bariatric surgery educational program.     PMH: Problem List[1]    PSH: Surgical History[2]    Social hx:   Social History     Socioeconomic History    Marital status:      Spouse name: Not on file    Number of children: Not on file    Years of education: Not on file    Highest education level: Not on file   Occupational History    Not on file   Tobacco Use    Smoking status: Never     Passive exposure: Never (10/16/24  [facility]    Smokeless tobacco: Never    Tobacco comments:     4/24/25: patient verbalized. MC RN    Vaping Use    Vaping status: Never Used   Substance and Sexual Activity    Alcohol use: Not Currently     Comment: 4/24/25: patient verbalized. MC RN    Drug use: Yes     Types: Benzodiazepines     Comment: 4/24/25: patient verbalized. MC RN    Sexual activity: Yes     Partners: Male     Birth control/protection: Female Sterilization, None     Comment: Hysterectomy   Other Topics Concern    Not on file   Social History Narrative    Not on file     Social Drivers of Health     Financial Resource Strain: Low Risk  (6/5/2024)    Overall Financial Resource Strain (CARDIA)     Difficulty of Paying Living Expenses: Not hard at all   Food Insecurity: Not on file   Transportation Needs: No Transportation Needs (6/5/2024)    PRAPARE - Transportation     Lack of Transportation (Medical): No     Lack of Transportation (Non-Medical): No   Physical Activity: Not on file   Stress: Not on file   Social Connections: Not on file   Intimate Partner Violence: Not on  file   Housing Stability: Low Risk  (6/5/2024)    Housing Stability Vital Sign     Unable to Pay for Housing in the Last Year: No     Number of Places Lived in the Last Year: 1     Unstable Housing in the Last Year: No       Initial weight: 150 lbs  Current weight:   Vitals:    05/02/25 1359   Weight: 69.3 kg (152 lb 11.2 oz)       Preop Clearances:  Cardiac: Cleared      History of Clotting Disorder: none  Anticoagulation plan: na    Other: na    Sleep Study: No apnea    EGD: Findings: as above    Preadmission testing date: 4/28/25    MEDICATIONS:  Prior to Admission Medications:  Current Medications[3]    ALLERGIES:  RX Allergies[4]    REVIEW OF SYSTEMS:  GENERAL: Obese. Negative for malaise, significant weight loss and fever  NECK: Negative for lumps, goiter, pain and significant neck swelling  RESPIRATORY: Negative for cough, wheezing or shortness of breath.  CARDIOVASCULAR: Negative for chest pain, leg swelling or palpitations.  GI: Negative for abdominal discomfort, blood in stools or black stools or change in bowel habits  : No history of dysuria, frequency or incontinence  MUSCULOSKELETAL: Negative for joint pain or swelling, back pain or muscle pain.  SKIN: Negative for lesions, rash, and itching.  PSYCH: Negative for sleep disturbance, mood disorder and recent psychosocial stressors.  ENDOCRINE: Negative for cold or heat intolerance, polyuria, polydipsia and goiter.    PHYSICAL EXAM:  Visit Vitals  /70 (BP Location: Right arm, Patient Position: Sitting, BP Cuff Size: Adult)   Pulse 67       General appearance: obese  Skin: warm, no erythema or rashes  Lungs: clear to percussion and auscultation  Heart: regular rhythm and S1, S2 normal  Abdomen: soft, non-tender, no masses, no organomegaly  Extremities: Normal exam of the extremities. No swelling or pain.    No results found for this or any previous visit (from the past 24 hours).    IMPRESSION:  Erick Becerril is a 30 y.o. y.o. female with esophageal  dysphagia secondary to hiatal hernia and high pressure sleeve gastrectomy.    They have been preoperatively evaluated and deemed to be an appropriate candidate for bariatric surgery.  Surgery Type: Revision: lap revision of sleeve gastrectomy to RYGB 36593, Hiatal hernia repair 81743, and Robotic-assisted surgery     All testing reviewed.  All clearances contained.    PLAN:    VTE risk calculator score of 0.16%, so the patient will require none weeks of extended VTE prophylaxis with Lovenox subcutaneous.    The risks of Revision: lap revision of sleeve gastrectomy to RYGB 16036, Hiatal hernia repair 94819, and Robotic-assisted surgery  including bleeding, leak, wound infection, dehydration, ulcers, internal hernia, DVT/PE, prolonged nausea/vomiting, incomplete resolution of associated medical conditions, reflux, weight regain, vitamin/mineral deficiencies, and death have been explained to the patient and Erick Becerril has expressed understanding and acceptance of them.    The benefits of the above surgery including weight loss, improvement/resolution of associated medical and mental health conditions, improved mobility, and decreased mortality have been explained the the patient and Erick Becerril has expressed understanding and acceptance of them.    Operative and blood transfusion consent forms were signed by the patient and witnessed today.    Prescriptions for all required post-operative home medications were sent to the [facility] pharmacy today and will be delivered to the patient's bedside on the day of discharge.    Further education was provided on day of surgery instructions and what to expect from the inpatient admission after surgery.    Nacho Salmon MD  Bariatric and Minimally Invasive General Surgery         [1]   Patient Active Problem List  Diagnosis    Cystic fibrosis carrier    Hand, foot and mouth disease    Low TSH level    Wound infection    Ankle weakness    S/P laparoscopic  sleeve gastrectomy    Class 3 severe obesity due to excess calories with body mass index (BMI) of 45.0 to 49.9 in adult    Furuncle of umbilicus    Gait abnormality    Hypersomnia with sleep apnea    Inadequate sleep hygiene    Injury of peroneal tendon of right foot    Other insomnia not due to a substance or known physiological condition    Posterior tibial tendinitis of right lower extremity    Restless leg syndrome    Seasonal allergies    Sprain of anterior talofibular ligament of right ankle    Ankle impingement syndrome, right    Anemia    Preoperative clearance    Decreased range of motion of right ankle    Well woman exam with routine gynecological exam    Pelvic pain in female    Dehydration    Morbid obesity due to excess calories (Multi)    Hiatal hernia    BMI 40.0-44.9, adult (Multi)    Intestinal malabsorption following gastrectomy ([facility])    Obesity, Class II, BMI 35-39.9    BMI 33.0-33.9,adult    Esophageal dysphagia    Anxiety    Bile reflux esophagitis    Hiatal hernia with gastroesophageal reflux disease without esophagitis   [2]   Past Surgical History:  Procedure Laterality Date    ANKLE ARTHROPLASTY      ANKLE ARTHROSCOPY W/ OPEN REPAIR Right 11/13/2023    right ankle arthroscopy with ligament repair    CHOLECYSTECTOMY      ESOPHAGOGASTRODUODENOSCOPY  12/17/2024    HIATAL HERNIA REPAIR  06/04/2024    Lap w/Sleeve by Dr. Salmon [place] [facility]    HYSTERECTOMY      SLEEVE GASTROPLASTY  06/04/2024    Lap plus HHR w/Dr. Salmon at [facility]    TUBAL LIGATION      WISDOM TOOTH EXTRACTION     [3]   Current Outpatient Medications:     acetaminophen (Tylenol) 325 mg tablet, Take 2 tablets (650 mg) by mouth every 6 hours. 10/16/24  Pt verbalizes taking PRN [facility] LPN (Patient not taking: Reported on 4/28/2025), Disp: , Rfl:     ALPRAZolam (Xanax) 0.5 mg tablet, take 1 to 2 tablets by mouth once daily if needed for anxiety, Disp: , Rfl:     cholecalciferol (Vitamin D3) 50 MCG (2000 UT) tablet, Take 1 tablet (2,000 Units)  by mouth once daily., Disp: , Rfl:     FLUoxetine (PROzac) 20 mg capsule, Take 1 capsule (20 mg) by mouth once daily., Disp: , Rfl:     multivitamin-min-iron-FA-vit K 45 mg iron- 800 mcg-120 mcg capsule, Take 1 tablet by mouth 2 times a day., Disp: , Rfl:     UNABLE TO FIND, Take 1 tablet by mouth once daily. Med Name: Baritastic Calcium Supplement, Disp: , Rfl:     omeprazole (PriLOSEC) 40 mg DR capsule, Take 1 capsule (40 mg) by mouth 2 times a day. Do not crush or chew., Disp: 30 capsule, Rfl: 1    ondansetron ODT (Zofran-ODT) 4 mg disintegrating tablet, Dissolve 1 tablet (4 mg) in the mouth every 6 hours if needed for nausea or vomiting., Disp: 15 tablet, Rfl: 1    oxyCODONE (Roxicodone) 5 mg immediate release tablet, Take 1 tablet (5 mg) by mouth every 6 hours if needed for severe pain (7 - 10) or moderate pain (4 - 6) for up to 6 days., Disp: 24 tablet, Rfl: 0  [4]   Allergies  Allergen Reactions    Adhesive Rash

## 2025-05-08 ENCOUNTER — ANESTHESIA EVENT (OUTPATIENT)
Dept: OPERATING ROOM | Facility: HOSPITAL | Age: 30
End: 2025-05-08
Payer: COMMERCIAL

## 2025-05-08 PROCEDURE — RXMED WILLOW AMBULATORY MEDICATION CHARGE

## 2025-05-12 ENCOUNTER — APPOINTMENT (OUTPATIENT)
Dept: RADIOLOGY | Facility: HOSPITAL | Age: 30
End: 2025-05-12
Payer: COMMERCIAL

## 2025-05-12 ENCOUNTER — HOSPITAL ENCOUNTER (INPATIENT)
Facility: HOSPITAL | Age: 30
LOS: 1 days | Discharge: HOME | End: 2025-05-13
Attending: SURGERY | Admitting: SURGERY
Payer: COMMERCIAL

## 2025-05-12 ENCOUNTER — ANESTHESIA (OUTPATIENT)
Dept: OPERATING ROOM | Facility: HOSPITAL | Age: 30
End: 2025-05-12
Payer: COMMERCIAL

## 2025-05-12 DIAGNOSIS — Z98.84 S/P LAPAROSCOPIC SLEEVE GASTRECTOMY: ICD-10-CM

## 2025-05-12 DIAGNOSIS — K21.00 BILE REFLUX ESOPHAGITIS: Primary | ICD-10-CM

## 2025-05-12 DIAGNOSIS — K44.9 HIATAL HERNIA WITH GASTROESOPHAGEAL REFLUX DISEASE WITHOUT ESOPHAGITIS: ICD-10-CM

## 2025-05-12 DIAGNOSIS — K21.9 HIATAL HERNIA WITH GASTROESOPHAGEAL REFLUX DISEASE WITHOUT ESOPHAGITIS: ICD-10-CM

## 2025-05-12 PROCEDURE — 3700000002 HC GENERAL ANESTHESIA TIME - EACH INCREMENTAL 1 MINUTE: Performed by: SURGERY

## 2025-05-12 PROCEDURE — 2780000003 HC OR 278 NO HCPCS: Performed by: SURGERY

## 2025-05-12 PROCEDURE — 96372 THER/PROPH/DIAG INJ SC/IM: CPT | Performed by: SURGERY

## 2025-05-12 PROCEDURE — 7100000001 HC RECOVERY ROOM TIME - INITIAL BASE CHARGE: Performed by: SURGERY

## 2025-05-12 PROCEDURE — 3600000017 HC OR TIME - EACH INCREMENTAL 1 MINUTE - PROCEDURE LEVEL SIX: Performed by: SURGERY

## 2025-05-12 PROCEDURE — A43644 PR LAP GASTRIC BYPASS/ROUX-EN-Y: Performed by: NURSE ANESTHETIST, CERTIFIED REGISTERED

## 2025-05-12 PROCEDURE — 2720000007 HC OR 272 NO HCPCS: Performed by: SURGERY

## 2025-05-12 PROCEDURE — 0DB64Z3 EXCISION OF STOMACH, PERCUTANEOUS ENDOSCOPIC APPROACH, VERTICAL: ICD-10-PCS | Performed by: SURGERY

## 2025-05-12 PROCEDURE — 0D164ZA BYPASS STOMACH TO JEJUNUM, PERCUTANEOUS ENDOSCOPIC APPROACH: ICD-10-PCS | Performed by: SURGERY

## 2025-05-12 PROCEDURE — 0BUT4JZ SUPPLEMENT DIAPHRAGM WITH SYNTHETIC SUBSTITUTE, PERCUTANEOUS ENDOSCOPIC APPROACH: ICD-10-PCS | Performed by: SURGERY

## 2025-05-12 PROCEDURE — 9420000001 HC RT PATIENT EDUCATION 5 MIN

## 2025-05-12 PROCEDURE — 3600000018 HC OR TIME - INITIAL BASE CHARGE - PROCEDURE LEVEL SIX: Performed by: SURGERY

## 2025-05-12 PROCEDURE — 2500000004 HC RX 250 GENERAL PHARMACY W/ HCPCS (ALT 636 FOR OP/ED): Performed by: NURSE ANESTHETIST, CERTIFIED REGISTERED

## 2025-05-12 PROCEDURE — 2500000001 HC RX 250 WO HCPCS SELF ADMINISTERED DRUGS (ALT 637 FOR MEDICARE OP): Performed by: SURGERY

## 2025-05-12 PROCEDURE — 8E0W4CZ ROBOTIC ASSISTED PROCEDURE OF TRUNK REGION, PERCUTANEOUS ENDOSCOPIC APPROACH: ICD-10-PCS | Performed by: SURGERY

## 2025-05-12 PROCEDURE — 43282 LAP PARAESOPH HER RPR W/MESH: CPT | Performed by: SURGERY

## 2025-05-12 PROCEDURE — 1200000002 HC GENERAL ROOM WITH TELEMETRY DAILY

## 2025-05-12 PROCEDURE — 3700000001 HC GENERAL ANESTHESIA TIME - INITIAL BASE CHARGE: Performed by: SURGERY

## 2025-05-12 PROCEDURE — 43659 UNLISTED LAPS PX STOMACH: CPT | Performed by: SURGERY

## 2025-05-12 PROCEDURE — 71045 X-RAY EXAM CHEST 1 VIEW: CPT

## 2025-05-12 PROCEDURE — A43644 PR LAP GASTRIC BYPASS/ROUX-EN-Y: Performed by: STUDENT IN AN ORGANIZED HEALTH CARE EDUCATION/TRAINING PROGRAM

## 2025-05-12 PROCEDURE — 3E0T3BZ INTRODUCTION OF ANESTHETIC AGENT INTO PERIPHERAL NERVES AND PLEXI, PERCUTANEOUS APPROACH: ICD-10-PCS | Performed by: SURGERY

## 2025-05-12 PROCEDURE — 7100000002 HC RECOVERY ROOM TIME - EACH INCREMENTAL 1 MINUTE: Performed by: SURGERY

## 2025-05-12 PROCEDURE — 2500000005 HC RX 250 GENERAL PHARMACY W/O HCPCS: Performed by: NURSE ANESTHETIST, CERTIFIED REGISTERED

## 2025-05-12 PROCEDURE — 2500000004 HC RX 250 GENERAL PHARMACY W/ HCPCS (ALT 636 FOR OP/ED): Performed by: SURGERY

## 2025-05-12 PROCEDURE — 2500000004 HC RX 250 GENERAL PHARMACY W/ HCPCS (ALT 636 FOR OP/ED): Mod: JZ | Performed by: ANESTHESIOLOGY

## 2025-05-12 PROCEDURE — 2500000005 HC RX 250 GENERAL PHARMACY W/O HCPCS: Performed by: SURGERY

## 2025-05-12 PROCEDURE — 94760 N-INVAS EAR/PLS OXIMETRY 1: CPT

## 2025-05-12 PROCEDURE — C1781 MESH (IMPLANTABLE): HCPCS | Performed by: SURGERY

## 2025-05-12 PROCEDURE — 71045 X-RAY EXAM CHEST 1 VIEW: CPT | Performed by: RADIOLOGY

## 2025-05-12 PROCEDURE — 2500000002 HC RX 250 W HCPCS SELF ADMINISTERED DRUGS (ALT 637 FOR MEDICARE OP, ALT 636 FOR OP/ED): Performed by: SURGERY

## 2025-05-12 DEVICE — BIO-A TISSUE REINFORCEMENT 7CMX10CM
Type: IMPLANTABLE DEVICE | Site: ESOPHAGUS | Status: FUNCTIONAL
Brand: GORE BIO-A TISSUE REINFORCEMENT

## 2025-05-12 RX ORDER — NALOXONE HYDROCHLORIDE 0.4 MG/ML
0.2 INJECTION, SOLUTION INTRAMUSCULAR; INTRAVENOUS; SUBCUTANEOUS EVERY 5 MIN PRN
Status: DISCONTINUED | OUTPATIENT
Start: 2025-05-12 | End: 2025-05-13 | Stop reason: HOSPADM

## 2025-05-12 RX ORDER — FENTANYL CITRATE 50 UG/ML
INJECTION, SOLUTION INTRAMUSCULAR; INTRAVENOUS AS NEEDED
Status: DISCONTINUED | OUTPATIENT
Start: 2025-05-12 | End: 2025-05-12

## 2025-05-12 RX ORDER — ALPRAZOLAM 0.5 MG/1
0.5 TABLET ORAL 2 TIMES DAILY PRN
Status: DISCONTINUED | OUTPATIENT
Start: 2025-05-12 | End: 2025-05-13 | Stop reason: HOSPADM

## 2025-05-12 RX ORDER — KETOROLAC TROMETHAMINE 15 MG/ML
15 INJECTION, SOLUTION INTRAMUSCULAR; INTRAVENOUS EVERY 6 HOURS SCHEDULED
Status: DISCONTINUED | OUTPATIENT
Start: 2025-05-12 | End: 2025-05-13 | Stop reason: HOSPADM

## 2025-05-12 RX ORDER — ROCURONIUM BROMIDE 10 MG/ML
INJECTION, SOLUTION INTRAVENOUS AS NEEDED
Status: DISCONTINUED | OUTPATIENT
Start: 2025-05-12 | End: 2025-05-12

## 2025-05-12 RX ORDER — ONDANSETRON HYDROCHLORIDE 2 MG/ML
4 INJECTION, SOLUTION INTRAVENOUS ONCE
Status: DISCONTINUED | OUTPATIENT
Start: 2025-05-12 | End: 2025-05-12 | Stop reason: HOSPADM

## 2025-05-12 RX ORDER — OXYCODONE HYDROCHLORIDE 10 MG/1
10 TABLET ORAL EVERY 4 HOURS PRN
Status: DISCONTINUED | OUTPATIENT
Start: 2025-05-12 | End: 2025-05-13 | Stop reason: HOSPADM

## 2025-05-12 RX ORDER — FLUOXETINE HYDROCHLORIDE 20 MG/1
20 CAPSULE ORAL DAILY
Status: DISCONTINUED | OUTPATIENT
Start: 2025-05-13 | End: 2025-05-13 | Stop reason: HOSPADM

## 2025-05-12 RX ORDER — ALBUTEROL SULFATE 0.83 MG/ML
2.5 SOLUTION RESPIRATORY (INHALATION) ONCE AS NEEDED
Status: DISCONTINUED | OUTPATIENT
Start: 2025-05-12 | End: 2025-05-12 | Stop reason: HOSPADM

## 2025-05-12 RX ORDER — SODIUM CHLORIDE AND POTASSIUM CHLORIDE 150; 900 MG/100ML; MG/100ML
75 INJECTION, SOLUTION INTRAVENOUS CONTINUOUS
Status: DISCONTINUED | OUTPATIENT
Start: 2025-05-12 | End: 2025-05-13 | Stop reason: HOSPADM

## 2025-05-12 RX ORDER — PHENYLEPHRINE HCL IN 0.9% NACL 0.4MG/10ML
SYRINGE (ML) INTRAVENOUS AS NEEDED
Status: DISCONTINUED | OUTPATIENT
Start: 2025-05-12 | End: 2025-05-12

## 2025-05-12 RX ORDER — MIDAZOLAM HYDROCHLORIDE 1 MG/ML
INJECTION, SOLUTION INTRAMUSCULAR; INTRAVENOUS AS NEEDED
Status: DISCONTINUED | OUTPATIENT
Start: 2025-05-12 | End: 2025-05-12

## 2025-05-12 RX ORDER — PROCHLORPERAZINE EDISYLATE 5 MG/ML
10 INJECTION INTRAMUSCULAR; INTRAVENOUS EVERY 6 HOURS PRN
Status: DISCONTINUED | OUTPATIENT
Start: 2025-05-12 | End: 2025-05-13 | Stop reason: HOSPADM

## 2025-05-12 RX ORDER — CEFAZOLIN SODIUM 2 G/100ML
2 INJECTION, SOLUTION INTRAVENOUS ONCE
Status: DISCONTINUED | OUTPATIENT
Start: 2025-05-12 | End: 2025-05-12 | Stop reason: HOSPADM

## 2025-05-12 RX ORDER — APREPITANT 40 MG/1
40 CAPSULE ORAL ONCE
Status: COMPLETED | OUTPATIENT
Start: 2025-05-12 | End: 2025-05-12

## 2025-05-12 RX ORDER — ACETAMINOPHEN 325 MG/1
650 TABLET ORAL EVERY 4 HOURS PRN
Status: DISCONTINUED | OUTPATIENT
Start: 2025-05-12 | End: 2025-05-12 | Stop reason: HOSPADM

## 2025-05-12 RX ORDER — ACETAMINOPHEN 325 MG/1
650 TABLET ORAL EVERY 6 HOURS
Status: DISCONTINUED | OUTPATIENT
Start: 2025-05-12 | End: 2025-05-13 | Stop reason: HOSPADM

## 2025-05-12 RX ORDER — ESOMEPRAZOLE MAGNESIUM 40 MG/1
40 GRANULE, DELAYED RELEASE ORAL
Status: DISCONTINUED | OUTPATIENT
Start: 2025-05-13 | End: 2025-05-13 | Stop reason: HOSPADM

## 2025-05-12 RX ORDER — SIMETHICONE 80 MG
80 TABLET,CHEWABLE ORAL EVERY 4 HOURS PRN
Status: DISCONTINUED | OUTPATIENT
Start: 2025-05-12 | End: 2025-05-13 | Stop reason: HOSPADM

## 2025-05-12 RX ORDER — LIDOCAINE HCL/PF 100 MG/5ML
SYRINGE (ML) INTRAVENOUS AS NEEDED
Status: DISCONTINUED | OUTPATIENT
Start: 2025-05-12 | End: 2025-05-12

## 2025-05-12 RX ORDER — SCOPOLAMINE 1 MG/3D
1 PATCH, EXTENDED RELEASE TRANSDERMAL ONCE
Status: DISCONTINUED | OUTPATIENT
Start: 2025-05-12 | End: 2025-05-13 | Stop reason: HOSPADM

## 2025-05-12 RX ORDER — PROCHLORPERAZINE 25 MG/1
25 SUPPOSITORY RECTAL EVERY 12 HOURS PRN
Status: DISCONTINUED | OUTPATIENT
Start: 2025-05-12 | End: 2025-05-13 | Stop reason: HOSPADM

## 2025-05-12 RX ORDER — HEPARIN SODIUM 5000 [USP'U]/ML
5000 INJECTION, SOLUTION INTRAVENOUS; SUBCUTANEOUS ONCE
Status: COMPLETED | OUTPATIENT
Start: 2025-05-12 | End: 2025-05-12

## 2025-05-12 RX ORDER — PROPOFOL 10 MG/ML
INJECTION, EMULSION INTRAVENOUS AS NEEDED
Status: DISCONTINUED | OUTPATIENT
Start: 2025-05-12 | End: 2025-05-12

## 2025-05-12 RX ORDER — PANTOPRAZOLE SODIUM 40 MG/1
40 TABLET, DELAYED RELEASE ORAL
Status: DISCONTINUED | OUTPATIENT
Start: 2025-05-13 | End: 2025-05-13 | Stop reason: HOSPADM

## 2025-05-12 RX ORDER — CEFAZOLIN 1 G/1
INJECTION, POWDER, FOR SOLUTION INTRAVENOUS AS NEEDED
Status: DISCONTINUED | OUTPATIENT
Start: 2025-05-12 | End: 2025-05-12

## 2025-05-12 RX ORDER — PANTOPRAZOLE SODIUM 40 MG/10ML
40 INJECTION, POWDER, LYOPHILIZED, FOR SOLUTION INTRAVENOUS
Status: DISCONTINUED | OUTPATIENT
Start: 2025-05-13 | End: 2025-05-13 | Stop reason: HOSPADM

## 2025-05-12 RX ORDER — ONDANSETRON 4 MG/1
4 TABLET, ORALLY DISINTEGRATING ORAL EVERY 6 HOURS
Status: DISCONTINUED | OUTPATIENT
Start: 2025-05-12 | End: 2025-05-13 | Stop reason: HOSPADM

## 2025-05-12 RX ORDER — ONDANSETRON HYDROCHLORIDE 2 MG/ML
INJECTION, SOLUTION INTRAVENOUS AS NEEDED
Status: DISCONTINUED | OUTPATIENT
Start: 2025-05-12 | End: 2025-05-12

## 2025-05-12 RX ORDER — ONDANSETRON HYDROCHLORIDE 2 MG/ML
4 INJECTION, SOLUTION INTRAVENOUS EVERY 6 HOURS
Status: DISCONTINUED | OUTPATIENT
Start: 2025-05-12 | End: 2025-05-13 | Stop reason: HOSPADM

## 2025-05-12 RX ORDER — HYDRALAZINE HYDROCHLORIDE 20 MG/ML
10 INJECTION INTRAMUSCULAR; INTRAVENOUS EVERY 4 HOURS PRN
Status: DISCONTINUED | OUTPATIENT
Start: 2025-05-12 | End: 2025-05-13 | Stop reason: HOSPADM

## 2025-05-12 RX ORDER — CELECOXIB 400 MG/1
400 CAPSULE ORAL ONCE
Status: COMPLETED | OUTPATIENT
Start: 2025-05-12 | End: 2025-05-12

## 2025-05-12 RX ORDER — PROCHLORPERAZINE MALEATE 5 MG
10 TABLET ORAL EVERY 6 HOURS PRN
Status: DISCONTINUED | OUTPATIENT
Start: 2025-05-12 | End: 2025-05-13 | Stop reason: HOSPADM

## 2025-05-12 RX ORDER — NORETHINDRONE AND ETHINYL ESTRADIOL 0.5-0.035
KIT ORAL AS NEEDED
Status: DISCONTINUED | OUTPATIENT
Start: 2025-05-12 | End: 2025-05-12

## 2025-05-12 RX ORDER — SODIUM CHLORIDE, SODIUM LACTATE, POTASSIUM CHLORIDE, CALCIUM CHLORIDE 600; 310; 30; 20 MG/100ML; MG/100ML; MG/100ML; MG/100ML
100 INJECTION, SOLUTION INTRAVENOUS CONTINUOUS
Status: ACTIVE | OUTPATIENT
Start: 2025-05-12 | End: 2025-05-12

## 2025-05-12 RX ORDER — KETOROLAC TROMETHAMINE 30 MG/ML
INJECTION, SOLUTION INTRAMUSCULAR; INTRAVENOUS AS NEEDED
Status: DISCONTINUED | OUTPATIENT
Start: 2025-05-12 | End: 2025-05-12

## 2025-05-12 RX ORDER — ACETAMINOPHEN 325 MG/1
650 TABLET ORAL ONCE
Status: COMPLETED | OUTPATIENT
Start: 2025-05-12 | End: 2025-05-12

## 2025-05-12 RX ORDER — SUCCINYLCHOLINE CHLORIDE 20 MG/ML
INJECTION INTRAMUSCULAR; INTRAVENOUS AS NEEDED
Status: DISCONTINUED | OUTPATIENT
Start: 2025-05-12 | End: 2025-05-12

## 2025-05-12 RX ORDER — GABAPENTIN 300 MG/1
600 CAPSULE ORAL ONCE
Status: COMPLETED | OUTPATIENT
Start: 2025-05-12 | End: 2025-05-12

## 2025-05-12 RX ORDER — OXYCODONE HYDROCHLORIDE 5 MG/1
5 TABLET ORAL EVERY 4 HOURS PRN
Status: DISCONTINUED | OUTPATIENT
Start: 2025-05-12 | End: 2025-05-13 | Stop reason: HOSPADM

## 2025-05-12 RX ADMIN — ROCURONIUM BROMIDE 10 MG: 10 INJECTION, SOLUTION INTRAVENOUS at 09:01

## 2025-05-12 RX ADMIN — SODIUM CHLORIDE, POTASSIUM CHLORIDE, SODIUM LACTATE AND CALCIUM CHLORIDE 100 ML/HR: 600; 310; 30; 20 INJECTION, SOLUTION INTRAVENOUS at 07:21

## 2025-05-12 RX ADMIN — KETOROLAC TROMETHAMINE 15 MG: 15 INJECTION, SOLUTION INTRAMUSCULAR; INTRAVENOUS at 17:06

## 2025-05-12 RX ADMIN — OXYCODONE HYDROCHLORIDE 5 MG: 5 TABLET ORAL at 21:11

## 2025-05-12 RX ADMIN — SCOPOLAMINE 1 PATCH: 1.5 PATCH, EXTENDED RELEASE TRANSDERMAL at 06:54

## 2025-05-12 RX ADMIN — CEFAZOLIN 2 G: 1 INJECTION, POWDER, FOR SOLUTION INTRAMUSCULAR; INTRAVENOUS at 08:01

## 2025-05-12 RX ADMIN — ONDANSETRON 4 MG: 2 INJECTION, SOLUTION INTRAMUSCULAR; INTRAVENOUS at 11:00

## 2025-05-12 RX ADMIN — ACETAMINOPHEN 650 MG: 325 TABLET, FILM COATED ORAL at 21:11

## 2025-05-12 RX ADMIN — DEXAMETHASONE SODIUM PHOSPHATE 4 MG: 4 INJECTION INTRA-ARTICULAR; INTRALESIONAL; INTRAMUSCULAR; INTRAVENOUS; SOFT TISSUE at 08:08

## 2025-05-12 RX ADMIN — FENTANYL CITRATE 50 MCG: 50 INJECTION, SOLUTION INTRAMUSCULAR; INTRAVENOUS at 07:53

## 2025-05-12 RX ADMIN — SUGAMMADEX 200 MG: 100 INJECTION, SOLUTION INTRAVENOUS at 11:32

## 2025-05-12 RX ADMIN — SUCCINYLCHOLINE CHLORIDE 100 MG: 20 INJECTION, SOLUTION INTRAMUSCULAR; INTRAVENOUS at 07:53

## 2025-05-12 RX ADMIN — FENTANYL CITRATE 50 MCG: 50 INJECTION, SOLUTION INTRAMUSCULAR; INTRAVENOUS at 08:17

## 2025-05-12 RX ADMIN — APREPITANT 40 MG: 40 CAPSULE ORAL at 06:51

## 2025-05-12 RX ADMIN — EPHEDRINE SULFATE 15 MG: 50 INJECTION, SOLUTION INTRAVENOUS at 09:04

## 2025-05-12 RX ADMIN — HEPARIN SODIUM 5000 UNITS: 5000 INJECTION, SOLUTION INTRAVENOUS; SUBCUTANEOUS at 06:56

## 2025-05-12 RX ADMIN — LIDOCAINE HYDROCHLORIDE 50 MG: 20 INJECTION INTRAVENOUS at 07:53

## 2025-05-12 RX ADMIN — ROCURONIUM BROMIDE 5 MG: 10 INJECTION, SOLUTION INTRAVENOUS at 07:53

## 2025-05-12 RX ADMIN — Medication 2 L/MIN: at 13:07

## 2025-05-12 RX ADMIN — MIDAZOLAM 2 MG: 1 INJECTION INTRAMUSCULAR; INTRAVENOUS at 07:40

## 2025-05-12 RX ADMIN — ROCURONIUM BROMIDE 10 MG: 10 INJECTION, SOLUTION INTRAVENOUS at 10:41

## 2025-05-12 RX ADMIN — Medication 80 MCG: at 08:44

## 2025-05-12 RX ADMIN — ROCURONIUM BROMIDE 45 MG: 10 INJECTION, SOLUTION INTRAVENOUS at 08:07

## 2025-05-12 RX ADMIN — GABAPENTIN 600 MG: 300 CAPSULE ORAL at 06:52

## 2025-05-12 RX ADMIN — ACETAMINOPHEN 650 MG: 325 TABLET, FILM COATED ORAL at 13:16

## 2025-05-12 RX ADMIN — Medication 160 MCG: at 09:01

## 2025-05-12 RX ADMIN — Medication 120 MCG: at 08:32

## 2025-05-12 RX ADMIN — PROPOFOL 150 MG: 10 INJECTION, EMULSION INTRAVENOUS at 07:53

## 2025-05-12 RX ADMIN — ACETAMINOPHEN 650 MG: 325 TABLET ORAL at 06:49

## 2025-05-12 RX ADMIN — CELECOXIB 400 MG: 400 CAPSULE ORAL at 06:50

## 2025-05-12 RX ADMIN — HYDROMORPHONE HYDROCHLORIDE 0.5 MG: 1 INJECTION, SOLUTION INTRAMUSCULAR; INTRAVENOUS; SUBCUTANEOUS at 12:29

## 2025-05-12 RX ADMIN — KETOROLAC TROMETHAMINE 30 MG: 30 INJECTION, SOLUTION INTRAMUSCULAR at 11:01

## 2025-05-12 RX ADMIN — POTASSIUM CHLORIDE AND SODIUM CHLORIDE 75 ML/HR: 900; 150 INJECTION, SOLUTION INTRAVENOUS at 13:15

## 2025-05-12 SDOH — SOCIAL STABILITY: SOCIAL INSECURITY
WITHIN THE LAST YEAR, HAVE YOU BEEN KICKED, HIT, SLAPPED, OR OTHERWISE PHYSICALLY HURT BY YOUR PARTNER OR EX-PARTNER?: NO

## 2025-05-12 SDOH — SOCIAL STABILITY: SOCIAL INSECURITY: WITHIN THE LAST YEAR, HAVE YOU BEEN AFRAID OF YOUR PARTNER OR EX-PARTNER?: NO

## 2025-05-12 SDOH — SOCIAL STABILITY: SOCIAL INSECURITY: HAS ANYONE EVER THREATENED TO HURT YOUR FAMILY OR YOUR PETS?: NO

## 2025-05-12 SDOH — ECONOMIC STABILITY: FOOD INSECURITY: WITHIN THE PAST 12 MONTHS, THE FOOD YOU BOUGHT JUST DIDN'T LAST AND YOU DIDN'T HAVE MONEY TO GET MORE.: NEVER TRUE

## 2025-05-12 SDOH — ECONOMIC STABILITY: FOOD INSECURITY: WITHIN THE PAST 12 MONTHS, YOU WORRIED THAT YOUR FOOD WOULD RUN OUT BEFORE YOU GOT THE MONEY TO BUY MORE.: NEVER TRUE

## 2025-05-12 SDOH — SOCIAL STABILITY: SOCIAL INSECURITY: WERE YOU ABLE TO COMPLETE ALL THE BEHAVIORAL HEALTH SCREENINGS?: YES

## 2025-05-12 SDOH — SOCIAL STABILITY: SOCIAL INSECURITY: HAVE YOU HAD THOUGHTS OF HARMING ANYONE ELSE?: NO

## 2025-05-12 SDOH — SOCIAL STABILITY: SOCIAL INSECURITY
WITHIN THE LAST YEAR, HAVE YOU BEEN RAPED OR FORCED TO HAVE ANY KIND OF SEXUAL ACTIVITY BY YOUR PARTNER OR EX-PARTNER?: NO

## 2025-05-12 SDOH — SOCIAL STABILITY: SOCIAL INSECURITY: WITHIN THE LAST YEAR, HAVE YOU BEEN HUMILIATED OR EMOTIONALLY ABUSED IN OTHER WAYS BY YOUR PARTNER OR EX-PARTNER?: NO

## 2025-05-12 SDOH — ECONOMIC STABILITY: INCOME INSECURITY: IN THE PAST 12 MONTHS HAS THE ELECTRIC, GAS, OIL, OR WATER COMPANY THREATENED TO SHUT OFF SERVICES IN YOUR HOME?: NO

## 2025-05-12 SDOH — SOCIAL STABILITY: SOCIAL INSECURITY: ARE THERE ANY APPARENT SIGNS OF INJURIES/BEHAVIORS THAT COULD BE RELATED TO ABUSE/NEGLECT?: NO

## 2025-05-12 SDOH — SOCIAL STABILITY: SOCIAL INSECURITY: ARE YOU OR HAVE YOU BEEN THREATENED OR ABUSED PHYSICALLY, EMOTIONALLY, OR SEXUALLY BY ANYONE?: NO

## 2025-05-12 SDOH — SOCIAL STABILITY: SOCIAL INSECURITY: HAVE YOU HAD ANY THOUGHTS OF HARMING ANYONE ELSE?: NO

## 2025-05-12 SDOH — SOCIAL STABILITY: SOCIAL INSECURITY: ABUSE: ADULT

## 2025-05-12 SDOH — SOCIAL STABILITY: SOCIAL INSECURITY: DO YOU FEEL ANYONE HAS EXPLOITED OR TAKEN ADVANTAGE OF YOU FINANCIALLY OR OF YOUR PERSONAL PROPERTY?: NO

## 2025-05-12 SDOH — SOCIAL STABILITY: SOCIAL INSECURITY: DO YOU FEEL UNSAFE GOING BACK TO THE PLACE WHERE YOU ARE LIVING?: NO

## 2025-05-12 SDOH — SOCIAL STABILITY: SOCIAL INSECURITY: DOES ANYONE TRY TO KEEP YOU FROM HAVING/CONTACTING OTHER FRIENDS OR DOING THINGS OUTSIDE YOUR HOME?: NO

## 2025-05-12 ASSESSMENT — LIFESTYLE VARIABLES
SKIP TO QUESTIONS 9-10: 1
HOW OFTEN DO YOU HAVE A DRINK CONTAINING ALCOHOL: NEVER
AUDIT-C TOTAL SCORE: 0
HOW OFTEN DO YOU HAVE 6 OR MORE DRINKS ON ONE OCCASION: NEVER
HOW MANY STANDARD DRINKS CONTAINING ALCOHOL DO YOU HAVE ON A TYPICAL DAY: PATIENT DOES NOT DRINK
AUDIT-C TOTAL SCORE: 0

## 2025-05-12 ASSESSMENT — COGNITIVE AND FUNCTIONAL STATUS - GENERAL
DAILY ACTIVITIY SCORE: 24
DAILY ACTIVITIY SCORE: 24
MOBILITY SCORE: 24
PATIENT BASELINE BEDBOUND: NO
MOBILITY SCORE: 24

## 2025-05-12 ASSESSMENT — PAIN SCALES - GENERAL
PAINLEVEL_OUTOF10: 0 - NO PAIN
PAINLEVEL_OUTOF10: 7
PAINLEVEL_OUTOF10: 4
PAINLEVEL_OUTOF10: 3
PAINLEVEL_OUTOF10: 2
PAINLEVEL_OUTOF10: 2
PAINLEVEL_OUTOF10: 7
PAINLEVEL_OUTOF10: 4

## 2025-05-12 ASSESSMENT — PATIENT HEALTH QUESTIONNAIRE - PHQ9
1. LITTLE INTEREST OR PLEASURE IN DOING THINGS: NOT AT ALL
SUM OF ALL RESPONSES TO PHQ9 QUESTIONS 1 & 2: 0
2. FEELING DOWN, DEPRESSED OR HOPELESS: NOT AT ALL

## 2025-05-12 ASSESSMENT — ACTIVITIES OF DAILY LIVING (ADL)
HEARING - LEFT EAR: FUNCTIONAL
JUDGMENT_ADEQUATE_SAFELY_COMPLETE_DAILY_ACTIVITIES: YES
LACK_OF_TRANSPORTATION: NO
ASSISTIVE_DEVICE: EYEGLASSES
HEARING - RIGHT EAR: FUNCTIONAL
ADEQUATE_TO_COMPLETE_ADL: YES
BATHING: INDEPENDENT
TOILETING: INDEPENDENT
DRESSING YOURSELF: INDEPENDENT
FEEDING YOURSELF: INDEPENDENT
GROOMING: INDEPENDENT
PATIENT'S MEMORY ADEQUATE TO SAFELY COMPLETE DAILY ACTIVITIES?: YES
WALKS IN HOME: INDEPENDENT
LACK_OF_TRANSPORTATION: NO

## 2025-05-12 ASSESSMENT — PAIN - FUNCTIONAL ASSESSMENT
PAIN_FUNCTIONAL_ASSESSMENT: 0-10
PAIN_FUNCTIONAL_ASSESSMENT: UNABLE TO SELF-REPORT

## 2025-05-12 ASSESSMENT — PAIN DESCRIPTION - DESCRIPTORS
DESCRIPTORS: SORE

## 2025-05-12 NOTE — ANESTHESIA PROCEDURE NOTES
Airway  Date/Time: 5/12/2025 7:54 AM  Reason: elective    Airway not difficult    Staffing  Performed: CRNA   Authorized by: Jhon Rodríguez DO    Performed by: JEANINE Oleary-PUSHPA  Patient location during procedure: OR    Patient Condition  Indications for airway management: anesthesia  Patient position: sniffing  MILS maintained throughout  Sedation level: deep     Final Airway Details   Preoxygenated: yes  Final airway type: endotracheal airway  Successful airway: ETT  Cuffed: yes   Successful intubation technique: video laryngoscopy  Endotracheal tube insertion site: oral  Blade: Natalia  Blade size: #3  ETT size (mm): 7.0  Cormack-Lehane Classification: grade I - full view of glottis  Placement verified by: chest auscultation and capnometry   Measured from: teeth  ETT to teeth (cm): 21  Ventilation between attempts: none  Number of attempts at approach: 1  Number of other approaches attempted: 0    Additional Comments  RSI.

## 2025-05-12 NOTE — INTERVAL H&P NOTE
H&P reviewed. The patient was examined and there are no changes to the H&P.    Robotic hiatal hernia repair and revision of sleeve gastrectomy to RYGB today.    Nacho Salmon MD  Bariatric and Minimally Invasive General Surgery

## 2025-05-12 NOTE — BRIEF OP NOTE
Date: 2025  OR Location: [facility] OR    Name: Erick Becerril, : 1995, Age: 30 y.o., MRN: [id number], Sex: female    Diagnosis  Pre-op Diagnosis      * Bile reflux esophagitis [K21.00]     * Hiatal hernia with gastroesophageal reflux disease without esophagitis [K44.9, K21.9] Post-op Diagnosis     * Bile reflux esophagitis [K21.00]     * Hiatal hernia with gastroesophageal reflux disease without esophagitis [K44.9, K21.9]     Procedures  CREATION, GASTRIC BYPASS, ROBOT-ASSISTED  92923 - LA LAPS GSTR RSTCV PX W/BYP JOSE MIGUEL-EN-Y LIMB <150 CM    REPAIR, HERNIA, DIAPHRAGMATIC, ROBOT-ASSISTED  14318 - LA LAPS RPR PARAESPHGL HRNA INCL FUNDPLSTY W/O MESH    Robot assisted Revision of sleeve gastrectomy to jose miguel e y gastric bypass, hiatal hernia repair with gore bio-A mesh, upper endoscopy, transversus abdominus plane block.    Surgeons      * Nacho Salmon - Primary    Resident/Fellow/Other Assistant:  Surgeons and Role:  * No surgeons found with a matching role *  Tabatha    Staff:   Circulator: Mehnaz  Surgical Assistant: Adi  Scrub Person: Dre Art Circulator: Linsey  Scrub Person: Renuka  Surgical Assistant: Carine    Anesthesia Staff: Anesthesiologist: Jhon Rodríguez DO  CRNA: JEANINE Oleary-PUSHPA    Procedure Summary  Anesthesia: General  ASA: II  Estimated Blood Loss: 5 mL  Intra-op Medications:   Administrations occurring from 0705 to 1105 on 25:   Medication Name Total Dose   BUPivacaine HCl (Marcaine) 0.5 % (5 mg/mL) 30 mL, bupivacaine PF 0.25 % (Marcaine) 0.25 % (2.5 mg/mL) 30 mL, dexAMETHasone (Decadron) 4 mg in sodium chloride 0.9% 100 mL syringe 161 mL   ceFAZolin (Ancef) vial 1 g 2 g   dexAMETHasone (Decadron) 4 mg/mL IV Syringe 2 mL 4 mg   ePHEDrine injection 15 mg   fentaNYL (Sublimaze) injection 50 mcg/mL 100 mcg   ketorolac (Toradol) injection 30 mg 30 mg   lidocaine (cardiac) injection 2% prefilled syringe 50 mg   midazolam (Versed) injection 1 mg/mL 2 mg   ondansetron (Zofran) 2 mg/mL  injection 4 mg   phenylephrine 40 mcg/mL syringe 10 mL 360 mcg   propofol (Diprivan) injection 10 mg/mL 150 mg   rocuronium (ZeMuron) 50 mg/5 mL injection 70 mg   succinylcholine (Anectine) 20 mg/mL injection 100 mg   lactated Ringer's infusion 14.97 mL              Anesthesia Record               Intraprocedure I/O Totals          Intake    lactated Ringer's infusion 1000.00 mL    Total Intake 1000 mL       Output    Est. Blood Loss 10 mL    Total Output 10 mL       Net    Net Volume 990 mL          Specimen: No specimens collected               Findings: anterior recurrence of hiatal hernia, normal sleeve anatomy    Complications:  None; patient tolerated the procedure well.     Disposition: PACU - hemodynamically stable.  Condition: stable  Specimens Collected: No specimens collected  Attending Attestation: I was present and scrubbed for the entire procedure.    Nacho Salmon  Phone Number: [phone]

## 2025-05-12 NOTE — ANESTHESIA POSTPROCEDURE EVALUATION
Patient: Erick Becerril    Procedure Summary       Date: 05/12/25 Room / Location: [facility] 08 / [facility]    Anesthesia Start: 0740 Anesthesia Stop: 1150    Procedures:       CREATION, GASTRIC BYPASS, ROBOT-ASSISTED (Abdomen)      REPAIR, HERNIA, DIAPHRAGMATIC, ROBOT-ASSISTED (Abdomen) Diagnosis:       Bile reflux esophagitis      Hiatal hernia with gastroesophageal reflux disease without esophagitis      (Bile reflux esophagitis [K21.00])      (Hiatal hernia with gastroesophageal reflux disease without esophagitis [K44.9, K21.9])    Surgeons: Nacho Salmon MD Responsible Provider: Jhon Rodríguez DO    Anesthesia Type: general ASA Status: 2            Anesthesia Type: general    Vitals Value Taken Time   /63 05/12/25 12:34   Temp 36.5 °C (97.7 °F) 05/12/25 11:43   Pulse 98 05/12/25 12:38   Resp 18 05/12/25 12:28   SpO2 99 % 05/12/25 12:43   Vitals shown include unfiled device data.    Anesthesia Post Evaluation    Patient location during evaluation: PACU  Patient participation: complete - patient participated  Level of consciousness: awake and alert  Pain management: adequate  Airway patency: patent  Cardiovascular status: acceptable  Respiratory status: acceptable  Hydration status: acceptable  Postoperative Nausea and Vomiting: none        No notable events documented.

## 2025-05-12 NOTE — PROGRESS NOTES
05/12/25 1457   Discharge Planning   Living Arrangements Parent   Support Systems Spouse/significant other;Parent;Friends/neighbors   Assistance Needed Patient is A&O X3, on room air at baseline, does not uses a CPAP/BIPAP at home, is not currently active with any community/home care agencies, is independent with ADLs, drives and does not require use of assistive devices for ambulation. Patient denies further needs upon discharge.   Type of Residence Private residence   Number of Stairs to Enter Residence 0   Number of Stairs Within Residence 7   Who is requesting discharge planning? Provider   Home or Post Acute Services None   Expected Discharge Disposition Home   Does the patient need discharge transport arranged? No   Financial Resource Strain   How hard is it for you to pay for the very basics like food, housing, medical care, and heating? Not hard   Housing Stability   In the last 12 months, was there a time when you were not able to pay the mortgage or rent on time? N   At any time in the past 12 months, were you homeless or living in a shelter (including now)? N   Transportation Needs   In the past 12 months, has lack of transportation kept you from medical appointments or from getting medications? no   In the past 12 months, has lack of transportation kept you from meetings, work, or from getting things needed for daily living? No   Stroke Family Assessment   Stroke Family Assessment Needed No   Intensity of Service   Intensity of Service 0-30 min

## 2025-05-12 NOTE — ANESTHESIA PREPROCEDURE EVALUATION
Patient: Erick Becerril    Procedure Information       Date/Time: 05/12/25 0705    Procedures:       CREATION, GASTRIC BYPASS, ROBOT-ASSISTED - revision of sleeve gastrectomy to RYGB      REPAIR, HERNIA, DIAPHRAGMATIC, ROBOT-ASSISTED    Location: [facility] / [facility]    Surgeons: Nacho Salmon MD            Relevant Problems   Neuro   (+) Anxiety      GI   (+) Esophageal dysphagia   (+) Hiatal hernia   (+) Hiatal hernia with gastroesophageal reflux disease without esophagitis      Endocrine   (+) Class 3 severe obesity due to excess calories with body mass index (BMI) of 45.0 to 49.9 in adult   (+) Morbid obesity due to excess calories (Multi)      Hematology   (+) Anemia      HEENT   (+) Seasonal allergies      ID   (+) Furuncle of umbilicus   (+) Hand, foot and mouth disease   (+) Wound infection       Clinical information reviewed:   Tobacco  Allergies  Meds   Med Hx  Surg Hx   Fam Hx  Soc Hx        NPO Detail:  NPO/Void Status  Date of Last Liquid: 05/12/25  Time of Last Liquid: 0400  Date of Last Solid: 05/10/25  Last Intake Type: Clear fluids         Physical Exam    Airway  Mallampati: II  TM distance: >3 FB  Neck ROM: full  Mouth opening: 3 or more finger widths     Cardiovascular - normal exam   Dental    Pulmonary - normal exam   Abdominal            Anesthesia Plan    History of general anesthesia?: yes  History of complications of general anesthesia?: no    ASA 2     general     intravenous induction   Anesthetic plan and risks discussed with patient.  Use of blood products discussed with patient who.    Plan discussed with attending and CRNA.

## 2025-05-12 NOTE — OP NOTE
Operative Report     DATE PERFORMED:  05/12/25     SURGEON:  Nacho Salmon MD    ASSISTANT: Tabatha     PREOPERATIVE DIAGNOSES: Bile reflux esophagitis and dysphagia secondary to high pressure sleeve gastrectomy and recurrent hiatal hernia.    POSTOPERATIVE DIAGNOSES: Bile reflux esophagitis and dysphagia secondary to high pressure sleeve gastrectomy and recurrent hiatal hernia.    PROCEDURE:  Robot assisted laparoscopic revision of sleeve gastrectomy to  Candelario-en-Y gastric bypass, hiatal hernia repair with [place] Bio-A mesh, intraoperative  endoscopy, and transverse abdominis plane block.     COMPLICATIONS:  None.     ESTIMATED BLOOD LOSS: 5 mL     DRAINS:  None.     ANESTHESIA:  General endotracheal.     SPECIMENS:  None.     BRIEF HISTORY:   Erick Becerril is a 30 y.o.-year-old female who underwent an uncomplicated laparoscopic sleeve gastrectomy and hiatal hernia repair in June 2024.  She has had excellent weight loss from that surgery.  However she developed dysphagia and esophageal reflux.  A comprehensive workup was performed including multiple endoscopies, esophageal manometry, and Bravo pH probe.  She was found to have a small hiatal hernia recurrence, high pressure in her sleeve, bile reflux into the stomach but no acid reflux on Bravo.  We attempted Botox at the pylorus to decrease pressure within the gastric sleeve and facilitate gastric emptying.  This did not help her dysphagia at all and in fact made her reflux symptoms worse.      she was  thoroughly evaluated in a multidisciplinary approach and found to be an appropriate candidate  for robot-assisted laparoscopic revision of sleeve gastrectomy to Candelario-en-Y gastric bypass and hiatal hernia repair with mesh surgery.  The risks and  benefits of procedure were explained to the patient and she expressed  understanding and consent.     PROCEDURE IN DETAIL:  On the day of the operation, Erick Becerril was  appropriately identified in the holding area.   she was  then taken back  to the operating room, placed in a supine position on the operating room  table.   she was given  appropriate perioperative IV antibiotics and heparin subcu.  SCDs were  placed. General endotracheal anesthesia was induced.she was prepped and draped in normal sterile fashion.  An  appropriate OR time-out was performed.  A nick was made in the  left upper quadrant at Sheffield's point and a Veress needle was inserted.  The abdomen was insufflated with CO2 and an 8 mm Optiview trocar was  inserted under direct visualization just to the left of midline and  above the level of the umbilicus.  The upper abdomen was surveilled and  no scar tissue was found.  We then proceeded to perform a bilateral  transverse abdominis plane block using a mixture of Decadron, Marcaine, and saline.  This mixture was also used to anesthetize the  sites of our other trocar placements.  The following trocars were then  placed by the surgeon and assistant under direct visualization with the laparoscope.  An 8 mm trocar  was placed in the left upper quadrant anterior axillary line.  A 12 mm  trocar was placed in the right upper quadrant anterior axillary line and   An 8 mm trocar was placed in the right upper quadrant  midclavicular line above the level of the umbilicus.  The 8 mm optiview trocar was exchanged for another 12 mm trocar. The patient was then placed in reverse trendelenberg and the Tito liver retractor was inserted through a 5 mm incision at the subxiphoid.  The sleeved stomach was adherent to the edge of the left lobe of the liver and these adhesions were taken down with the jeet so that the left lobe of the liver could be retracted away from the stomach. The assistant then reflected  the omentum and transverse colon cranially and the ligament of Treitz was located.  The small bowel was then  run 50 cm distal to the ligament of Treitz and brought up over the transverse colon. It was sutured to the greater  "curvature of the stomach with a 2-0 Ethibond. The  jose miguel limb was marked with a clip. The robot was then docked.  We began our hiatal hernia repair by opening the pars flacida with the vessel sealer and carefully dissected the stomach and esophagus away from the right joon of the diaphragm. This dissection proceeded circumferentially until we cleared the posterior joon.  We completed our hiatal dissection by clearing the anterior joon as well as the left joon carefully with blunt dissection and sharp dissection using the vessel sealer. We carefully dissected out the intrathoracic attachments of the esophagus until 3 cm length of esophagus rested intra-abdominally without tension.  The hernia recurrence appeared to be anterior and this stomach was not herniated into the chest.  There was very little tension on the esophagus.  The vagus nerves were visualized and preserved. Anesthesia passed a 54 [place] bougie through the esophagus into the stomach to use as a sizer. We closed the hiatal defect posteriorly with a running 2 oh nonabsorbable V-Loc suture in 2 layers.  The bougie was removed.  A [place] bio-a mesh was cut to size and introduced into the abdominal cavity.  It was positioned posterior to the esophagus and a reverse \"C\" position.  It was sutured to the diaphragm with interrupted 2-0 Ethibond sutures.      We then turned our attention to creating our gastric bypass. We   created a defect in the gastrohepatic ligament with the vessel sealer dissecting out this defect until we could visualize the left  gastric pedicle that would supply our gastric pouch.  With a little bit  of blunt dissection, we were able to dissect under the stomach into the  lesser sac.   We also mobilized the omental adhesions along the left lateral edge of the sleeve. We then created  our 15-30 mL gastric pouch with one transverse fire of the green load Endo  stapler with a SeamGuard.  Anesthesia placed at [facility]-Vac tube through the " oropharynx into the stomach pouch to help us  create a gastrostomy on the posterior distal gastric pouch with the hot jeet. We cut our stay suture that held the jejunum up to the stomach and created an enterotomy in the antimesenteric border of the jejunum. A blue load of the endostapler was fired across the stomach and candelario limb to create one wall of our gastrojejunal anastamosis. The common enterotomy was then closed with 2-0 absorbable V-Loc suture. The endoscope was introduced  through the oropharynx into the esophagus and gastric pouch, through our  gastrojejunal anastomosis, and into our proximal Candelario limb. This closure was oversewn with the same 2-0 absorbable V-Loc imbricated sutures, over the endoscope to prevent narrowing of the gastrojejunostomy. We then transected the loop of jejunum to detach the biliopancreatic limb with a white load of the stapler.   We then ran the jejunum another 100 cm distally.  This point was aligned with  the biliopancreatic limb. We then created  enterotomies with the jeet connected to electrocautery in the anti-mesenteric border of  the biliopancreatic limb and distal bowel.  Two white load Endo stapler fires were used to create a [id number] anastomosis here.  The common enterotomy  was closed with another fire of the white load Endo stapler. Finally, the mesenteric defect was  closed with a running 2-0 non absorbable V-Loc suture.  We placed clips along the staple line of the jejunojejunostomy and Candelario limb for hemostasis.  After once more checking for  hemostasis, we proceeded to direct our attention cranially again.  The assistant instilled saline into the epigastrum. Air was  instilled via the endoscope.  There were no bubbles and thus the leak test was negative. This was monitored by the assistant.   We also visualized a wide open gastrojejunal anastomosis with no active  bleeding.  The air was suctioned out of the stomach and the scope was  removed.  The assistant  then removed the bowel clamp and suctioned all the  irrigation from the abdominal cavity.  We removed the liver retractor, examined her abdomen  and satisfied with hemostasis, we closed our 12 mm trocar  sites with several 0 Vicryl sutures using the Jeromy-Kavin device with the help of the assistant.  We then removed the camera, desufflated the abdomen of Co2, and removed our  trocars.  The skin incisions were all closed with 4-0 Monocryl  subcuticular sutures and Dermabond by the assistant. The patient tolerated  the procedure without difficulty, was extubated immediately  postoperatively, was transferred to the PACU in good condition.  All  sponge and instrument counts were correct.     This operation could not have been safely performed (without compromising the technical results or length of the procedure) without the assistance of a skilled surgical assistant. A surgical assistant was medically necessary for positioning, retraction and instrumentation.    Nacho Salmon MD  Bariatric and Minimally Invasive General Surgery

## 2025-05-12 NOTE — CARE PLAN
The patient's goals for the shift include      The clinical goals for the shift include Pain control, ambulation, and tolerate diet      Problem: Pain - Adult  Goal: Verbalizes/displays adequate comfort level or baseline comfort level  Outcome: Progressing     Problem: Safety - Adult  Goal: Free from fall injury  Outcome: Progressing     Problem: Discharge Planning  Goal: Discharge to home or other facility with appropriate resources  Outcome: Progressing

## 2025-05-13 ENCOUNTER — PHARMACY VISIT (OUTPATIENT)
Dept: PHARMACY | Facility: CLINIC | Age: 30
End: 2025-05-13
Payer: MEDICAID

## 2025-05-13 ENCOUNTER — APPOINTMENT (OUTPATIENT)
Dept: RADIOLOGY | Facility: HOSPITAL | Age: 30
End: 2025-05-13
Payer: COMMERCIAL

## 2025-05-13 VITALS
RESPIRATION RATE: 16 BRPM | TEMPERATURE: 98.6 F | SYSTOLIC BLOOD PRESSURE: 113 MMHG | WEIGHT: 154.4 LBS | HEART RATE: 62 BPM | OXYGEN SATURATION: 100 % | BODY MASS INDEX: 28.41 KG/M2 | DIASTOLIC BLOOD PRESSURE: 71 MMHG | HEIGHT: 62 IN

## 2025-05-13 LAB
ACANTHOCYTES BLD QL SMEAR: NORMAL
ALBUMIN SERPL BCP-MCNC: 3.3 G/DL (ref 3.4–5)
ALP SERPL-CCNC: 36 U/L (ref 33–110)
ALT SERPL W P-5'-P-CCNC: 21 U/L (ref 7–45)
ANION GAP SERPL CALC-SCNC: 11 MMOL/L (ref 10–20)
AST SERPL W P-5'-P-CCNC: 25 U/L (ref 9–39)
BASOPHILS # BLD AUTO: 0.01 X10*3/UL (ref 0–0.1)
BASOPHILS NFR BLD AUTO: 0.1 %
BILIRUB SERPL-MCNC: 0.6 MG/DL (ref 0–1.2)
BUN SERPL-MCNC: 9 MG/DL (ref 6–23)
CALCIUM SERPL-MCNC: 8.3 MG/DL (ref 8.6–10.3)
CHLORIDE SERPL-SCNC: 107 MMOL/L (ref 98–107)
CO2 SERPL-SCNC: 23 MMOL/L (ref 21–32)
CREAT SERPL-MCNC: 0.46 MG/DL (ref 0.5–1.05)
EGFRCR SERPLBLD CKD-EPI 2021: >90 ML/MIN/1.73M*2
EOSINOPHIL # BLD AUTO: 0.15 X10*3/UL (ref 0–0.7)
EOSINOPHIL NFR BLD AUTO: 1.3 %
ERYTHROCYTE [DISTWIDTH] IN BLOOD BY AUTOMATED COUNT: 13.4 % (ref 11.5–14.5)
GLUCOSE SERPL-MCNC: 84 MG/DL (ref 74–99)
HCT VFR BLD AUTO: 30.6 % (ref 36–46)
HCT VFR BLD AUTO: 33.5 % (ref 36–46)
HGB BLD-MCNC: 10.5 G/DL (ref 12–16)
HGB BLD-MCNC: 10.9 G/DL (ref 12–16)
IMM GRANULOCYTES # BLD AUTO: 0.06 X10*3/UL (ref 0–0.7)
IMM GRANULOCYTES NFR BLD AUTO: 0.5 % (ref 0–0.9)
INR PPP: 1.3 (ref 0.9–1.1)
LYMPHOCYTES # BLD AUTO: 0.97 X10*3/UL (ref 1.2–4.8)
LYMPHOCYTES NFR BLD AUTO: 8.4 %
MAGNESIUM SERPL-MCNC: 1.84 MG/DL (ref 1.6–2.4)
MCH RBC QN AUTO: 30.3 PG (ref 26–34)
MCHC RBC AUTO-ENTMCNC: 34.3 G/DL (ref 32–36)
MCV RBC AUTO: 88 FL (ref 80–100)
MONOCYTES # BLD AUTO: 0.7 X10*3/UL (ref 0.1–1)
MONOCYTES NFR BLD AUTO: 6.1 %
NEUTROPHILS # BLD AUTO: 9.6 X10*3/UL (ref 1.2–7.7)
NEUTROPHILS NFR BLD AUTO: 83.6 %
NRBC BLD-RTO: 0 /100 WBCS (ref 0–0)
OVALOCYTES BLD QL SMEAR: NORMAL
PLATELET # BLD AUTO: 239 X10*3/UL (ref 150–450)
POTASSIUM SERPL-SCNC: 4.3 MMOL/L (ref 3.5–5.3)
PROT SERPL-MCNC: 5.7 G/DL (ref 6.4–8.2)
PROTHROMBIN TIME: 14.5 SECONDS (ref 9.8–12.4)
RBC # BLD AUTO: 3.47 X10*6/UL (ref 4–5.2)
RBC MORPH BLD: NORMAL
SCHISTOCYTES BLD QL SMEAR: NORMAL
SODIUM SERPL-SCNC: 137 MMOL/L (ref 136–145)
WBC # BLD AUTO: 11.5 X10*3/UL (ref 4.4–11.3)

## 2025-05-13 PROCEDURE — 94760 N-INVAS EAR/PLS OXIMETRY 1: CPT

## 2025-05-13 PROCEDURE — 2500000004 HC RX 250 GENERAL PHARMACY W/ HCPCS (ALT 636 FOR OP/ED): Mod: JZ | Performed by: SURGERY

## 2025-05-13 PROCEDURE — 2500000005 HC RX 250 GENERAL PHARMACY W/O HCPCS: Performed by: SURGERY

## 2025-05-13 PROCEDURE — 74220 X-RAY XM ESOPHAGUS 1CNTRST: CPT

## 2025-05-13 PROCEDURE — 2500000001 HC RX 250 WO HCPCS SELF ADMINISTERED DRUGS (ALT 637 FOR MEDICARE OP): Performed by: SURGERY

## 2025-05-13 PROCEDURE — 85025 COMPLETE CBC W/AUTO DIFF WBC: CPT | Performed by: SURGERY

## 2025-05-13 PROCEDURE — 99024 POSTOP FOLLOW-UP VISIT: CPT | Performed by: SURGERY

## 2025-05-13 PROCEDURE — 74220 X-RAY XM ESOPHAGUS 1CNTRST: CPT | Performed by: RADIOLOGY

## 2025-05-13 PROCEDURE — 80053 COMPREHEN METABOLIC PANEL: CPT | Performed by: SURGERY

## 2025-05-13 PROCEDURE — 83735 ASSAY OF MAGNESIUM: CPT | Performed by: SURGERY

## 2025-05-13 PROCEDURE — 2550000001 HC RX 255 CONTRASTS: Performed by: SURGERY

## 2025-05-13 PROCEDURE — 36415 COLL VENOUS BLD VENIPUNCTURE: CPT | Performed by: SURGERY

## 2025-05-13 PROCEDURE — 85018 HEMOGLOBIN: CPT | Performed by: SURGERY

## 2025-05-13 PROCEDURE — 85610 PROTHROMBIN TIME: CPT | Performed by: SURGERY

## 2025-05-13 RX ORDER — SIMETHICONE 80 MG
80 TABLET,CHEWABLE ORAL EVERY 4 HOURS PRN
Start: 2025-05-13

## 2025-05-13 RX ORDER — DIATRIZOATE MEGLUMINE AND DIATRIZOATE SODIUM 660; 100 MG/ML; MG/ML
30 SOLUTION ORAL; RECTAL ONCE
Status: COMPLETED | OUTPATIENT
Start: 2025-05-13 | End: 2025-05-13

## 2025-05-13 RX ADMIN — ONDANSETRON 4 MG: 2 INJECTION, SOLUTION INTRAMUSCULAR; INTRAVENOUS at 10:29

## 2025-05-13 RX ADMIN — PANTOPRAZOLE SODIUM 40 MG: 40 INJECTION, POWDER, FOR SOLUTION INTRAVENOUS at 06:19

## 2025-05-13 RX ADMIN — KETOROLAC TROMETHAMINE 15 MG: 15 INJECTION, SOLUTION INTRAMUSCULAR; INTRAVENOUS at 00:30

## 2025-05-13 RX ADMIN — POTASSIUM CHLORIDE AND SODIUM CHLORIDE 75 ML/HR: 900; 150 INJECTION, SOLUTION INTRAVENOUS at 03:15

## 2025-05-13 RX ADMIN — FLUOXETINE HYDROCHLORIDE 20 MG: 20 CAPSULE ORAL at 10:29

## 2025-05-13 RX ADMIN — KETOROLAC TROMETHAMINE 15 MG: 15 INJECTION, SOLUTION INTRAMUSCULAR; INTRAVENOUS at 06:19

## 2025-05-13 RX ADMIN — SODIUM CHLORIDE 1000 ML: 0.9 INJECTION, SOLUTION INTRAVENOUS at 14:47

## 2025-05-13 RX ADMIN — Medication 2 L/MIN: at 07:50

## 2025-05-13 RX ADMIN — DIATRIZOATE MEGLUMINE AND DIATRIZOATE SODIUM 20 ML: 660; 100 LIQUID ORAL; RECTAL at 09:10

## 2025-05-13 RX ADMIN — ACETAMINOPHEN 650 MG: 325 TABLET, FILM COATED ORAL at 10:29

## 2025-05-13 ASSESSMENT — COGNITIVE AND FUNCTIONAL STATUS - GENERAL
DAILY ACTIVITIY SCORE: 24
MOBILITY SCORE: 24

## 2025-05-13 ASSESSMENT — PAIN SCALES - GENERAL: PAINLEVEL_OUTOF10: 0 - NO PAIN

## 2025-05-13 NOTE — CARE PLAN
Problem: Pain - Adult  Goal: Verbalizes/displays adequate comfort level or baseline comfort level  Outcome: Progressing     Problem: Safety - Adult  Goal: Free from fall injury  Outcome: Progressing     Problem: Discharge Planning  Goal: Discharge to home or other facility with appropriate resources  Outcome: Progressing     Problem: Chronic Conditions and Co-morbidities  Goal: Patient's chronic conditions and co-morbidity symptoms are monitored and maintained or improved  Outcome: Progressing     Problem: Nutrition  Goal: Nutrient intake appropriate for maintaining nutritional needs  Outcome: Progressing   The patient's goals for the shift include      The clinical goals for the shift include pain control    Recommendations to address these barriers include medications, relaxation, and ambulation.   Patient tolerated ambulation to the bathroom this shift. Patient was able to void.   Patient was able to sleep at least 6 hours this shift, on and off. Patient tolerated diet this shift.

## 2025-05-13 NOTE — CARE PLAN
The patient's goals for the shift include      The clinical goals for the shift include NPO until esophagram, monitor I&Os, encourage mobility      Problem: Pain - Adult  Goal: Verbalizes/displays adequate comfort level or baseline comfort level  Outcome: Progressing     Problem: Safety - Adult  Goal: Free from fall injury  Outcome: Progressing     Problem: Discharge Planning  Goal: Discharge to home or other facility with appropriate resources  Outcome: Progressing     Problem: Chronic Conditions and Co-morbidities  Goal: Patient's chronic conditions and co-morbidity symptoms are monitored and maintained or improved  Outcome: Progressing     Problem: Nutrition  Goal: Nutrient intake appropriate for maintaining nutritional needs  Outcome: Progressing

## 2025-05-13 NOTE — CONSULTS
Nutrition Diet Education:   Nutrition Assessment       Received consult to review post-op Bariatric diet with pt.     Pt has Bariatric Full Liquid diet handout s/p surgery. States has vitamin and protein drinks at home. Denies further questions or concerns at this time, aware RD available if needs arise, pt instructed to follow up with out patient bariatric dietitian.     Vitals:    05/13/25 0757   Weight: 70 kg (154 lb 6.4 oz)      Body mass index is 28.24 kg/m².     Education Documentation  [facility] Bariatric Full Liquid Diet After Weight Loss Surgery, taught by Cristina Paul RDN, LD at 5/13/2025  1:18 PM.  Learner: Patient  Readiness: Acceptance  Method: Explanation, Handout  Response: Verbalizes Understanding      Time Spent (min): 30 minutes

## 2025-05-13 NOTE — PROGRESS NOTES
"BARIATRIC SURGERY PROGRESS NOTE    PATIENT NAME: Erick Becerril  MRN: [id number]  DATE: 5/13/2025    SUMMARY OF CURRENT STATUS  - POD # 1 s/p robotic HHR and revision of sleeeve to RYGB  - tolerating clear liquids  - esophagram shows no leak to my eye, radiology read pending  - hgb dropped 2 grams post op  - denies any major abdominal pain, nausea, SOB, chest pain, leg pain    TODAY'S ASSESSMENT:  - New complications over the past 24 hours: No  - No concerns overnight  - Pain controlled: Yes  - DVT prophylaxis:  Yes - Lovenox and SCDs  - Diet is currently: Phase 1  - Nausea:  No  - Emesis:  No  - Pt has sat in the chair / ambulated    ON EXAMINATION:  /76   Pulse 75   Temp 37 °C (98.6 °F)   Resp 16   Ht 1.575 m (5' 2\")   Wt 70 kg (154 lb 6.4 oz)   LMP 07/29/2022   SpO2 98%   BMI 28.24 kg/m²   APPEARANCE: NAD, looks well.  ABDOMEN:  Soft, nadira-incisional tenderness, nondistended  WOUND(S):  Incisions Clean / Dry / Intact    INS/OUTS:    Intake/Output Summary (Last 24 hours) at 5/13/2025 1016  Last data filed at 5/13/2025 0628  Gross per 24 hour   Intake 1263.75 ml   Output 310 ml   Net 953.75 ml        BLOOD WORK:  Results for orders placed or performed during the hospital encounter of 05/12/25 (from the past 24 hours)   Protime-INR   Result Value Ref Range    Protime 14.5 (H) 9.8 - 12.4 seconds    INR 1.3 (H) 0.9 - 1.1   Comprehensive metabolic panel   Result Value Ref Range    Glucose 84 74 - 99 mg/dL    Sodium 137 136 - 145 mmol/L    Potassium 4.3 3.5 - 5.3 mmol/L    Chloride 107 98 - 107 mmol/L    Bicarbonate 23 21 - 32 mmol/L    Anion Gap 11 10 - 20 mmol/L    Urea Nitrogen 9 6 - 23 mg/dL    Creatinine 0.46 (L) 0.50 - 1.05 mg/dL    eGFR >90 >60 mL/min/1.73m*2    Calcium 8.3 (L) 8.6 - 10.3 mg/dL    Albumin 3.3 (L) 3.4 - 5.0 g/dL    Alkaline Phosphatase 36 33 - 110 U/L    Total Protein 5.7 (L) 6.4 - 8.2 g/dL    AST 25 9 - 39 U/L    Bilirubin, Total 0.6 0.0 - 1.2 mg/dL    ALT 21 7 - 45 U/L   CBC and Auto " Differential   Result Value Ref Range    WBC 11.5 (H) 4.4 - 11.3 x10*3/uL    nRBC 0.0 0.0 - 0.0 /100 WBCs    RBC 3.47 (L) 4.00 - 5.20 x10*6/uL    Hemoglobin 10.5 (L) 12.0 - 16.0 g/dL    Hematocrit 30.6 (L) 36.0 - 46.0 %    MCV 88 80 - 100 fL    MCH 30.3 26.0 - 34.0 pg    MCHC 34.3 32.0 - 36.0 g/dL    RDW 13.4 11.5 - 14.5 %    Platelets 239 150 - 450 x10*3/uL    Neutrophils % 83.6 40.0 - 80.0 %    Immature Granulocytes %, Automated 0.5 0.0 - 0.9 %    Lymphocytes % 8.4 13.0 - 44.0 %    Monocytes % 6.1 2.0 - 10.0 %    Eosinophils % 1.3 0.0 - 6.0 %    Basophils % 0.1 0.0 - 2.0 %    Neutrophils Absolute 9.60 (H) 1.20 - 7.70 x10*3/uL    Immature Granulocytes Absolute, Automated 0.06 0.00 - 0.70 x10*3/uL    Lymphocytes Absolute 0.97 (L) 1.20 - 4.80 x10*3/uL    Monocytes Absolute 0.70 0.10 - 1.00 x10*3/uL    Eosinophils Absolute 0.15 0.00 - 0.70 x10*3/uL    Basophils Absolute 0.01 0.00 - 0.10 x10*3/uL   Magnesium   Result Value Ref Range    Magnesium 1.84 1.60 - 2.40 mg/dL   Morphology   Result Value Ref Range    RBC Morphology See Below     RBC Fragments Few     Ovalocytes Few     Acanthocytes Few        IMPRESSION:  - Pt is doing well / as expected s/p Bariatric Surgery  - No new issues/concerns    PLAN POD1:  - Esophagram today  - Advance to Bariatric Phase 2 diet following esophagram.  Goal 40 oz prior to discharge home, 64 oz daily of fluids.  - recheck hgb. Hold lovenox and toradol until repeat lab  - Multimodal pain regimen: scheduled tylenol, scheduled toradol, PRN oxycodone  - Encourage Ambulation / use of incentive spirometry  - VTE prophylaxis - Continue with SCDs and Lovenox  - Disposition - Likely home later today versus tomorrow    Nacho Salmon MD  Bariatric and Minimally Invasive General Surgery

## 2025-05-13 NOTE — PROGRESS NOTES
"Follow Up Bariatric Nutrition Assessment    Name: Erick Becerril  MRN: [id number]  Date: 05/20/25    Surgery Date: 5/12/25  Surgeon: Viky  Procedure: gastric bypass/HHR     ASSESSMENT:  Current weight in pounds:    Vitals:    05/20/25 1333   Weight: 66.7 kg (147 lb)    Ht:   1.575 m (5' 2\")     BMI: Body mass index is 26.89 kg/m².  Previous weight in pounds: 154.3  5/13/25  Initial start weight in pounds: 261.0  10/11/23  EBW in pounds: 125.0  Total weight change in pounds: 114.0  %EBW Lost: 91.2    PROGRESS:  Nutrition Interventions for last encounter:  Drink 64 oz of fluid daily  Drink enough protein shakes to meet your goal of 60-70 g of protein per day  Take 2 chewable multivitamins, 7461-3011 mg of calcium citrate, and 500 mcg of B12 daily  Get up and walk frequently throughout the day.     CHANGES IN TREATMENT:   Patient met goals: partially    24 hour food recall: 45 oz. fluid and 45 grams protein  Beverages: , water, 1.5 Premier protein shakes, sf applesauce, broth  Alcohol: no    Vitamins:  will start back on: Bariatric Fusion with iron MVI (4x/day),  1200 mg calcium (in MVI), and  B12(in MVI)   Medications: Current Medications[1]    Physical Activity: walking at least 30 min. daily    READINESS TO LEARN:  Motivation to learn:         Interested     Understanding of instruction: Good  Anticipated Compliance:  Good  Family Support: Unable to assess-family not present     The pt is 1 week post op gastric bypass /HHR.    Patient has lost 114.0 pounds since initial assessment accounting for 91.2% loss excess body weight.  Patient is tolerating full liquid diet.  Protein intake is not quite adequate for post-op individual. Fluid consumption is not quite adequate. Patient will start supplementing recommended vitamin/minerals this week. Pt states she is feeling well and is not having any problems with constipation of nausea.  Hoping to meet protein and fluid goals within the next week.  Energy is good.  Reviewed the " puree and soft foods.   Reminded pt. to eat slowly, chew thoroughly and to try one new food at a time.    Malnutrition Screening  Significant unintentional weight loss? n/a  Eating less than 75% of usual intake for more than 2 weeks? n/a      Nutrition Diagnosis:   Increased protein and nutrition needs related to altered GI function as evidenced by pt. s/p gastric bypass/HHR.  Food- and nutrition-related knowledge deficit related to lack of prior exposure to surgical weight loss information as evidenced by diet recall.     Nutrition Interventions:   Modify type and amount of food and nutrients within meals and snacks.  Comprehensive Nutrition Education  -Nutrition education materials: none     Recommendations:    Continue to drink your protein shakes to meet your goal of 60-70 g of protein per day.  Continue to drink 64 oz. of zero calorie beverages per day  Continue no drinking 30 min before, during the meal and for 30 minutes after the meal  Continue to exercise   Advance to the puree diet on 5/26/25 for 2 weeks, then soft food for 2 weeks  Remember to eat slowly and chew thoroughly  Try one new food at a time to test for any intolerances.   Continue to take all of your vitamins and minerals.   Attend support groups as needed.    Nutrition Monitoring and Evaluation:   1-2 pounds weight loss per week  Criteria: weight check, food recall  Need for Follow-up: 6 weeks post op, sooner as needed.         [1]   Current Outpatient Medications:     acetaminophen (Tylenol) 325 mg tablet, Take 2 tablets (650 mg) by mouth every 6 hours. 10/16/24  Pt verbalizes taking PRN [facility] LPN, Disp: , Rfl:     ALPRAZolam (Xanax) 0.5 mg tablet, take 1 to 2 tablets by mouth once daily if needed for anxiety, Disp: , Rfl:     cholecalciferol (Vitamin D3) 50 MCG (2000 UT) tablet, Take 1 tablet (2,000 Units) by mouth once daily., Disp: , Rfl:     FLUoxetine (PROzac) 20 mg capsule, Take 1 capsule (20 mg) by mouth once daily., Disp: , Rfl:      multivitamin-min-iron-FA-vit K 45 mg iron- 800 mcg-120 mcg capsule, Take 1 tablet by mouth 2 times a day., Disp: , Rfl:     omeprazole (PriLOSEC) 40 mg DR capsule, Take 1 capsule (40 mg) by mouth 2 times a day. Do not crush or chew., Disp: 30 capsule, Rfl: 1    ondansetron ODT (Zofran-ODT) 4 mg disintegrating tablet, Dissolve 1 tablet (4 mg) in the mouth every 6 hours if needed for nausea or vomiting., Disp: 15 tablet, Rfl: 1    promethazine (Phenergan) 12.5 mg tablet, Take 1 tablet (12.5 mg) by mouth every 6 hours if needed for nausea or vomiting., Disp: 30 tablet, Rfl: 1    simethicone (Mylicon) 80 mg chewable tablet, Chew 1 tablet (80 mg) every 4 hours if needed for flatulence (gas pain)., Disp: , Rfl:     UNABLE TO FIND, Take 1 tablet by mouth once daily. Med Name: Baritastic Calcium Supplement, Disp: , Rfl:

## 2025-05-13 NOTE — DISCHARGE INSTRUCTIONS
Erick Becerril  1995    Date of admission: 5/12/25    Date of discharge: 05/13/25    DISPOSITION: home    PRINCIPAL DIAGNOSIS:  Morbid Obesity    OTHER DIAGNOSES:  This SmartSection is not supported in the current .    Primary Care:     Dale Pro DO  Other Providers:         Procedures while hospitalized:  Robotic revision of sleeve to RYGB and hiatal hernia repair with mesh    PENDING RESULTS:  none    Wound care: You may shower and wash your abdomen with mild soap and water. Pat incisions to dry.  Do not soak in a bath or pool for at least 2 weeks. Your incisions are closed with dissolvable stitches and skin glue. The glue will peel off on its own after about 1 week.    Activity: You may walk and climb stairs as tolerated. You should not lie or sit down for more than 1 hour at a time except for when sleeping at night. Activity is important to prevent blood clots. No heavy lifting of objects greater than 10 lbs (or a gallon of milk).    No driving or returning to work until you no longer require narcotic pain medications (Roxicodone).    Diet: You will be on a Phase 2 diet for the next 10-14 days. This includes sugar-free, non-carbonated clear liquids, thick liquids (like strained cream soups, sugar-free pudding, and protein shakes). After about 2 weeks or when cleared by your surgeon/dietician, you may begin the Phase 3 pureed diet (as per your Bariatric Guidebook).    Remember to drink at least 64 oz of liquids per day and try to take in 40-60 grams of protein.     Avoid straws and carbonated beverages. Take small sips every couple of minutes. Your water bottle is your best friend and you shouldn't go anywhere without it!     Warning: If you experience severe pain, fever over 101 degrees F, redness or drainage from incisions, nausea/vomiting that lasts more than 12 hours, rapid heart rate or shortness of breath, call your surgeon immediately.       Your medication list        START taking  these medications        Instructions Last Dose Given Next Dose Due   ondansetron ODT 4 mg disintegrating tablet  Commonly known as: Zofran-ODT      Dissolve 1 tablet (4 mg) in the mouth every 6 hours if needed for nausea or vomiting.       oxyCODONE 5 mg immediate release tablet  Commonly known as: Roxicodone      Take 1 tablet (5 mg) by mouth every 6 hours if needed for severe pain (7 - 10) or moderate pain (4 - 6) for up to 6 days.       simethicone 80 mg chewable tablet  Commonly known as: Mylicon      Chew 1 tablet (80 mg) every 4 hours if needed for flatulence (gas pain).              CONTINUE taking these medications        Instructions Last Dose Given Next Dose Due   acetaminophen 325 mg tablet  Commonly known as: Tylenol      Take 2 tablets (650 mg) by mouth every 6 hours. 10/16/24  Pt verbalizes taking PRN [facility] LPN       ALPRAZolam 0.5 mg tablet  Commonly known as: Xanax           FLUoxetine 20 mg capsule  Commonly known as: PROzac           multivitamin-min-iron-FA-vit K 45 mg iron- 800 mcg-120 mcg capsule           omeprazole 40 mg DR capsule  Commonly known as: PriLOSEC      Take 1 capsule (40 mg) by mouth 2 times a day. Do not crush or chew.       UNABLE TO FIND           Vitamin D3 50 mcg (2,000 units) tablet  Generic drug: cholecalciferol                     Where to Get Your Medications        Information about where to get these medications is not yet available    Ask your nurse or doctor about these medications  simethicone 80 mg chewable tablet         You do not need to begin taking your vitamins until instructed by your surgeon at your first follow-up visit.    Please consult your medical doctor regarding adjusting your medications after surgery (particularly diabetic and blood pressure medications).    Follow up with Dr Salmon in her [facility] office on 5/30/25.

## 2025-05-14 NOTE — DISCHARGE SUMMARY
Discharge Diagnosis  Bile reflux esophagitis    Issues Requiring Follow-Up  None    Test Results Pending At Discharge  Pending Labs       No current pending labs.            Hospital Course   Erick Becerril underwent an uncomplicated robot-assisted laparoscopy revision of sleeve gastrectomy to Candelario en Y gastric bypass and hiatal hernia repair with mesh by Dr Salmon. she recovered on the regular nursing floor. she was started on a clear liquid diet. On POD#1 she had an esophagram which showed no leak or obstruction. she was then advanced to a full liquid diet. On discharge, her pain was controlled on oral pain medications, ambulating and voiding without difficulty, and she was tolerating an adequate amount of liquids by mouth. she was discharged in good condition on POD#1.        Home Medications     Medication List      START taking these medications     ondansetron ODT 4 mg disintegrating tablet; Commonly known as:   Zofran-ODT; Dissolve 1 tablet (4 mg) in the mouth every 6 hours if needed   for nausea or vomiting.   oxyCODONE 5 mg immediate release tablet; Commonly known as: Roxicodone;   Take 1 tablet (5 mg) by mouth every 6 hours if needed for severe pain (7 -   10) or moderate pain (4 - 6) for up to 6 days.   simethicone 80 mg chewable tablet; Commonly known as: Mylicon; Chew 1   tablet (80 mg) every 4 hours if needed for flatulence (gas pain).     CONTINUE taking these medications     acetaminophen 325 mg tablet; Commonly known as: Tylenol; Take 2 tablets   (650 mg) by mouth every 6 hours. 10/16/24  Pt verbalizes taking PRN [facility]   LPN   ALPRAZolam 0.5 mg tablet; Commonly known as: Xanax   FLUoxetine 20 mg capsule; Commonly known as: PROzac   multivitamin-min-iron-FA-vit K 45 mg iron- 800 mcg-120 mcg capsule   omeprazole 40 mg DR capsule; Commonly known as: PriLOSEC; Take 1 capsule   (40 mg) by mouth 2 times a day. Do not crush or chew.   UNABLE TO FIND   Vitamin D3 50 mcg (2,000 units) tablet; Generic drug:  cholecalciferol       Outpatient Follow-Up  Future Appointments   Date Time Provider Department Center   5/20/2025  1:30 PM Amanda Woodson RD, VERO [id number] [facility]   5/30/2025  8:30 AM MD Martir [email or web address] [facility]   6/24/2025 10:30 AM Amanda Woodson RD, VERO [id number] [facility]   6/27/2025  9:30 AM MD Lisa [email or web address] [facility]   7/29/2025 10:00 AM JEANINE Bernabe-CNP [id number] [facility]   8/5/2025 10:30 AM Amanda Woodson RD, VEOR [id number] [facility]   8/8/2025 10:00 AM MD Lisa [email or web address] [facility]       Nacho Salmon MD

## 2025-05-19 ENCOUNTER — TELEPHONE (OUTPATIENT)
Dept: SURGERY | Facility: CLINIC | Age: 30
End: 2025-05-19
Payer: COMMERCIAL

## 2025-05-19 DIAGNOSIS — K21.9 HIATAL HERNIA WITH GASTROESOPHAGEAL REFLUX DISEASE WITHOUT ESOPHAGITIS: Primary | ICD-10-CM

## 2025-05-19 DIAGNOSIS — K44.9 HIATAL HERNIA WITH GASTROESOPHAGEAL REFLUX DISEASE WITHOUT ESOPHAGITIS: Primary | ICD-10-CM

## 2025-05-19 RX ORDER — PROMETHAZINE HYDROCHLORIDE 12.5 MG/1
12.5 TABLET ORAL EVERY 6 HOURS PRN
Qty: 30 TABLET | Refills: 1 | Status: SHIPPED | OUTPATIENT
Start: 2025-05-19

## 2025-05-19 NOTE — TELEPHONE ENCOUNTER
This patient just called RN stating that she has had nausea since yesterday afternoon. Patient stated that she has not vomited, but has had several dry heaving spells. Patient requested a different nausea medication be sent in for her to the [facility]. RN stated that she would make Dr Salmon aware. RN educated patient to continue to try to take small frequent sips of water to not get behind on hydration. Rn also stated that once she knows what medication Dr Salmon is going to give the patient she will make patient aware. Patient verbalized understanding.

## 2025-05-20 ENCOUNTER — APPOINTMENT (OUTPATIENT)
Dept: SURGERY | Facility: CLINIC | Age: 30
End: 2025-05-20
Payer: COMMERCIAL

## 2025-05-20 VITALS — HEIGHT: 62 IN | BODY MASS INDEX: 27.05 KG/M2 | WEIGHT: 147 LBS

## 2025-05-20 DIAGNOSIS — Z98.84 S/P GASTRIC BYPASS: Primary | ICD-10-CM

## 2025-05-28 ENCOUNTER — TELEPHONE (OUTPATIENT)
Dept: SURGERY | Facility: CLINIC | Age: 30
End: 2025-05-28
Payer: COMMERCIAL

## 2025-05-28 NOTE — TELEPHONE ENCOUNTER
This RN attempted to call patient to go over general information prior to your scheduled visit with Dr. Salmon on 5/30/25 at 8:30 am at [facility] (inside [facility], main floor in the Specialty Clinic).   Parking is available at a cost of $5.00 at the Main Entrance.  We look forward to seeing you!  Maria Elena Bates RN, Clinic Nurse

## 2025-05-30 ENCOUNTER — APPOINTMENT (OUTPATIENT)
Dept: SURGERY | Facility: CLINIC | Age: 30
End: 2025-05-30
Payer: COMMERCIAL

## 2025-05-30 VITALS
HEIGHT: 62 IN | BODY MASS INDEX: 26.74 KG/M2 | DIASTOLIC BLOOD PRESSURE: 71 MMHG | SYSTOLIC BLOOD PRESSURE: 105 MMHG | OXYGEN SATURATION: 98 % | WEIGHT: 145.3 LBS | HEART RATE: 113 BPM

## 2025-05-30 DIAGNOSIS — Z98.84 S/P GASTRIC BYPASS: Primary | ICD-10-CM

## 2025-05-30 DIAGNOSIS — R13.19 ESOPHAGEAL DYSPHAGIA: ICD-10-CM

## 2025-05-30 DIAGNOSIS — K21.00 BILE REFLUX ESOPHAGITIS: ICD-10-CM

## 2025-05-30 DIAGNOSIS — K91.2 INTESTINAL MALABSORPTION FOLLOWING GASTRECTOMY (HHS-HCC): ICD-10-CM

## 2025-05-30 DIAGNOSIS — Z90.3 INTESTINAL MALABSORPTION FOLLOWING GASTRECTOMY (HHS-HCC): ICD-10-CM

## 2025-05-30 DIAGNOSIS — K44.9 HIATAL HERNIA: ICD-10-CM

## 2025-05-30 PROCEDURE — 99024 POSTOP FOLLOW-UP VISIT: CPT | Performed by: SURGERY

## 2025-05-30 PROCEDURE — 3008F BODY MASS INDEX DOCD: CPT | Performed by: SURGERY

## 2025-05-30 PROCEDURE — 1036F TOBACCO NON-USER: CPT | Performed by: SURGERY

## 2025-05-30 NOTE — PROGRESS NOTES
BARIATRIC SURGERY CLINIC  FOLLOW UP NOTE      Name: Erick Becerril  MRN: [id number]      Index Surgery  Date of Surgery: 5/12/25   Surgeon: Nacho Salmon MD  Surgical Procedure: Revision: Lap revision of sleeve to RYGB 82772 and Hiatal hernia repair 41932 - recurrent  Initial weight: 261 lbs  Pre-surgical weight: 152 lbs      Other Bariatric Surgeries  Lap sleeve and HHR 6/4/24    Visit: 3   weeks  Today's Visit:   Wt Readings from Last 1 Encounters:   05/30/25 65.9 kg (145 lb 4.8 oz)    Body mass index is 26.58 kg/m².   Last Visit:    Wt Readings from Last 3 Encounters:   05/30/25 65.9 kg (145 lb 4.8 oz)   05/20/25 66.7 kg (147 lb)   05/13/25 70 kg (154 lb 6.4 oz)       Total weight loss: 7 lbs since surgery - 116 lbs since starting program    HPI: doing well. Denies dysphagia and reflux. On bid omeprazole.    DIET INTAKE: Pureed    Diet History:     Fluid intake: 64 oz/day      DAILY SUPPLEMENTS:  Calcium: Calcium Citrate w/ vitamin D (1200 - 1500mg)  Multivitamin & Minerals: 1 per day  Iron Supplement: included in multi-vitamin  Vitamin B12: included in multi-vitamin  Vitamin D3: included in multi-vitamin  Other: none  PPI:bid omeprazole 40 mg    EXERCISE: walking    Symptoms:      Nausea/vomiting: denies   Food intolerance: denies   Constipation: denies   Diarrhea: denies   Experiencing dumping: denies   Abdominal pain: denies   Reflux: denies Are you on medications: oemprazole bid      Current Medications[1]    Comorbidities:  No problem-specific Assessment & Plan notes found for this encounter.        REVIEW OF SYSTEMS:  CONSTITUTIONAL: Patient denies fevers, chills, sweats and weight changes.  EYES: Patient denies any visual symptoms.  EARS, NOSE, AND THROAT: No difficulties with hearing. No symptoms of rhinitis or sore throat.  CARDIOVASCULAR: Patient denies chest pains, palpitations, orthopnea and paroxysmal nocturnal dyspnea.  RESPIRATORY: No dyspnea on exertion, no wheezing or cough.  GI: No nausea,  "vomiting, diarrhea, constipation, abdominal pain, hematochezia or melena.  : No urinary hesitancy or dribbling. No nocturia or urinary frequency. No abnormal urethral discharge.  MUSCULOSKELETAL: No myalgias or arthralgias.  NEUROLOGIC: No chronic headaches, no seizures. Patient denies numbness, tingling or weakness.  PSYCHIATRIC: Patient denies problems with mood disturbance. No problems with anxiety.  ENDOCRINE: No excessive urination or excessive thirst.  DERMATOLOGIC: Patient denies any rashes or skin changes.    PHYSICAL EXAM:  /71 (BP Location: Right arm, Patient Position: Sitting, BP Cuff Size: Adult)   Pulse (!) 113   Ht 1.575 m (5' 2\")   Wt 65.9 kg (145 lb 4.8 oz)   LMP 07/29/2022   SpO2 98%   BMI 26.58 kg/m²   GENERAL: Obese. No apparent distress. Pt is alert and oriented x3.  HEENT: Head is normocephalic and atraumatic. Extraocular muscles are intact. Pupils are equal, round, and reactive to light and accommodation. Nares appeared normal. Mouth is well hydrated and without lesions. Mucous membranes are moist. Posterior pharynx clear of any exudate or lesions.  NECK: Supple. No carotid bruits. No lymphadenopathy or thyromegaly.  LUNGS: Clear to auscultation.  HEART: Regular rate and rhythm without murmur.  ABDOMEN: Soft, nontender, and nondistended. Positive bowel sounds. No hepatosplenomegaly was noted.  EXTREMITIES: Without any cyanosis, clubbing, rash, lesions or edema.  NEUROLOGIC: Cranial nerves II through XII are grossly intact.  PSYCHIATRIC: Flat affect, but denies suicidal or homicidal ideations.  SKIN: No ulceration or induration present.      A/P: Normal post-OP course    No sign of infection, Doing well, Excellent Pain control, and tolerating diet    Energy: Energy good    Weight loss: Steady    Recommendations: counseled on exercise, No heavy lifting > 10 lbs for: 2 more weeks, Fluid intake 60 oz/day, Protein 60 g/day, counseled on diet: advance to soft food diet, and Continue " taking vitamins as directed.    Follow up: 4 weeks    Decrease omeprazole to once a day.     Nacho Salmon MD  Bariatric and Minimally Invasive General Surgery                     [1]   Current Outpatient Medications   Medication Sig Dispense Refill    acetaminophen (Tylenol) 325 mg tablet Take 2 tablets (650 mg) by mouth every 6 hours. 10/16/24  Pt verbalizes taking PRN [facility] LPN      ALPRAZolam (Xanax) 0.5 mg tablet take 1 to 2 tablets by mouth once daily if needed for anxiety      cholecalciferol (Vitamin D3) 50 MCG (2000 UT) tablet Take 1 tablet (2,000 Units) by mouth once daily.      FLUoxetine (PROzac) 20 mg capsule Take 1 capsule (20 mg) by mouth once daily.      multivitamin-min-iron-FA-vit K 45 mg iron- 800 mcg-120 mcg capsule Take 1 tablet by mouth 2 times a day.      omeprazole (PriLOSEC) 40 mg DR capsule Take 1 capsule (40 mg) by mouth 2 times a day. Do not crush or chew. 30 capsule 1    ondansetron ODT (Zofran-ODT) 4 mg disintegrating tablet Dissolve 1 tablet (4 mg) in the mouth every 6 hours if needed for nausea or vomiting. 15 tablet 1    promethazine (Phenergan) 12.5 mg tablet Take 1 tablet (12.5 mg) by mouth every 6 hours if needed for nausea or vomiting. 30 tablet 1    UNABLE TO FIND Take 1 tablet by mouth once daily. Med Name: Baritastic Calcium Supplement      simethicone (Mylicon) 80 mg chewable tablet Chew 1 tablet (80 mg) every 4 hours if needed for flatulence (gas pain). (Patient not taking: Reported on 5/28/2025)       No current facility-administered medications for this visit.

## 2025-05-30 NOTE — PATIENT INSTRUCTIONS
Decrease omeprazole to once a day.     Advance diet as tolerated. OK to increase cardio exercise, but no heavy lifting > 10 lbs for 2 more weeks.    Join the [employer] walking challenge. It starts 6/9. Game code is: [id number].

## 2025-06-17 NOTE — PROGRESS NOTES
"Follow Up Bariatric Nutrition Assessment    Name: Erick Becerril  MRN: [id number]  Date: 06/24/25     Surgery Date: 5/12/25  Surgeon: Viky  Procedure: gastric bypass/HHR    ASSESSMENT:  Current weight in pounds:     Vitals:    06/24/25 1037   Weight: 65.8 kg (145 lb)      Ht:  1.575 m (5' 2\")   BMI:  Body mass index is 26.52 kg/m².  Previous weight in pounds: 154.3  5/13/25    Initial start weight in pounds: 261.0  10/11/23 prior to sleeve  EBW in pounds: 125.0  Total weight change in pounds: 116.0  %EBW Lost: 93.0    PROGRESS:  Nutrition Interventions for last encounter   Continue to drink your protein shakes to meet your goal of 60-70 g of protein per day. Begin measuring how much protein you can eat on the soft diet so that you know when to start weaning off of your protein shakes.   Continue to drink 64 oz. of zero calorie beverages per day  Continue no drinking 30 min before, during the meal and for 30 minutes after the meal  Continue to exercise  Advance to the puree diet for 1 week, then soft food for 3 weeks  Remember to eat slowly and chew thoroughly  Try one new food at a time to test for any intolerances.   Continue to take all of your vitamins and minerals.     CHANGES IN TREATMENT:   Patient met goals: yes   24 hour food recall: 58 oz. fluid and 62 grams protein  Breakfast:  boiled egg and 2 oz. cottage cheese  Snack: no  Lunch: 2 oz. chicken over greens with sf dressing  Snack: Premier protein shake  Dinner: 2 oz. steak and  1/2 c. cooked broccoli  Snack: no  Beverages:  water, Premier protein shake  Alcohol: no    Vitamins: will start back on: Bariatric Fusion with iron MVI (4x/day),  1200 mg calcium (in MVI), and  B12(in MVI)   Medications: Current Medications[1]    Physical Activity: walking 4x/wk for 60 min.    READINESS TO LEARN:  Motivation to learn:          Interested      Understanding of instruction:  Good      Family Support: Unable to assess-family not present      The pt is 6 weeks postop " gastric bypass/HHR  Patient has lost 116.0 pounds since initial assessment accounting for 93.0% loss excess body weight.  Tolerating soft diet without difficulty.  Protein intake is adequate for post-op individual. Fluid consumption is almost adequate.  Patient is supplementing recommended vitamin/minerals.   States she is feeling well and is not having any difficulties.  Discussed the transition diet.    Reminded pt to eat slowly, chew thoroughly and to try on new food at a time.     Malnutrition Screening  Significant unintentional weight loss? n/a  Eating less than 75% of usual intake for more than 2 weeks? n/a    Nutrition Diagnosis:   Increased protein and nutrition needs related to altered GI function as evidenced by pt. s/p gastric bypass/HHR.  Food- and nutrition-related knowledge deficit related to lack of prior exposure to surgical weight loss information as evidenced by diet recall.     Nutrition Interventions:   Modify type and amount of food and nutrients within meals and snacks.  Comprehensive Nutrition Education  -Nutrition education materials: none   Recommendations:    1.   Continue to eat 60-70 g of protein per day.  2.   Continue to drink 64 oz. of zero calorie beverages per day.  3.   Continue no drinking 30 min before, during the meal and for 30 minutes after the meal        4.   Increase intensity and duration of exercise.  5.   Advance to transition diet. Try raw veggies, fresh fruit and lean ground beef.   6.   Eat slowly, chew thoroughly.  7.   Try one new food at a time.         8.   Continue current vit/min regimen.  9.   Attend support groups as needed.    Nutrition Monitoring and Evaluation:   1-2 pounds weight loss per week  Criteria: weight check, food recall  Need for Follow-up: 3 months post-op, sooner as needed.         [1]   Current Outpatient Medications:     acetaminophen (Tylenol) 325 mg tablet, Take 2 tablets (650 mg) by mouth every 6 hours. 10/16/24  Pt verbalizes taking PRN  [facility] LPN, Disp: , Rfl:     ALPRAZolam (Xanax) 0.5 mg tablet, take 1 to 2 tablets by mouth once daily if needed for anxiety, Disp: , Rfl:     cholecalciferol (Vitamin D3) 50 MCG (2000 UT) tablet, Take 1 tablet (2,000 Units) by mouth once daily., Disp: , Rfl:     FLUoxetine (PROzac) 20 mg capsule, Take 1 capsule (20 mg) by mouth once daily., Disp: , Rfl:     multivitamin-min-iron-FA-vit K 45 mg iron- 800 mcg-120 mcg capsule, Take 1 tablet by mouth 2 times a day., Disp: , Rfl:     omeprazole (PriLOSEC) 40 mg DR capsule, Take 1 capsule (40 mg) by mouth 2 times a day. Do not crush or chew., Disp: 30 capsule, Rfl: 1    ondansetron ODT (Zofran-ODT) 4 mg disintegrating tablet, Dissolve 1 tablet (4 mg) in the mouth every 6 hours if needed for nausea or vomiting., Disp: 15 tablet, Rfl: 1    promethazine (Phenergan) 12.5 mg tablet, Take 1 tablet (12.5 mg) by mouth every 6 hours if needed for nausea or vomiting., Disp: 30 tablet, Rfl: 1    UNABLE TO FIND, Take 1 tablet by mouth once daily. Med Name: Baritastic Calcium Supplement, Disp: , Rfl:

## 2025-06-20 ENCOUNTER — TELEPHONE (OUTPATIENT)
Dept: SURGERY | Facility: CLINIC | Age: 30
End: 2025-06-20
Payer: COMMERCIAL

## 2025-06-20 NOTE — TELEPHONE ENCOUNTER
This RN attempted to call patient to go over general information prior to  your scheduled visit with Dr. Salmon on 6/27/25 at 9:30 am at [facility] (inside [facility], main floor in the Specialty Clinic).   Parking is available at a cost of $5.00 at the Main Entrance.  We look forward to seeing you!  Maria Elena Bates RN, Clinic Nurse

## 2025-06-24 ENCOUNTER — APPOINTMENT (OUTPATIENT)
Dept: SURGERY | Facility: CLINIC | Age: 30
End: 2025-06-24
Payer: COMMERCIAL

## 2025-06-24 VITALS — HEIGHT: 62 IN | WEIGHT: 145 LBS | BODY MASS INDEX: 26.68 KG/M2

## 2025-06-24 DIAGNOSIS — Z98.84 S/P GASTRIC BYPASS: Primary | ICD-10-CM

## 2025-06-27 ENCOUNTER — APPOINTMENT (OUTPATIENT)
Dept: SURGERY | Facility: CLINIC | Age: 30
End: 2025-06-27
Payer: COMMERCIAL

## 2025-06-27 VITALS
HEART RATE: 66 BPM | SYSTOLIC BLOOD PRESSURE: 100 MMHG | BODY MASS INDEX: 27.71 KG/M2 | HEIGHT: 62 IN | OXYGEN SATURATION: 99 % | WEIGHT: 150.6 LBS | DIASTOLIC BLOOD PRESSURE: 64 MMHG

## 2025-06-27 DIAGNOSIS — Z98.84 S/P GASTRIC BYPASS: ICD-10-CM

## 2025-06-27 DIAGNOSIS — K44.9 HIATAL HERNIA WITH GASTROESOPHAGEAL REFLUX DISEASE WITHOUT ESOPHAGITIS: ICD-10-CM

## 2025-06-27 DIAGNOSIS — Z90.3 INTESTINAL MALABSORPTION FOLLOWING GASTRECTOMY (HHS-HCC): ICD-10-CM

## 2025-06-27 DIAGNOSIS — Z98.84 BARIATRIC SURGERY STATUS: ICD-10-CM

## 2025-06-27 DIAGNOSIS — K91.2 INTESTINAL MALABSORPTION FOLLOWING GASTRECTOMY (HHS-HCC): ICD-10-CM

## 2025-06-27 DIAGNOSIS — Z13.21 ENCOUNTER FOR VITAMIN DEFICIENCY SCREENING: ICD-10-CM

## 2025-06-27 DIAGNOSIS — Z98.84 STATUS POST BARIATRIC SURGERY: ICD-10-CM

## 2025-06-27 DIAGNOSIS — Z98.890 S/P REPAIR OF PARAESOPHAGEAL HERNIA: Primary | ICD-10-CM

## 2025-06-27 DIAGNOSIS — K21.9 HIATAL HERNIA WITH GASTROESOPHAGEAL REFLUX DISEASE WITHOUT ESOPHAGITIS: ICD-10-CM

## 2025-06-27 DIAGNOSIS — Z87.19 S/P REPAIR OF PARAESOPHAGEAL HERNIA: Primary | ICD-10-CM

## 2025-06-27 PROCEDURE — 99024 POSTOP FOLLOW-UP VISIT: CPT | Performed by: SURGERY

## 2025-06-27 PROCEDURE — 3008F BODY MASS INDEX DOCD: CPT | Performed by: SURGERY

## 2025-06-27 PROCEDURE — 1036F TOBACCO NON-USER: CPT | Performed by: SURGERY

## 2025-06-27 NOTE — PROGRESS NOTES
BARIATRIC SURGERY CLINIC  FOLLOW UP NOTE      Name: Erick Becerril  MRN: [id number]      Index Surgery  Date of Surgery: 5/12/25   Surgeon: Nacho Salmon MD  Surgical Procedure: Revision: Lap revision of sleeve to RYGB 13362 and Hiatal hernia repair 92297 - recurrent  Initial weight: 261 lbs  Pre-surgical weight: 152 lbs      Other Bariatric Surgeries  Lap sleeve and HHR 6/4/24    Visit: 6   weeks  Today's Visit:   Wt Readings from Last 1 Encounters:   06/27/25 68.3 kg (150 lb 9.6 oz)    Body mass index is 27.55 kg/m².   Last Visit:    Wt Readings from Last 3 Encounters:   06/27/25 68.3 kg (150 lb 9.6 oz)   06/24/25 65.8 kg (145 lb)   05/30/25 65.9 kg (145 lb 4.8 oz)       Total weight loss: 2 lbs since surgery - 111 lbs since starting program    HPI: doing well. Denies dysphagia and reflux, N/V. On bid omeprazole.    DIET INTAKE: soft foods and solids    Diet History:     Fluid intake: 64 oz/day  Eating 3 meals a day. Not snacking. Supplements with protein shakes.       DAILY SUPPLEMENTS:  Calcium: Calcium Citrate w/ vitamin D (1200 - 1500mg)  Multivitamin & Minerals: 1 per day  Iron Supplement: included in multi-vitamin  Vitamin B12: included in multi-vitamin  Vitamin D3: included in multi-vitamin  Other: none  PPI:bid omeprazole 40 mg    EXERCISE: walking    Symptoms:      Nausea/vomiting: denies   Food intolerance: denies   Constipation: denies   Diarrhea: denies   Experiencing dumping: denies   Abdominal pain: denies   Reflux: denies Are you on medications: oemprazole bid      Current Medications[1]    Comorbidities:  No problem-specific Assessment & Plan notes found for this encounter.        REVIEW OF SYSTEMS:  CONSTITUTIONAL: Patient denies fevers, chills, sweats and weight changes.  EYES: Patient denies any visual symptoms.  EARS, NOSE, AND THROAT: No difficulties with hearing. No symptoms of rhinitis or sore throat.  CARDIOVASCULAR: Patient denies chest pains, palpitations, orthopnea and paroxysmal nocturnal  "dyspnea.  RESPIRATORY: No dyspnea on exertion, no wheezing or cough.  GI: No nausea, vomiting, diarrhea, constipation, abdominal pain, hematochezia or melena.  : No urinary hesitancy or dribbling. No nocturia or urinary frequency. No abnormal urethral discharge.  MUSCULOSKELETAL: No myalgias or arthralgias.  NEUROLOGIC: No chronic headaches, no seizures. Patient denies numbness, tingling or weakness.  PSYCHIATRIC: Patient denies problems with mood disturbance. No problems with anxiety.  ENDOCRINE: No excessive urination or excessive thirst.  DERMATOLOGIC: Patient denies any rashes or skin changes.    PHYSICAL EXAM:  /64 (BP Location: Right arm, Patient Position: Sitting, BP Cuff Size: Adult)   Pulse 66   Ht 1.575 m (5' 2\")   Wt 68.3 kg (150 lb 9.6 oz)   LMP 07/29/2022   SpO2 99%   BMI 27.55 kg/m²   GENERAL: Obese. No apparent distress. Pt is alert and oriented x3.  HEENT: Head is normocephalic and atraumatic. Extraocular muscles are intact. Pupils are equal, round, and reactive to light and accommodation. Nares appeared normal. Mouth is well hydrated and without lesions. Mucous membranes are moist. Posterior pharynx clear of any exudate or lesions.  NECK: Supple. No carotid bruits. No lymphadenopathy or thyromegaly.  LUNGS: Clear to auscultation.  HEART: Regular rate and rhythm without murmur.  ABDOMEN: Soft, nontender, and nondistended. Positive bowel sounds. No hepatosplenomegaly was noted. Incisions well healed.  EXTREMITIES: Without any cyanosis, clubbing, rash, lesions or edema.  NEUROLOGIC: Cranial nerves II through XII are grossly intact.  PSYCHIATRIC: Flat affect, but denies suicidal or homicidal ideations.  SKIN: No ulceration or induration present.      A/P: Normal post-OP course    No sign of infection, Doing well, Excellent Pain control, and tolerating diet    Energy: Energy good    Weight loss: Steady    Recommendations: counseled on exercise, Fluid intake 60 oz/day, Protein 60 g/day, " counseled on diet: advance to soft food diet, Continue taking vitamins as directed., and 3 month labs No further lifting restrictions. Start adding in more cardio and strength training.     Follow up: 6 weeks    Decrease omeprazole to once a day.     Nacho Salmon MD  Bariatric and Minimally Invasive General Surgery                     [1]   Current Outpatient Medications   Medication Sig Dispense Refill    acetaminophen (Tylenol) 325 mg tablet Take 2 tablets (650 mg) by mouth every 6 hours. 10/16/24  Pt verbalizes taking PRN [facility] LPN      ALPRAZolam (Xanax) 0.5 mg tablet take 1 to 2 tablets by mouth once daily if needed for anxiety      cholecalciferol (Vitamin D3) 50 MCG (2000 UT) tablet Take 1 tablet (2,000 Units) by mouth once daily.      FLUoxetine (PROzac) 20 mg capsule Take 1 capsule (20 mg) by mouth once daily.      multivitamin-min-iron-FA-vit K 45 mg iron- 800 mcg-120 mcg capsule Take 1 tablet by mouth 2 times a day.      ondansetron ODT (Zofran-ODT) 4 mg disintegrating tablet Dissolve 1 tablet (4 mg) in the mouth every 6 hours if needed for nausea or vomiting. 15 tablet 1    UNABLE TO FIND Take 1 tablet by mouth once daily. Med Name: Baritastic Calcium Supplement      omeprazole (PriLOSEC) 40 mg DR capsule Take 1 capsule (40 mg) by mouth 2 times a day. Do not crush or chew. 30 capsule 1    promethazine (Phenergan) 12.5 mg tablet Take 1 tablet (12.5 mg) by mouth every 6 hours if needed for nausea or vomiting. (Patient not taking: Reported on 6/27/2025) 30 tablet 1     No current facility-administered medications for this visit.

## 2025-07-29 ENCOUNTER — APPOINTMENT (OUTPATIENT)
Dept: OBSTETRICS AND GYNECOLOGY | Facility: CLINIC | Age: 30
End: 2025-07-29
Payer: COMMERCIAL

## 2025-07-29 VITALS
BODY MASS INDEX: 27.97 KG/M2 | WEIGHT: 152 LBS | SYSTOLIC BLOOD PRESSURE: 110 MMHG | DIASTOLIC BLOOD PRESSURE: 74 MMHG | HEIGHT: 62 IN

## 2025-07-29 DIAGNOSIS — N89.8 PRURITUS OF VAGINA: ICD-10-CM

## 2025-07-29 DIAGNOSIS — Z01.419 WOMEN'S ANNUAL ROUTINE GYNECOLOGICAL EXAMINATION: Primary | ICD-10-CM

## 2025-07-29 PROCEDURE — 99395 PREV VISIT EST AGE 18-39: CPT | Performed by: NURSE PRACTITIONER

## 2025-07-29 PROCEDURE — 1036F TOBACCO NON-USER: CPT | Performed by: NURSE PRACTITIONER

## 2025-07-29 PROCEDURE — 3008F BODY MASS INDEX DOCD: CPT | Performed by: NURSE PRACTITIONER

## 2025-07-29 RX ORDER — FLUCONAZOLE 150 MG/1
150 TABLET ORAL
Qty: 2 TABLET | Refills: 0 | Status: SHIPPED | OUTPATIENT
Start: 2025-07-29 | End: 2025-08-02

## 2025-07-29 ASSESSMENT — ENCOUNTER SYMPTOMS
ANOREXIA: 1
RESPIRATORY NEGATIVE: 1
NEUROLOGICAL NEGATIVE: 1
CARDIOVASCULAR NEGATIVE: 1
CONSTITUTIONAL NEGATIVE: 1
EYES NEGATIVE: 1
FREQUENCY: 1
PSYCHIATRIC NEGATIVE: 1
ENDOCRINE NEGATIVE: 1
MUSCULOSKELETAL NEGATIVE: 1

## 2025-07-29 NOTE — PROGRESS NOTES
Subjective   Patient ID: Erick Becerril is a 30 y.o. female who presents for Annual Exam (Patient states a few days ago she started to get onset symptoms of a yeast infection. /HX of Hysterectomy /Normal PAP 8/23/2021).  30 year old here for annual exam with complaints of having vaginal discharge, itching and burning.  She notes her symptoms began about 3 days ago.  Symptoms have not worsened but have not subsided.  She denies any urinary changes, bleeding and pain.  She denies any vaginal odor, but notes her discharge is thicker than normal.  She is not due for a pap due to a history of hysterectomy.     Female  Problem  The patient's primary symptoms include vaginal discharge. This is a new problem. The current episode started in the past 7 days. The problem occurs rarely. The problem has been gradually worsening. The patient is experiencing no pain. The problem affects both sides. She is not pregnant. Associated symptoms include anorexia and painful intercourse. The vaginal discharge was copious and thick. She has not been passing clots. She has not been passing tissue. Nothing aggravates the symptoms. She is sexually active. No, her partner does not have an STD. She uses hysterectomy for contraception. She is postmenopausal.       Review of Systems   Constitutional: Negative.    HENT: Negative.     Eyes: Negative.    Respiratory: Negative.     Cardiovascular: Negative.    Gastrointestinal:  Positive for anorexia.   Endocrine: Negative.    Genitourinary:  Positive for vaginal discharge.        Itching   Musculoskeletal: Negative.    Skin: Negative.    Neurological: Negative.    Psychiatric/Behavioral: Negative.         Objective   Physical Exam  Vitals reviewed.   Constitutional:       Appearance: Normal appearance. She is well-developed.   Pulmonary:      Effort: Pulmonary effort is normal. No respiratory distress.   Chest:   Breasts:     Breasts are symmetrical.      Right: Normal. No swelling, bleeding,  inverted nipple, mass, nipple discharge, skin change or tenderness.      Left: Normal. No swelling, bleeding, inverted nipple, mass, nipple discharge, skin change or tenderness.   Abdominal:      Palpations: Abdomen is soft.   Genitourinary:     General: Normal vulva.      Exam position: Lithotomy position.      Pubic Area: No rash.       Labia:         Right: No rash, tenderness, lesion or injury.         Left: No rash, tenderness, lesion or injury.       Urethra: No prolapse, urethral pain, urethral swelling or urethral lesion.      Vagina: Normal.      Uterus: Absent.       Adnexa: Right adnexa normal and left adnexa normal.      Rectum: Normal.     Musculoskeletal:         General: Normal range of motion.   Lymphadenopathy:      Upper Body:      Right upper body: No supraclavicular, axillary or pectoral adenopathy.      Left upper body: No supraclavicular, axillary or pectoral adenopathy.     Skin:     General: Skin is warm and dry.     Neurological:      General: No focal deficit present.      Mental Status: She is alert and oriented to person, place, and time. Mental status is at baseline.     Psychiatric:         Attention and Perception: Attention and perception normal.         Mood and Affect: Mood and affect normal.         Speech: Speech normal.         Behavior: Behavior normal. Behavior is cooperative.         Thought Content: Thought content normal.         Judgment: Judgment normal.         Assessment/Plan   Problem List Items Addressed This Visit    None  Visit Diagnoses         Codes      Women's annual routine gynecological examination    -  Primary Z01.419      Pruritus of vagina     N89.8    Relevant Medications    fluconazole (Diflucan) 150 mg tablet        Pap not indicated  Vaginal gram stain sent   Fluconazole ordered, will alter treatment if needed pending results  Follow up 1 year for annual exam, sooner as needed if problems arise         JEANINE Bernabe-CNP 07/29/25 10:24 AM

## 2025-07-30 LAB — BV SCORE VAG QL: NORMAL

## 2025-08-02 LAB
25(OH)D3+25(OH)D2 SERPL-MCNC: 43 NG/ML (ref 30–100)
ALBUMIN SERPL-MCNC: 3.9 G/DL (ref 3.6–5.1)
ALP SERPL-CCNC: 58 U/L (ref 31–125)
ALT SERPL-CCNC: 10 U/L (ref 6–29)
ANION GAP SERPL CALCULATED.4IONS-SCNC: 6 MMOL/L (CALC) (ref 7–17)
AST SERPL-CCNC: 11 U/L (ref 10–30)
BASOPHILS # BLD AUTO: 50 CELLS/UL (ref 0–200)
BASOPHILS NFR BLD AUTO: 0.7 %
BILIRUB SERPL-MCNC: 0.4 MG/DL (ref 0.2–1.2)
BUN SERPL-MCNC: 12 MG/DL (ref 7–25)
CALCIUM SERPL-MCNC: 8.6 MG/DL (ref 8.6–10.2)
CHLORIDE SERPL-SCNC: 107 MMOL/L (ref 98–110)
CO2 SERPL-SCNC: 26 MMOL/L (ref 20–32)
CREAT SERPL-MCNC: 0.59 MG/DL (ref 0.5–0.97)
EGFRCR SERPLBLD CKD-EPI 2021: 123 ML/MIN/1.73M2
EOSINOPHIL # BLD AUTO: 122 CELLS/UL (ref 15–500)
EOSINOPHIL NFR BLD AUTO: 1.7 %
ERYTHROCYTE [DISTWIDTH] IN BLOOD BY AUTOMATED COUNT: 13.4 % (ref 11–15)
EST. AVERAGE GLUCOSE BLD GHB EST-MCNC: 103 MG/DL
EST. AVERAGE GLUCOSE BLD GHB EST-SCNC: 5.7 MMOL/L
FERRITIN SERPL-MCNC: 22 NG/ML (ref 16–154)
FOLATE SERPL-MCNC: 5.6 NG/ML
GLUCOSE SERPL-MCNC: 86 MG/DL (ref 65–99)
HBA1C MFR BLD: 5.2 %
HCT VFR BLD AUTO: 36.1 % (ref 35–45)
HGB BLD-MCNC: 11.2 G/DL (ref 11.7–15.5)
IRON SATN MFR SERPL: 14 % (CALC) (ref 16–45)
IRON SERPL-MCNC: 48 MCG/DL (ref 40–190)
LYMPHOCYTES # BLD AUTO: 1382 CELLS/UL (ref 850–3900)
LYMPHOCYTES NFR BLD AUTO: 19.2 %
MCH RBC QN AUTO: 29.2 PG (ref 27–33)
MCHC RBC AUTO-ENTMCNC: 31 G/DL (ref 32–36)
MCV RBC AUTO: 94 FL (ref 80–100)
MONOCYTES # BLD AUTO: 432 CELLS/UL (ref 200–950)
MONOCYTES NFR BLD AUTO: 6 %
NEUTROPHILS # BLD AUTO: 5213 CELLS/UL (ref 1500–7800)
NEUTROPHILS NFR BLD AUTO: 72.4 %
PLATELET # BLD AUTO: 290 THOUSAND/UL (ref 140–400)
PMV BLD REES-ECKER: 10 FL (ref 7.5–12.5)
POTASSIUM SERPL-SCNC: 4 MMOL/L (ref 3.5–5.3)
PROT SERPL-MCNC: 6.4 G/DL (ref 6.1–8.1)
PTH-INTACT SERPL-MCNC: 72 PG/ML (ref 16–77)
RBC # BLD AUTO: 3.84 MILLION/UL (ref 3.8–5.1)
SODIUM SERPL-SCNC: 139 MMOL/L (ref 135–146)
TIBC SERPL-MCNC: 335 MCG/DL (CALC) (ref 250–450)
VIT B1 BLD-SCNC: 97 NMOL/L (ref 78–185)
VIT B12 SERPL-MCNC: 144 PG/ML (ref 200–1100)
WBC # BLD AUTO: 7.2 THOUSAND/UL (ref 3.8–10.8)

## 2025-08-05 ENCOUNTER — APPOINTMENT (OUTPATIENT)
Dept: SURGERY | Facility: CLINIC | Age: 30
End: 2025-08-05
Payer: COMMERCIAL

## 2025-08-05 NOTE — PROGRESS NOTES
"Follow Up Bariatric Nutrition Assessment    Name: Erick Becerril  MRN: [id number]  Date: 08/06/25     Surgery Date: 5/12/25  Surgeon: Viky  Procedure: gastric bypass/HHR    ASSESSMENT:    Current weight in pounds:     Vitals:    08/06/25 1005   Weight: 67.1 kg (148 lb)        Ht:  1.575 m (5' 2\")      BMI:  Body mass index is 27.07 kg/m².  Previous weight in pounds:  145.0  6/24/25    Initial start weight in pounds: 261.0  10/11/23 prior to sleeve  EBW in pounds: 125.0  Total weight change in pounds: 113.0  %EBW Lost: 90.4    PROGRESS:  Nutrition Interventions for last encounter   Continue to drink your protein shakes to meet your goal of 60-70 g of protein per day. Begin measuring how much protein you can eat on the soft diet so that you know when to start weaning off of your protein shakes.   Continue to drink 64 oz. of zero calorie beverages per day  Continue no drinking 30 min before, during the meal and for 30 minutes after the meal  Continue to exercise  Remember to eat slowly and chew thoroughly  Try one new food at a time to test for any intolerances.   Continue to take all of your vitamins and minerals.   Attend support groups as needed    CHANGES IN TREATMENT:   Patient met goals: yes   24 hour food recall: 64 oz. fluid and 72 grams protein  Breakfast: Premier protein shake  Snack: no  Lunch: Premier protein shake (has sore throat; normally has boiled egg and Greek yogurt)  Snack: no  Dinner: salad with 2 oz. chicken with sf dressing  Snack: no  Beverages: water, coffee with unsweetened almond milk and stevia,   Alcohol: no    Vitamins: Bariatric Fusion with iron MVI (4x/day), 1200 mg calcium (in MVI), and  B12(in MVI but will add 500 mcg. 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    READINESS TO LEARN:  Motivation to learn:  Good       Understanding of instruction: " Good  Anticipated Compliance: Good  Family Support: Unable to assess-family not present     The pt is 3 months postop gastric bypass/HHR.   Patient has lost 113.0 pounds since initial assessment accounting for 90.4% loss excess body weight.  Tolerating regular diet without difficulty.  Protein intake is adequate for post-op individual.  Fluid consumption is adequate.  Patient is supplementing recommended vitamin/minerals and will increase B12 with additional 500 mcg due to labs.   Pt has been ffeeling well with the exception of current cold and sore throat,  and is not having any difficulties.    Malnutrition Screening  Significant unintentional weight loss? n/a  Eating less than 75% of usual intake for more than 2 weeks? n/a    Nutrition Diagnosis:   Increased protein and nutrition needs related to altered GI function as evidenced by pt. s/p gastric bypass.  Food- and nutrition-related knowledge deficit related to lack of prior exposure to surgical weight loss information as evidenced by diet recall.     Nutrition Interventions:   Modify type and amount of food and nutrients within meals and snacks.  Comprehensive Nutrition Education  -Nutrition education materials: none      Recommendations:    Continue to eat 60-70 g of protein per day.  Continue to drink 64 oz. of zero calorie beverages per day.  Continue no drinking 30 min before, during the meal and for 30 minutes after the meal.  Eat slowly, chew thoroughly.  Continue regular exercise and increase intensity and duration.  Continue current vit/min regimen with additional 500 mcg B12.  Attend support Groups as needed.    Nutrition Monitoring and Evaluation:   1-2 pounds weight loss per week  Criteria: weight check, food recall  Need for Follow-up: 6 months post-op, sooner as needed.         [1]   Current Outpatient Medications:     acetaminophen (Tylenol) 325 mg tablet, Take 2 tablets (650 mg) by mouth every 6 hours. 10/16/24  Pt verbalizes taking PRN [facility] LPN,  Disp: , Rfl:     ALPRAZolam (Xanax) 0.5 mg tablet, take 1 to 2 tablets by mouth once daily if needed for anxiety, Disp: , Rfl:     cholecalciferol (Vitamin D3) 50 MCG (2000 UT) tablet, Take 1 tablet (2,000 Units) by mouth once daily., Disp: , Rfl:     FLUoxetine (PROzac) 20 mg capsule, Take 1 capsule (20 mg) by mouth once daily., Disp: , Rfl:     multivitamin-min-iron-FA-vit K 45 mg iron- 800 mcg-120 mcg capsule, Take 1 tablet by mouth 2 times a day., Disp: , Rfl:     omeprazole (PriLOSEC) 40 mg DR capsule, Take 1 capsule (40 mg) by mouth 2 times a day. Do not crush or chew., Disp: 30 capsule, Rfl: 1    ondansetron ODT (Zofran-ODT) 4 mg disintegrating tablet, Dissolve 1 tablet (4 mg) in the mouth every 6 hours if needed for nausea or vomiting., Disp: 15 tablet, Rfl: 1    promethazine (Phenergan) 12.5 mg tablet, Take 1 tablet (12.5 mg) by mouth every 6 hours if needed for nausea or vomiting. (Patient not taking: Reported on 6/27/2025), Disp: 30 tablet, Rfl: 1    UNABLE TO FIND, Take 1 tablet by mouth once daily. Med Name: Baritastic Calcium Supplement, Disp: , Rfl:

## 2025-08-06 ENCOUNTER — NUTRITION (OUTPATIENT)
Dept: SURGERY | Facility: CLINIC | Age: 30
End: 2025-08-06
Payer: COMMERCIAL

## 2025-08-06 VITALS — BODY MASS INDEX: 27.23 KG/M2 | WEIGHT: 148 LBS | HEIGHT: 62 IN

## 2025-08-06 DIAGNOSIS — Z98.84 S/P GASTRIC BYPASS: Primary | ICD-10-CM

## 2025-08-08 ENCOUNTER — APPOINTMENT (OUTPATIENT)
Dept: SURGERY | Facility: CLINIC | Age: 30
End: 2025-08-08
Payer: COMMERCIAL

## 2025-08-21 ENCOUNTER — APPOINTMENT (OUTPATIENT)
Dept: SURGERY | Facility: CLINIC | Age: 30
End: 2025-08-21
Payer: COMMERCIAL

## 2025-11-04 ENCOUNTER — APPOINTMENT (OUTPATIENT)
Dept: SURGERY | Facility: CLINIC | Age: 30
End: 2025-11-04
Payer: COMMERCIAL

## 2026-08-04 ENCOUNTER — APPOINTMENT (OUTPATIENT)
Dept: OBSTETRICS AND GYNECOLOGY | Facility: CLINIC | Age: 31
End: 2026-08-04
Payer: COMMERCIAL

## (undated) DEVICE — BAG, DECANTER

## (undated) DEVICE — LUBRICANT, ELECTROLUBE, F/ELECTRODE TIPS

## (undated) DEVICE — RELOAD, SUREFORM STAPLER 60, 3.5 BLUE, DAVINCI XI

## (undated) DEVICE — BLADE, OSCILLATING/SAGITTAL, 43MM X 10MM

## (undated) DEVICE — POSITIONING, THE PINK PAD, PIGAZZI SYSTEM

## (undated) DEVICE — SEAL, UNIVERSAL, 5-12MM

## (undated) DEVICE — DRIVER, NEEDLE, MEGA SUTURE CUT, DAVINCI XI

## (undated) DEVICE — STRAP, ANKLE, DISTRACTOR, ARTHROSCOPY, STERILE

## (undated) DEVICE — STAPLER, LINEAR ENDO RELOAD, 60MM, BLUE, DISP

## (undated) DEVICE — SUTURE, MONOCRYL, 4-0, 18 IN, PS2, UNDYED

## (undated) DEVICE — STAPLER, SUREFORM 60, DAVINCI XI

## (undated) DEVICE — KIT, DISPOSABLES, FOR DX FIBERTAK

## (undated) DEVICE — ASSEMBLY, STRYKER FLOW 2, SUCTION IRRIGATOR, WITH TIP

## (undated) DEVICE — RELOAD, ENDOSTITCH, SURGIDAC, 0, 48 IN, GREEN, SULU

## (undated) DEVICE — DRAPE, SHEET, THREE QUARTER, FAN FOLD, 57 X 77 IN

## (undated) DEVICE — COVER, MAYO STAND, W/PAD, 23 IN, DISPOSABLE, PLASTIC, LF, STERILE

## (undated) DEVICE — BLADE, DISSECTOR, 3.0M X 7CM

## (undated) DEVICE — DRAPE, SHEET, U, W/ADHESIVE STRIP, IMPERVIOUS, 60 X 70 IN, DISPOSABLE, LF, STERILE

## (undated) DEVICE — BANDAGE, ELASTIC, PREMIUM, SELF-CLOSE, 6 IN X 5.5 YD, STERILE

## (undated) DEVICE — SYRINGE, 20 CC, LUER SLIP

## (undated) DEVICE — GLOVE, SURGICAL, PROTEXIS PI BLUE W/NEUTHERA, 7.5, PF, LF

## (undated) DEVICE — RELOAD, SUREFORM STAPLER 60, 4.3 GREEN, DAVINCI XI

## (undated) DEVICE — SYRINGE, 50 CC, LUER LOCK

## (undated) DEVICE — SYSTEM, FIOS FIRST ENTRY, 5 X 100MM, KII ADVANCED FIXATION

## (undated) DEVICE — Device

## (undated) DEVICE — ADHESIVE, DERMABOND, PRINEO, 12 X 9, STERILE

## (undated) DEVICE — NEEDLE, INSUFFLATION, 13GAX120MM, DISP

## (undated) DEVICE — CLIP, ENDO APPLIER LIGAMAX 5MM

## (undated) DEVICE — TUBE SET, PNEUMOCLEAR, SMOKE EVACU, HIGH-FLOW

## (undated) DEVICE — TUBING, PUMP REDEUCE 8FT STERILE

## (undated) DEVICE — SUTURE, CTD, VICRYL, 2-0, UND, BR, CT-2

## (undated) DEVICE — NEEDLE, SPINAL, QUINCKE, 18 G X 3.5 IN, PINK HUB

## (undated) DEVICE — SUTURE, STRATAFIX, SPIRAL PDS PLUS, 2-0, 23CM, CT-2 VIOLET

## (undated) DEVICE — CLEAN KIT, ANTIFOG SCOPE, SEE SHARP 150MM

## (undated) DEVICE — MAT, FLOOR, SURGISAFE, FLUID CONTROL, 46X40

## (undated) DEVICE — FORCEPS, BIPOLAR FENESTRATED XI

## (undated) DEVICE — SUTURE, MONOCRYL, 3-0, 27 IN, SH, UNDYED

## (undated) DEVICE — ADHESIVE, SKIN, DERMABOND ADVANCED, 15CM, PEN-STYLE

## (undated) DEVICE — OBTURATOR, BLADELESS , SU

## (undated) DEVICE — DRAPE, INSTRUMENT, W/POUCH, STERI DRAPE, 9 5/8 X 18 LONG

## (undated) DEVICE — TROCAR, BLADELESS, THREADED, 15MM

## (undated) DEVICE — DRESSING, GAUZE, PETROLATUM, PATCH, XEROFORM, 1 X 8 IN, STERILE

## (undated) DEVICE — APPLICATOR, CHLORAPREP, W/ORANGE TINT, 26ML

## (undated) DEVICE — PADDING, WEBRIL, UNDERCAST, STERILE, 6 IN

## (undated) DEVICE — TROCAR, KII OPTICAL BLADELESS 5MM Z THREAD 100MM LNGTH

## (undated) DEVICE — FORCEPS, CADIERE, DAVINCI XI

## (undated) DEVICE — SUTURE, ETHIBOND, 30 IN, 2-0, SH, GREEN

## (undated) DEVICE — NEEDLE, HYPODERMIC, MONOJECT, 18 G X 1.5 IN

## (undated) DEVICE — SCISSOR, MINI ENDO CUT, TIPS, DISP

## (undated) DEVICE — DRAPE, ARM XI

## (undated) DEVICE — CAUTERY, PENCIL, PUSH BUTTON, SMOKE EVAC, 70MM

## (undated) DEVICE — COVER, TIP HOT SHEARS ENDOWRIST

## (undated) DEVICE — BANDAGE, ESMARK, 6 IN X 9 FT, STERILE

## (undated) DEVICE — TUBING, PATIENT 8FT STERILE

## (undated) DEVICE — CABLE, MONOPOLAR, REUSABLE, DAVINCI XI

## (undated) DEVICE — SUTURE, VICRYL, 0, SH 27 TAPER NEEDLE, UNDYED, BRAIDED

## (undated) DEVICE — ADAPTER, Y TUBING STERILE

## (undated) DEVICE — DEVICE, SUTURE, ENDOSTITCH, 10 MM

## (undated) DEVICE — RELOAD, SUREFORM STAPLER 60, 2.5 WHITE, DAVINCI XI

## (undated) DEVICE — CATHETER, GASTRIC LAVAGE, TUM-E-VAC, SINGLE LUMEN, 32 FR, LF

## (undated) DEVICE — ACCESS SYS, KII OPTICAL, Z-THREAD, 11X100CM

## (undated) DEVICE — ADHESIVE, SKIN, LIQUIBAND EXCEED

## (undated) DEVICE — SHIELD, PRE-KLENZ, SOAK, MED 6ML

## (undated) DEVICE — WAND, ASPIRATING ABLATOR, 50 DEG, APOLLORF SJ50

## (undated) DEVICE — SYRINGE, 20 CC, LUER LOCK, MONOJECT, W/O CAP, LF

## (undated) DEVICE — STAPLER, ECHELON FLEX GST, POWERED PLUS, 60MM X 34CM, STANDARD

## (undated) DEVICE — TOWEL, SURGICAL, NEURO, O/R, 16 X 26, BLUE, STERILE

## (undated) DEVICE — GLOVE, SURGICAL, PROTEXIS PI W/NEU-THERA, 8.0, PF, LF

## (undated) DEVICE — DRAPE, COLUMN, DAVINCI XI

## (undated) DEVICE — RELOAD, ENDOSTITCH, SURGIDAC, 2-0, 48 IN, GREEN, SULU

## (undated) DEVICE — STAPLER,  LINEAR ENDO 60MM RELOAD, GREEN, DISP

## (undated) DEVICE — DRAPE, SHEET, ARTHROSCOPY, W/POUCH, 92 X 102 IN, DISPOSABLE, LF, STERILE

## (undated) DEVICE — COVER, PLASTIC, MAYO STAND, 29.5IN X 55.5IN

## (undated) DEVICE — SOLUTION, INJECTION, USP, SODIUM CHLORIDE 0.9%, .9, NACL, 1000 ML, BAG

## (undated) DEVICE — SCISSORS, MONOPOLAR, CURVED, 8MM

## (undated) DEVICE — ACCESS SYS, KII SHIELDED BLADED, Z-THREAD, 5X100CM

## (undated) DEVICE — CARE KIT, LAPAROSCOPIC, ADVANCED

## (undated) DEVICE — SUTURE, V-LOC PBT 2-0, 6 IN , V20, BLUE, NONABS

## (undated) DEVICE — SEAMGUARD, SUREFORM 60, GREEN//BLUE, FOR ROBOTIC ENDO

## (undated) DEVICE — SOLUTION, IRRIGATION, SODIUM CHLORIDE 0.9%, 1000 ML, POUR BOTTLE

## (undated) DEVICE — GRASPER, FENESTRATED TIP-UP XI

## (undated) DEVICE — KIT, BIO-TENODESIS DISPOSABLE

## (undated) DEVICE — GLOVE, SURGICAL, PROTEXIS PI MICRO, 8.0, PF, LF

## (undated) DEVICE — STAPLELINE, BIOABSORB, SEAMGUARD, 60

## (undated) DEVICE — SUTURE, MONOCRYL, 3-0, 18 IN, PS2, UNDYED

## (undated) DEVICE — SYSTEM, GASTRECTOMY SLEEVE, 36FR W/O BULB, VISIGI 3D

## (undated) DEVICE — GLOVE, SURGICAL, PROTEXIS PI MICRO, 7.5, PF, LF

## (undated) DEVICE — SEALER, VESSEL, EXTENDED

## (undated) DEVICE — NEEDLE, SPINAL, 20 G X 3.5 IN, YELLOW HUB

## (undated) DEVICE — TRAY, MINOR, SINGLE BASIN, STERILE